# Patient Record
Sex: FEMALE | Race: WHITE | Employment: UNEMPLOYED | URBAN - METROPOLITAN AREA
[De-identification: names, ages, dates, MRNs, and addresses within clinical notes are randomized per-mention and may not be internally consistent; named-entity substitution may affect disease eponyms.]

---

## 2018-11-29 ENCOUNTER — TRANSFERRED RECORDS (OUTPATIENT)
Dept: HEALTH INFORMATION MANAGEMENT | Facility: CLINIC | Age: 58
End: 2018-11-29

## 2019-01-14 RX ORDER — ALBUTEROL SULFATE 90 UG/1
2 AEROSOL, METERED RESPIRATORY (INHALATION) EVERY 4 HOURS PRN
COMMUNITY

## 2019-01-14 RX ORDER — GABAPENTIN 300 MG/1
600 CAPSULE ORAL EVERY EVENING
COMMUNITY

## 2019-01-14 RX ORDER — LEVETIRACETAM 1000 MG/1
1000 TABLET ORAL EVERY EVENING
COMMUNITY

## 2019-01-14 RX ORDER — BUSPIRONE HYDROCHLORIDE 15 MG/1
15 TABLET ORAL 3 TIMES DAILY
Status: ON HOLD | COMMUNITY
End: 2019-09-12

## 2019-01-14 RX ORDER — HYDROCHLOROTHIAZIDE 25 MG/1
25 TABLET ORAL DAILY
Status: ON HOLD | COMMUNITY
End: 2019-09-15

## 2019-01-14 RX ORDER — LISINOPRIL 10 MG/1
10 TABLET ORAL DAILY
Status: ON HOLD | COMMUNITY
End: 2019-09-15

## 2019-01-14 SDOH — HEALTH STABILITY: MENTAL HEALTH: HOW OFTEN DO YOU HAVE A DRINK CONTAINING ALCOHOL?: NEVER

## 2019-01-14 ASSESSMENT — MIFFLIN-ST. JEOR: SCORE: 1537.29

## 2019-01-15 ASSESSMENT — MIFFLIN-ST. JEOR: SCORE: 1491.03

## 2019-01-16 ENCOUNTER — ANESTHESIA EVENT (OUTPATIENT)
Dept: SURGERY | Facility: CLINIC | Age: 59
DRG: 454 | End: 2019-01-16
Payer: COMMERCIAL

## 2019-01-17 ENCOUNTER — APPOINTMENT (OUTPATIENT)
Dept: GENERAL RADIOLOGY | Facility: CLINIC | Age: 59
DRG: 454 | End: 2019-01-17
Attending: ORTHOPAEDIC SURGERY
Payer: COMMERCIAL

## 2019-01-17 ENCOUNTER — HOSPITAL ENCOUNTER (INPATIENT)
Facility: CLINIC | Age: 59
LOS: 1 days | Discharge: HOME | DRG: 454 | End: 2019-01-18
Attending: ORTHOPAEDIC SURGERY | Admitting: ORTHOPAEDIC SURGERY
Payer: COMMERCIAL

## 2019-01-17 ENCOUNTER — ANESTHESIA (OUTPATIENT)
Dept: SURGERY | Facility: CLINIC | Age: 59
DRG: 454 | End: 2019-01-17
Payer: COMMERCIAL

## 2019-01-17 DIAGNOSIS — G89.18 POST-OP PAIN: Primary | ICD-10-CM

## 2019-01-17 PROBLEM — M48.061 LUMBAR STENOSIS: Status: ACTIVE | Noted: 2019-01-17

## 2019-01-17 LAB
ABO + RH BLD: NORMAL
ABO + RH BLD: NORMAL
BLD GP AB SCN SERPL QL: NORMAL
BLOOD BANK CMNT PATIENT-IMP: NORMAL
HGB BLD-MCNC: 13.6 G/DL (ref 11.7–15.7)
SPECIMEN EXP DATE BLD: NORMAL

## 2019-01-17 PROCEDURE — C1713 ANCHOR/SCREW BN/BN,TIS/BN: HCPCS | Performed by: ORTHOPAEDIC SURGERY

## 2019-01-17 PROCEDURE — 25000125 ZZHC RX 250: Performed by: ORTHOPAEDIC SURGERY

## 2019-01-17 PROCEDURE — 25000128 H RX IP 250 OP 636

## 2019-01-17 PROCEDURE — 25000128 H RX IP 250 OP 636: Performed by: ANESTHESIOLOGY

## 2019-01-17 PROCEDURE — 27211022 ZZHC OR IOM SUPPLIES OPNP: Performed by: ORTHOPAEDIC SURGERY

## 2019-01-17 PROCEDURE — 0SG00A0 FUSION OF LUMBAR VERTEBRAL JOINT WITH INTERBODY FUSION DEVICE, ANTERIOR APPROACH, ANTERIOR COLUMN, OPEN APPROACH: ICD-10-PCS | Performed by: ORTHOPAEDIC SURGERY

## 2019-01-17 PROCEDURE — 27210995 ZZH RX 272: Performed by: ORTHOPAEDIC SURGERY

## 2019-01-17 PROCEDURE — 25000132 ZZH RX MED GY IP 250 OP 250 PS 637: Performed by: ORTHOPAEDIC SURGERY

## 2019-01-17 PROCEDURE — 36000069 ZZH SURGERY LEVEL 5 EA 15 ADDTL MIN: Performed by: ORTHOPAEDIC SURGERY

## 2019-01-17 PROCEDURE — 36000071 ZZH SURGERY LEVEL 5 W FLUORO 1ST 30 MIN: Performed by: ORTHOPAEDIC SURGERY

## 2019-01-17 PROCEDURE — 0SW004Z REVISION OF INTERNAL FIXATION DEVICE IN LUMBAR VERTEBRAL JOINT, OPEN APPROACH: ICD-10-PCS | Performed by: ORTHOPAEDIC SURGERY

## 2019-01-17 PROCEDURE — 36415 COLL VENOUS BLD VENIPUNCTURE: CPT | Performed by: ORTHOPAEDIC SURGERY

## 2019-01-17 PROCEDURE — 25000128 H RX IP 250 OP 636: Performed by: ORTHOPAEDIC SURGERY

## 2019-01-17 PROCEDURE — 0SB20ZZ EXCISION OF LUMBAR VERTEBRAL DISC, OPEN APPROACH: ICD-10-PCS | Performed by: ORTHOPAEDIC SURGERY

## 2019-01-17 PROCEDURE — 86901 BLOOD TYPING SEROLOGIC RH(D): CPT | Performed by: ORTHOPAEDIC SURGERY

## 2019-01-17 PROCEDURE — 85018 HEMOGLOBIN: CPT | Performed by: ORTHOPAEDIC SURGERY

## 2019-01-17 PROCEDURE — 95940 IONM IN OPERATNG ROOM 15 MIN: CPT | Performed by: ORTHOPAEDIC SURGERY

## 2019-01-17 PROCEDURE — 37000009 ZZH ANESTHESIA TECHNICAL FEE, EACH ADDTL 15 MIN: Performed by: ORTHOPAEDIC SURGERY

## 2019-01-17 PROCEDURE — 71000012 ZZH RECOVERY PHASE 1 LEVEL 1 FIRST HR: Performed by: ORTHOPAEDIC SURGERY

## 2019-01-17 PROCEDURE — 12000000 ZZH R&B MED SURG/OB

## 2019-01-17 PROCEDURE — 86900 BLOOD TYPING SEROLOGIC ABO: CPT | Performed by: ORTHOPAEDIC SURGERY

## 2019-01-17 PROCEDURE — 40000306 ZZH STATISTIC PRE PROC ASSESS II: Performed by: ORTHOPAEDIC SURGERY

## 2019-01-17 PROCEDURE — 25000125 ZZHC RX 250: Performed by: NURSE ANESTHETIST, CERTIFIED REGISTERED

## 2019-01-17 PROCEDURE — 37000008 ZZH ANESTHESIA TECHNICAL FEE, 1ST 30 MIN: Performed by: ORTHOPAEDIC SURGERY

## 2019-01-17 PROCEDURE — 25000132 ZZH RX MED GY IP 250 OP 250 PS 637: Performed by: INTERNAL MEDICINE

## 2019-01-17 PROCEDURE — 07DS3ZZ EXTRACTION OF VERTEBRAL BONE MARROW, PERCUTANEOUS APPROACH: ICD-10-PCS | Performed by: ORTHOPAEDIC SURGERY

## 2019-01-17 PROCEDURE — 25000125 ZZHC RX 250

## 2019-01-17 PROCEDURE — 86850 RBC ANTIBODY SCREEN: CPT | Performed by: ORTHOPAEDIC SURGERY

## 2019-01-17 PROCEDURE — 25000128 H RX IP 250 OP 636: Performed by: NURSE ANESTHETIST, CERTIFIED REGISTERED

## 2019-01-17 PROCEDURE — 27210794 ZZH OR GENERAL SUPPLY STERILE: Performed by: ORTHOPAEDIC SURGERY

## 2019-01-17 PROCEDURE — 40000277 XR SURGERY CARM FLUORO LESS THAN 5 MIN W STILLS

## 2019-01-17 PROCEDURE — 71000013 ZZH RECOVERY PHASE 1 LEVEL 1 EA ADDTL HR: Performed by: ORTHOPAEDIC SURGERY

## 2019-01-17 PROCEDURE — 0SG0071 FUSION OF LUMBAR VERTEBRAL JOINT WITH AUTOLOGOUS TISSUE SUBSTITUTE, POSTERIOR APPROACH, POSTERIOR COLUMN, OPEN APPROACH: ICD-10-PCS | Performed by: ORTHOPAEDIC SURGERY

## 2019-01-17 PROCEDURE — C1763 CONN TISS, NON-HUMAN: HCPCS | Performed by: ORTHOPAEDIC SURGERY

## 2019-01-17 PROCEDURE — 27211024 ZZHC OR SUPPLY OTHER OPNP: Performed by: ORTHOPAEDIC SURGERY

## 2019-01-17 RX ORDER — SODIUM CHLORIDE, SODIUM LACTATE, POTASSIUM CHLORIDE, CALCIUM CHLORIDE 600; 310; 30; 20 MG/100ML; MG/100ML; MG/100ML; MG/100ML
INJECTION, SOLUTION INTRAVENOUS CONTINUOUS
Status: DISCONTINUED | OUTPATIENT
Start: 2019-01-17 | End: 2019-01-17 | Stop reason: HOSPADM

## 2019-01-17 RX ORDER — LORAZEPAM 2 MG/ML
.5-1 INJECTION INTRAMUSCULAR EVERY 6 HOURS PRN
Status: DISCONTINUED | OUTPATIENT
Start: 2019-01-17 | End: 2019-01-18 | Stop reason: HOSPADM

## 2019-01-17 RX ORDER — KETOROLAC TROMETHAMINE 30 MG/ML
30 INJECTION, SOLUTION INTRAMUSCULAR; INTRAVENOUS EVERY 6 HOURS
Status: DISCONTINUED | OUTPATIENT
Start: 2019-01-17 | End: 2019-01-18 | Stop reason: HOSPADM

## 2019-01-17 RX ORDER — NALOXONE HYDROCHLORIDE 0.4 MG/ML
.1-.4 INJECTION, SOLUTION INTRAMUSCULAR; INTRAVENOUS; SUBCUTANEOUS
Status: DISCONTINUED | OUTPATIENT
Start: 2019-01-17 | End: 2019-01-18 | Stop reason: HOSPADM

## 2019-01-17 RX ORDER — LIDOCAINE 40 MG/G
CREAM TOPICAL
Status: DISCONTINUED | OUTPATIENT
Start: 2019-01-17 | End: 2019-01-18 | Stop reason: HOSPADM

## 2019-01-17 RX ORDER — HYDRALAZINE HYDROCHLORIDE 20 MG/ML
2.5-5 INJECTION INTRAMUSCULAR; INTRAVENOUS EVERY 10 MIN PRN
Status: DISCONTINUED | OUTPATIENT
Start: 2019-01-17 | End: 2019-01-17 | Stop reason: HOSPADM

## 2019-01-17 RX ORDER — LABETALOL HYDROCHLORIDE 5 MG/ML
10 INJECTION, SOLUTION INTRAVENOUS
Status: DISCONTINUED | OUTPATIENT
Start: 2019-01-17 | End: 2019-01-17 | Stop reason: HOSPADM

## 2019-01-17 RX ORDER — LEVETIRACETAM 500 MG/1
1000 TABLET ORAL EVERY EVENING
Status: DISCONTINUED | OUTPATIENT
Start: 2019-01-17 | End: 2019-01-18 | Stop reason: HOSPADM

## 2019-01-17 RX ORDER — AMOXICILLIN 250 MG
1 CAPSULE ORAL 2 TIMES DAILY
Status: DISCONTINUED | OUTPATIENT
Start: 2019-01-17 | End: 2019-01-18 | Stop reason: HOSPADM

## 2019-01-17 RX ORDER — GLYCOPYRROLATE 0.2 MG/ML
INJECTION, SOLUTION INTRAMUSCULAR; INTRAVENOUS PRN
Status: DISCONTINUED | OUTPATIENT
Start: 2019-01-17 | End: 2019-01-17

## 2019-01-17 RX ORDER — LEVETIRACETAM 500 MG/1
500 TABLET ORAL EVERY MORNING
COMMUNITY

## 2019-01-17 RX ORDER — DIMENHYDRINATE 50 MG/ML
25 INJECTION, SOLUTION INTRAMUSCULAR; INTRAVENOUS
Status: DISCONTINUED | OUTPATIENT
Start: 2019-01-17 | End: 2019-01-17 | Stop reason: HOSPADM

## 2019-01-17 RX ORDER — DEXAMETHASONE SODIUM PHOSPHATE 4 MG/ML
INJECTION, SOLUTION INTRA-ARTICULAR; INTRALESIONAL; INTRAMUSCULAR; INTRAVENOUS; SOFT TISSUE PRN
Status: DISCONTINUED | OUTPATIENT
Start: 2019-01-17 | End: 2019-01-17

## 2019-01-17 RX ORDER — LORAZEPAM 0.5 MG/1
.5-1 TABLET ORAL EVERY 6 HOURS PRN
Status: DISCONTINUED | OUTPATIENT
Start: 2019-01-17 | End: 2019-01-18 | Stop reason: HOSPADM

## 2019-01-17 RX ORDER — VANCOMYCIN HCL 900 MCG/MG
POWDER (GRAM) MISCELLANEOUS PRN
Status: DISCONTINUED | OUTPATIENT
Start: 2019-01-17 | End: 2019-01-17 | Stop reason: HOSPADM

## 2019-01-17 RX ORDER — OXYCODONE HYDROCHLORIDE 5 MG/1
5-10 TABLET ORAL
Status: DISCONTINUED | OUTPATIENT
Start: 2019-01-17 | End: 2019-01-18 | Stop reason: HOSPADM

## 2019-01-17 RX ORDER — BUPIVACAINE HYDROCHLORIDE 2.5 MG/ML
INJECTION, SOLUTION EPIDURAL; INFILTRATION; INTRACAUDAL PRN
Status: DISCONTINUED | OUTPATIENT
Start: 2019-01-17 | End: 2019-01-17 | Stop reason: HOSPADM

## 2019-01-17 RX ORDER — CEFAZOLIN SODIUM 2 G/100ML
2 INJECTION, SOLUTION INTRAVENOUS
Status: COMPLETED | OUTPATIENT
Start: 2019-01-17 | End: 2019-01-17

## 2019-01-17 RX ORDER — HYDROMORPHONE HYDROCHLORIDE 1 MG/ML
.3-.5 INJECTION, SOLUTION INTRAMUSCULAR; INTRAVENOUS; SUBCUTANEOUS EVERY 5 MIN PRN
Status: DISCONTINUED | OUTPATIENT
Start: 2019-01-17 | End: 2019-01-17 | Stop reason: HOSPADM

## 2019-01-17 RX ORDER — LIDOCAINE HYDROCHLORIDE 10 MG/ML
INJECTION, SOLUTION INFILTRATION; PERINEURAL PRN
Status: DISCONTINUED | OUTPATIENT
Start: 2019-01-17 | End: 2019-01-17

## 2019-01-17 RX ORDER — CEFAZOLIN SODIUM 1 G/3ML
1 INJECTION, POWDER, FOR SOLUTION INTRAMUSCULAR; INTRAVENOUS SEE ADMIN INSTRUCTIONS
Status: DISCONTINUED | OUTPATIENT
Start: 2019-01-17 | End: 2019-01-17 | Stop reason: HOSPADM

## 2019-01-17 RX ORDER — CEFAZOLIN SODIUM 1 G/3ML
INJECTION, POWDER, FOR SOLUTION INTRAMUSCULAR; INTRAVENOUS PRN
Status: DISCONTINUED | OUTPATIENT
Start: 2019-01-17 | End: 2019-01-17

## 2019-01-17 RX ORDER — FENTANYL CITRATE 50 UG/ML
25-50 INJECTION, SOLUTION INTRAMUSCULAR; INTRAVENOUS
Status: DISCONTINUED | OUTPATIENT
Start: 2019-01-17 | End: 2019-01-17 | Stop reason: HOSPADM

## 2019-01-17 RX ORDER — ONDANSETRON 2 MG/ML
INJECTION INTRAMUSCULAR; INTRAVENOUS PRN
Status: DISCONTINUED | OUTPATIENT
Start: 2019-01-17 | End: 2019-01-17

## 2019-01-17 RX ORDER — LEVETIRACETAM 500 MG/1
500 TABLET ORAL EVERY MORNING
Status: DISCONTINUED | OUTPATIENT
Start: 2019-01-18 | End: 2019-01-18 | Stop reason: HOSPADM

## 2019-01-17 RX ORDER — ONDANSETRON 2 MG/ML
4 INJECTION INTRAMUSCULAR; INTRAVENOUS EVERY 30 MIN PRN
Status: DISCONTINUED | OUTPATIENT
Start: 2019-01-17 | End: 2019-01-17 | Stop reason: HOSPADM

## 2019-01-17 RX ORDER — AMOXICILLIN 250 MG
2 CAPSULE ORAL 2 TIMES DAILY
Status: DISCONTINUED | OUTPATIENT
Start: 2019-01-17 | End: 2019-01-18 | Stop reason: HOSPADM

## 2019-01-17 RX ORDER — CYCLOBENZAPRINE HCL 10 MG
10 TABLET ORAL 3 TIMES DAILY PRN
Status: DISCONTINUED | OUTPATIENT
Start: 2019-01-17 | End: 2019-01-18 | Stop reason: HOSPADM

## 2019-01-17 RX ORDER — PROPOFOL 10 MG/ML
INJECTION, EMULSION INTRAVENOUS CONTINUOUS PRN
Status: DISCONTINUED | OUTPATIENT
Start: 2019-01-17 | End: 2019-01-17

## 2019-01-17 RX ORDER — FENTANYL CITRATE 50 UG/ML
INJECTION, SOLUTION INTRAMUSCULAR; INTRAVENOUS PRN
Status: DISCONTINUED | OUTPATIENT
Start: 2019-01-17 | End: 2019-01-17

## 2019-01-17 RX ORDER — CEFAZOLIN SODIUM 1 G/50ML
1 INJECTION, SOLUTION INTRAVENOUS EVERY 8 HOURS
Status: COMPLETED | OUTPATIENT
Start: 2019-01-17 | End: 2019-01-18

## 2019-01-17 RX ORDER — EPHEDRINE SULFATE 50 MG/ML
INJECTION, SOLUTION INTRAMUSCULAR; INTRAVENOUS; SUBCUTANEOUS PRN
Status: DISCONTINUED | OUTPATIENT
Start: 2019-01-17 | End: 2019-01-17

## 2019-01-17 RX ORDER — ACETAMINOPHEN 325 MG/1
975 TABLET ORAL EVERY 8 HOURS
Status: DISCONTINUED | OUTPATIENT
Start: 2019-01-17 | End: 2019-01-18 | Stop reason: HOSPADM

## 2019-01-17 RX ORDER — ONDANSETRON 4 MG/1
4 TABLET, ORALLY DISINTEGRATING ORAL EVERY 30 MIN PRN
Status: DISCONTINUED | OUTPATIENT
Start: 2019-01-17 | End: 2019-01-17 | Stop reason: HOSPADM

## 2019-01-17 RX ORDER — LIDOCAINE 40 MG/G
CREAM TOPICAL
Status: DISCONTINUED | OUTPATIENT
Start: 2019-01-17 | End: 2019-01-17 | Stop reason: HOSPADM

## 2019-01-17 RX ORDER — METHADONE HYDROCHLORIDE 10 MG/ML
15 INJECTION, SOLUTION INTRAMUSCULAR; INTRAVENOUS; SUBCUTANEOUS ONCE
Status: COMPLETED | OUTPATIENT
Start: 2019-01-17 | End: 2019-01-17

## 2019-01-17 RX ORDER — GABAPENTIN 300 MG/1
300 CAPSULE ORAL 2 TIMES DAILY
Status: DISCONTINUED | OUTPATIENT
Start: 2019-01-17 | End: 2019-01-17

## 2019-01-17 RX ORDER — PROPOFOL 10 MG/ML
INJECTION, EMULSION INTRAVENOUS PRN
Status: DISCONTINUED | OUTPATIENT
Start: 2019-01-17 | End: 2019-01-17

## 2019-01-17 RX ORDER — SODIUM CHLORIDE AND POTASSIUM CHLORIDE 150; 900 MG/100ML; MG/100ML
INJECTION, SOLUTION INTRAVENOUS CONTINUOUS
Status: DISCONTINUED | OUTPATIENT
Start: 2019-01-17 | End: 2019-01-18 | Stop reason: CLARIF

## 2019-01-17 RX ORDER — NEOSTIGMINE METHYLSULFATE 1 MG/ML
VIAL (ML) INJECTION PRN
Status: DISCONTINUED | OUTPATIENT
Start: 2019-01-17 | End: 2019-01-17

## 2019-01-17 RX ORDER — ACETAMINOPHEN 325 MG/1
650 TABLET ORAL EVERY 4 HOURS PRN
Status: DISCONTINUED | OUTPATIENT
Start: 2019-01-20 | End: 2019-01-18 | Stop reason: HOSPADM

## 2019-01-17 RX ORDER — GABAPENTIN 300 MG/1
600 CAPSULE ORAL 2 TIMES DAILY
Status: DISCONTINUED | OUTPATIENT
Start: 2019-01-17 | End: 2019-01-18 | Stop reason: HOSPADM

## 2019-01-17 RX ADMIN — DEXMEDETOMIDINE HYDROCHLORIDE 0.4 MCG/KG/HR: 100 INJECTION, SOLUTION INTRAVENOUS at 08:06

## 2019-01-17 RX ADMIN — CEFAZOLIN SODIUM 2 G: 2 INJECTION, SOLUTION INTRAVENOUS at 07:50

## 2019-01-17 RX ADMIN — FENTANYL CITRATE 100 MCG: 50 INJECTION, SOLUTION INTRAMUSCULAR; INTRAVENOUS at 07:49

## 2019-01-17 RX ADMIN — Medication 0.5 MG: at 12:53

## 2019-01-17 RX ADMIN — FENTANYL CITRATE 50 MCG: 50 INJECTION, SOLUTION INTRAMUSCULAR; INTRAVENOUS at 12:51

## 2019-01-17 RX ADMIN — HYDROMORPHONE HYDROCHLORIDE 1 MG: 1 INJECTION, SOLUTION INTRAMUSCULAR; INTRAVENOUS; SUBCUTANEOUS at 08:03

## 2019-01-17 RX ADMIN — PROPOFOL 25 MCG/KG/MIN: 10 INJECTION, EMULSION INTRAVENOUS at 08:06

## 2019-01-17 RX ADMIN — Medication 20 MG: at 08:10

## 2019-01-17 RX ADMIN — PROPOFOL 200 MG: 10 INJECTION, EMULSION INTRAVENOUS at 07:49

## 2019-01-17 RX ADMIN — Medication 5 MG: at 09:51

## 2019-01-17 RX ADMIN — ROCURONIUM BROMIDE 25 MG: 10 INJECTION INTRAVENOUS at 09:29

## 2019-01-17 RX ADMIN — ROCURONIUM BROMIDE 10 MG: 10 INJECTION INTRAVENOUS at 10:11

## 2019-01-17 RX ADMIN — Medication 5 MG: at 10:15

## 2019-01-17 RX ADMIN — Medication 0.5 MG: at 14:11

## 2019-01-17 RX ADMIN — GLYCOPYRROLATE 0.2 MG: 0.2 INJECTION, SOLUTION INTRAMUSCULAR; INTRAVENOUS at 07:49

## 2019-01-17 RX ADMIN — LEVETIRACETAM 1000 MG: 500 TABLET, FILM COATED ORAL at 22:27

## 2019-01-17 RX ADMIN — DEXAMETHASONE SODIUM PHOSPHATE 4 MG: 4 INJECTION, SOLUTION INTRA-ARTICULAR; INTRALESIONAL; INTRAMUSCULAR; INTRAVENOUS; SOFT TISSUE at 07:49

## 2019-01-17 RX ADMIN — CEFAZOLIN 1 G: 1 INJECTION, POWDER, FOR SOLUTION INTRAMUSCULAR; INTRAVENOUS at 09:50

## 2019-01-17 RX ADMIN — CEFAZOLIN 1 G: 1 INJECTION, POWDER, FOR SOLUTION INTRAMUSCULAR; INTRAVENOUS at 11:50

## 2019-01-17 RX ADMIN — ROCURONIUM BROMIDE 10 MG: 10 INJECTION INTRAVENOUS at 10:40

## 2019-01-17 RX ADMIN — GABAPENTIN 600 MG: 300 CAPSULE ORAL at 22:27

## 2019-01-17 RX ADMIN — HYDROMORPHONE HYDROCHLORIDE 0.5 MG: 1 INJECTION, SOLUTION INTRAMUSCULAR; INTRAVENOUS; SUBCUTANEOUS at 08:13

## 2019-01-17 RX ADMIN — POTASSIUM CHLORIDE AND SODIUM CHLORIDE: 900; 150 INJECTION, SOLUTION INTRAVENOUS at 17:22

## 2019-01-17 RX ADMIN — SENNOSIDES AND DOCUSATE SODIUM 1 TABLET: 8.6; 5 TABLET ORAL at 22:28

## 2019-01-17 RX ADMIN — SODIUM CHLORIDE, POTASSIUM CHLORIDE, SODIUM LACTATE AND CALCIUM CHLORIDE: 600; 310; 30; 20 INJECTION, SOLUTION INTRAVENOUS at 07:00

## 2019-01-17 RX ADMIN — ROCURONIUM BROMIDE 5 MG: 10 INJECTION INTRAVENOUS at 10:02

## 2019-01-17 RX ADMIN — Medication 5 MG: at 10:12

## 2019-01-17 RX ADMIN — Medication 5 MG: at 10:08

## 2019-01-17 RX ADMIN — ROCURONIUM BROMIDE 20 MG: 10 INJECTION INTRAVENOUS at 07:49

## 2019-01-17 RX ADMIN — ACETAMINOPHEN 975 MG: 325 TABLET, FILM COATED ORAL at 17:23

## 2019-01-17 RX ADMIN — ONDANSETRON 4 MG: 2 INJECTION INTRAMUSCULAR; INTRAVENOUS at 11:55

## 2019-01-17 RX ADMIN — KETOROLAC TROMETHAMINE 30 MG: 30 INJECTION, SOLUTION INTRAMUSCULAR at 18:27

## 2019-01-17 RX ADMIN — Medication 5 MG: at 10:05

## 2019-01-17 RX ADMIN — Medication: at 13:57

## 2019-01-17 RX ADMIN — METHADONE HYDROCHLORIDE 15 MG: 10 INJECTION, SOLUTION INTRAMUSCULAR; INTRAVENOUS; SUBCUTANEOUS at 08:53

## 2019-01-17 RX ADMIN — SODIUM CHLORIDE, POTASSIUM CHLORIDE, SODIUM LACTATE AND CALCIUM CHLORIDE: 600; 310; 30; 20 INJECTION, SOLUTION INTRAVENOUS at 10:18

## 2019-01-17 RX ADMIN — Medication 1000 MG: at 08:08

## 2019-01-17 RX ADMIN — MIDAZOLAM 2 MG: 1 INJECTION INTRAMUSCULAR; INTRAVENOUS at 07:47

## 2019-01-17 RX ADMIN — Medication 2.5 MG: at 12:05

## 2019-01-17 RX ADMIN — SODIUM CHLORIDE, POTASSIUM CHLORIDE, SODIUM LACTATE AND CALCIUM CHLORIDE: 600; 310; 30; 20 INJECTION, SOLUTION INTRAVENOUS at 13:02

## 2019-01-17 RX ADMIN — Medication 5 MG: at 10:00

## 2019-01-17 RX ADMIN — ROCURONIUM BROMIDE 5 MG: 10 INJECTION INTRAVENOUS at 11:37

## 2019-01-17 RX ADMIN — GLYCOPYRROLATE 0.5 MG: 0.2 INJECTION, SOLUTION INTRAMUSCULAR; INTRAVENOUS at 12:05

## 2019-01-17 RX ADMIN — CEFAZOLIN SODIUM 1 G: 1 INJECTION, SOLUTION INTRAVENOUS at 18:27

## 2019-01-17 RX ADMIN — SODIUM CHLORIDE, POTASSIUM CHLORIDE, SODIUM LACTATE AND CALCIUM CHLORIDE: 600; 310; 30; 20 INJECTION, SOLUTION INTRAVENOUS at 08:32

## 2019-01-17 RX ADMIN — ONDANSETRON 4 MG: 2 INJECTION INTRAMUSCULAR; INTRAVENOUS at 07:49

## 2019-01-17 RX ADMIN — BUSPIRONE HYDROCHLORIDE 15 MG: 5 TABLET ORAL at 22:27

## 2019-01-17 RX ADMIN — LIDOCAINE HYDROCHLORIDE 50 MG: 10 INJECTION, SOLUTION INFILTRATION; PERINEURAL at 07:49

## 2019-01-17 RX ADMIN — HYDROMORPHONE HYDROCHLORIDE 0.5 MG: 1 INJECTION, SOLUTION INTRAMUSCULAR; INTRAVENOUS; SUBCUTANEOUS at 08:14

## 2019-01-17 ASSESSMENT — ENCOUNTER SYMPTOMS
STRIDOR: 0
DYSRHYTHMIAS: 0
SEIZURES: 1

## 2019-01-17 ASSESSMENT — MIFFLIN-ST. JEOR: SCORE: 1521.42

## 2019-01-17 ASSESSMENT — COPD QUESTIONNAIRES: COPD: 0

## 2019-01-17 ASSESSMENT — LIFESTYLE VARIABLES: TOBACCO_USE: 1

## 2019-01-17 ASSESSMENT — ACTIVITIES OF DAILY LIVING (ADL)
ADLS_ACUITY_SCORE: 16
ADLS_ACUITY_SCORE: 14

## 2019-01-17 NOTE — OR NURSING
Dr. Love explanted hardware from the patient during the procedure. From the left side of the spine he explanted 1 screw, 1 maine and 3 cap screws.  From the right side of the spine he explanted 1 screw, 1 cap screw and a piece of a maine.

## 2019-01-17 NOTE — ANESTHESIA POSTPROCEDURE EVALUATION
Patient: Josselin Cunha    Procedure(s):  L3-4 Oblique lumbar interbody fusion and posteriolaeral fusion and revision instrucmentation L3-L5    Diagnosis:adjescent level disease  Diagnosis Additional Information: L3 L4 stenosis       Anesthesia Type:  General, ETT    Note:  Anesthesia Post Evaluation    Patient location during evaluation: PACU  Patient participation: Able to fully participate in evaluation  Level of consciousness: sleepy but conscious  Pain management: adequate  Airway patency: patent  Cardiovascular status: acceptable  Respiratory status: acceptable  Hydration status: acceptable  PONV: controlled     Anesthetic complications: None          Last vitals:  Vitals:    01/17/19 1320 01/17/19 1325 01/17/19 1330   BP: 116/80 (!) 121/102    Pulse: 79 92    Resp: 12 9 13   Temp:      SpO2: 99% 98% 99%         Electronically Signed By: Guilherme Carcamo MD  January 17, 2019  1:33 PM

## 2019-01-17 NOTE — PROGRESS NOTES
"SPIRITUAL HEALTH SERVICES Progress Note  [facility] Pre-surgical    SH consult per pt's request for chaplaincy support prior to procedure today.   Met with pt, Josselin, and her spouse, Mj.   Josselin asks to defer  visit until \"after surgery, maybe tomorrow.\"   Will notify unit  of pt's request for consult on 1/18/19.   Oriented pt/family how to request  support sooner if her needs change.  SH remains available per pt/family/staff need or request.     ELIU Napier.  Staff    Pager #[phone]     "

## 2019-01-17 NOTE — ANESTHESIA CARE TRANSFER NOTE
Patient: Josselin Cunha    Procedure(s):  L3-4 Oblique lumbar interbody fusion and posteriolaeral fusion and revision instrucmentation L3-L5    Diagnosis: adjescent level disease  Diagnosis Additional Information: No value filed.    Anesthesia Type:   General, ETT     Note:  Airway :Face Mask  Patient transferred to:PACU  Handoff Report: Identifed the Patient, Identified the Reponsible Provider, Reviewed the pertinent medical history, Discussed the surgical course, Reviewed Intra-OP anesthesia mangement and issues during anesthesia, Set expectations for post-procedure period and Allowed opportunity for questions and acknowledgement of understanding      Vitals: (Last set prior to Anesthesia Care Transfer)    CRNA VITALS  1/17/2019 1156 - 1/17/2019 1232      1/17/2019             Pulse:  74    SpO2:  100 %    Resp Rate (observed):  8                Electronically Signed By: ALEJO Dale CRNA  January 17, 2019  12:32 PM

## 2019-01-17 NOTE — BRIEF OP NOTE
[facility] Brief Operative Note    Pre-operative diagnosis: adjescent level disease   Post-operative diagnosis L3 L4 stenosis        Procedure: Procedure(s):  L3-4 Oblique lumbar interbody fusion and posteriolaeral fusion and revision instrucmentation L3-L5   Surgeon(s): Surgeon(s) and Role:     * Christian Love MD - Primary     * Josselin Ryan PA-C - Assisting   Estimated blood loss: 300 mL    Specimens: * No specimens in log *   Findings: See op note

## 2019-01-17 NOTE — OP NOTE
Operative Date: 1-  PRE-PROCEDURE DIAGNOSIS:  1) L3 L4 degenerative disc disease and stenosis above prior fusion L4 to S1 with severe neurogenic claudication.  2)Massive  Obesety BMI 39  POST-PROCEDURE DIAGNOSIS:  1) Same as above  PROCEDURE PERFORMED:  1) L3 L4  oblique lateral lumbar interbody fusion with discectomy, preparation of the endplate and placement of a bullet cage packed with calcium triphosphate soaked in bone marrow anterior to the transverse process in modified prone position, with intraoperative biplanar fluoroscopic imaging and electrophysiological monitoring.  2) L3 Posterior minimally invasive pedicle screw placement and posterolateral instrumentation L3 to L4  and  Posterior fusion with LNK system with intraoperative biplanar fluoroscopic imaging and electrophysiological monitoring.  3) cut off old maine below L4 and remove old screws at L4 bilateral and replacement with new LNK screws  4) Transpedicular Bone marrow aspiration  5) Injection of 0.75 marcaine   in paravertebral tissue for post op pain management  Surgeon: Christian Love MD    ASSISTANT: IGOR Cordero  1st assistant needed for suctioning wound retraction positioning and wound closure.     HISTORY: Please refer to my clinic note for full details, but in short, patient is a 58 -year-old female with severe back pain and radiculopathy not responding to usual conservative therapy. Patient was set up for the surgery as mentioned above and was taken to surgery as mentioned above after all risks and benefits were explained.  PROCEDURE:  The patient was taken to surgery. After general anesthesia was applied, SCDs and Dimas placed and preoperative antibiotic given, then patient has been positioned on the Gilberto table and Timothy frame in a modified prone position for ease of access from the left side.  AP and lateral fluoroscopic images are positioned. Patient has been prepped and draped in sterile fashion. The landmarks, including  Spinal process, transverse process, disk space, endplates and pedicels are identified and marked.     A Jamshidi needle is placed in upper right pedicle inside of the vertebral body and bone marrow has been aspirated to be mixed with biologics to introduce Stem cells to the biologics.  Following steps are then taken for levels:     L3/4  Cage size 11 mm high and 30 mm long Titanium    The patient was turned using the rotation of the surgical table so that a near direct anterior-lateral approach to the lumbar spine could be achieved. A smal  incision was then made superior to the mid iliac crest and then using biplanar fluoroscopic visualization, under electrophysiological monitoring and stimulation, we introduced an electrophysiological probe through the retro peritoneal space into the desired discs anterior to the transverse processes and then passed it into the disc space after finding a silent window. The sleeve is retained and the probe is removed, then a K wire was passed sequentially into the disk space. A dilating tube was then passed along this same route. Following this, a working channel was then passed sequentially into the disc spaces. The working channel was manually held in position while a series of disc cleaning tools was passed through the channel to remove the affected discs under clear and direct biplanar fluoroscopic visualization, decompress the nerve roots, and decorticate the vertebral endplates at those segments.     Arthrodesis of the intervertebral spaces via an anterior retroperitoneal exposure and application of an intervertebral biomechanical device was then accomplished by using the working channel that had been placed in the retroperitoneal space anterior to the transverse processes. After adequate decompression and preparation of the endplates, we then put calcium triphosphate soaked in bone marrow anterior into disc space and then a PEEK interbody was packed tightly with allograft  "bone for stabilization and arthrodesis of the intervertebral spaces and inserted into the mid portion of the intervertebral discs. This was done under biplanar fluoroscopic visualization. All bone was confined to the borders of the disc space. The working channel was then removed.     Following steps are then taken for levels:  L3  7.5mm Screw size Right 50 Left 50      Then patient is rotated for a true prone position. Then entry point for the pedicles is identified in the AP and lateral view, and then skin incision has been injected with local anesthetic. Then we entered the pedicle with a Jamshidi needle. Over the Jamshidi needle, we introduced the K wire in Vertebral body. Additionally we use a small periostal elevator along the screws to refresh the surface of the bone and facet and put minimal amount of Calcium-triphoisphate soaked in bone marrow for additional posterolateral fusion. Over the K wire then , we dilate the muscle with the dilator and then put a pedicle screws bilaterally. Screws are all silent up to  25 MA of stimulation.    After the L3 perc screws were placed bilateral Radha incisions were made paralaterally.   Paraspinal muscles were split and prior instrumentation from L4 to S1 were bilaterally exposed.   High speed martina was used to cut the old titanium rods below the old L4 screws.   Then the old L4 screws were removed using \"helicopter\" technique and replaced with brand new 7.5mm by 50mm LNK medium top screws. Bilateral L3 L4 facet joint decortication and packing the joints with bone marrow soaked CTPhosphate was carried out constituting bilateral posterior facet fusions.   Both L3 and L4 screws were new and almost bicortical and had excellent purchase in the pedicles.    New rods were engaged bilaterally between these new L4 screws and previously inserted L3 perc screws.    Final ap and lateral C arm images showed excellent position of the cage and L3 and L4 screws.      Wound was " irrigated and one gram of vanco powder was spread between the two Radha incisions. The facial split incisions were closed with running 2 O vicryl sutures.   Skin was stapled and sterile dressing was applied.      Patient was turned back into supine position and extubated and taken to recovery room in good condition.

## 2019-01-17 NOTE — ANESTHESIA PREPROCEDURE EVALUATION
Anesthesia Pre-Procedure Evaluation    Patient: Josselin Cunha   MRN: [id number] : 1960          Preoperative Diagnosis: adjescent level disease    Procedure(s):  L3-4 Oblique lumbar interbody fusion and posteriolaeral fusion and revision instrucmentation l3-l5    Past Medical History:   Diagnosis Date     Acute Crohn's disease (H)      Complication of anesthesia     needs nebs before surgery     Hypertension      Other chronic pain     back     Seizures (H)     last seizure three years ago     Sleep apnea     not tested but thinks she has it     Uncomplicated asthma      Past Surgical History:   Procedure Laterality Date     ABDOMEN SURGERY      4 c-sections     BACK SURGERY      4 back surgeries     GI SURGERY  2014    bowel resection for chrons     ORTHOPEDIC SURGERY Bilateral     knee replacement     Anesthesia Evaluation     . Pt has had prior anesthetic. Type: General    No history of anesthetic complications          ROS/MED HX    ENT/Pulmonary:     (+)sleep apnea (she thinks she has OSAS), ULISES risk factors hypertension, obese, tobacco use, Past use Intermittent asthma doesn't use CPAP , . .   (-) COPD and recent URI   Neurologic:     (+)seizures    (-) CVA   Cardiovascular:     (+) hypertension----. : . . . :. .      (-) CAD, arrhythmias and valvular problems/murmurs   METS/Exercise Tolerance:     Hematologic:  - neg hematologic  ROS       Musculoskeletal:  - neg musculoskeletal ROS       GI/Hepatic:     (+) Inflammatory bowel disease,      (-) GERD   Renal/Genitourinary:  - ROS Renal section negative       Endo:     (+) Obesity, .   (-) Type I DM, Type II DM, thyroid disease and chronic steroid usage   Psychiatric:     (+) psychiatric history anxiety and depression (PTSD)      Infectious Disease:  - neg infectious disease ROS       Malignancy:      - no malignancy   Other:    (+) H/O Chronic Pain,                        Physical Exam  Normal systems: cardiovascular, pulmonary and dental    Airway  "  Mallampati: I  TM distance: >3 FB  Neck ROM: full    Dental     Cardiovascular   Rhythm and rate: regular and normal  (-) no friction rub, no systolic click and no murmur    Pulmonary    breath sounds clear to auscultation(-) no rhonchi, no decreased breath sounds, no wheezes, no rales and no stridor            Lab Results   Component Value Date    HGB 13.6 01/17/2019       Preop Vitals  BP Readings from Last 3 Encounters:   01/17/19 (!) 132/106    Pulse Readings from Last 3 Encounters:   01/17/19 91      Resp Readings from Last 3 Encounters:   01/17/19 20    SpO2 Readings from Last 3 Encounters:   01/17/19 96%      Temp Readings from Last 1 Encounters:   01/17/19 97.7  F (36.5  C) (Temporal)    Ht Readings from Last 1 Encounters:   01/17/19 1.588 m (5' 2.5\")      Wt Readings from Last 1 Encounters:   01/17/19 97.5 kg (215 lb)    Estimated body mass index is 38.7 kg/m  as calculated from the following:    Height as of this encounter: 1.588 m (5' 2.5\").    Weight as of this encounter: 97.5 kg (215 lb).       Anesthesia Plan      History & Physical Review  History and physical reviewed and following examination; no interval change.    ASA Status:  3 .    NPO Status:  > 8 hours    Plan for General and ETT with Intravenous induction. Maintenance will be Balanced.    PONV prophylaxis:  Ondansetron (or other 5HT-3) and Dexamethasone or Solumedrol       Postoperative Care  Postoperative pain management:  IV analgesics.      Consents  Anesthetic plan, risks, benefits and alternatives discussed with:  Patient or representative, Patient and Spouse.  Use of blood products discussed: Yes.   Use of blood products discussed with Spouse and Patient.  .                 Guilherme Carcamo MD                    .  "

## 2019-01-18 ENCOUNTER — APPOINTMENT (OUTPATIENT)
Dept: PHYSICAL THERAPY | Facility: CLINIC | Age: 59
DRG: 454 | End: 2019-01-18
Attending: ORTHOPAEDIC SURGERY
Payer: COMMERCIAL

## 2019-01-18 VITALS
DIASTOLIC BLOOD PRESSURE: 68 MMHG | HEART RATE: 85 BPM | OXYGEN SATURATION: 96 % | WEIGHT: 215 LBS | RESPIRATION RATE: 16 BRPM | HEIGHT: 63 IN | TEMPERATURE: 98.6 F | SYSTOLIC BLOOD PRESSURE: 109 MMHG | BODY MASS INDEX: 38.09 KG/M2

## 2019-01-18 PROCEDURE — 25000132 ZZH RX MED GY IP 250 OP 250 PS 637: Performed by: ORTHOPAEDIC SURGERY

## 2019-01-18 PROCEDURE — 97116 GAIT TRAINING THERAPY: CPT | Mod: GP | Performed by: PHYSICAL THERAPIST

## 2019-01-18 PROCEDURE — 40000193 ZZH STATISTIC PT WARD VISIT: Performed by: PHYSICAL THERAPIST

## 2019-01-18 PROCEDURE — 97530 THERAPEUTIC ACTIVITIES: CPT | Mod: GP | Performed by: PHYSICAL THERAPIST

## 2019-01-18 PROCEDURE — 25000132 ZZH RX MED GY IP 250 OP 250 PS 637: Performed by: INTERNAL MEDICINE

## 2019-01-18 PROCEDURE — 25000128 H RX IP 250 OP 636: Performed by: ORTHOPAEDIC SURGERY

## 2019-01-18 PROCEDURE — 97161 PT EVAL LOW COMPLEX 20 MIN: CPT | Mod: GP | Performed by: PHYSICAL THERAPIST

## 2019-01-18 RX ORDER — OXYCODONE AND ACETAMINOPHEN 10; 325 MG/1; MG/1
1 TABLET ORAL EVERY 4 HOURS PRN
Qty: 40 TABLET | Refills: 0 | Status: SHIPPED | OUTPATIENT
Start: 2019-01-18 | End: 2019-01-28

## 2019-01-18 RX ORDER — LORAZEPAM 0.5 MG/1
0.5 TABLET ORAL EVERY 6 HOURS PRN
Qty: 10 TABLET | Refills: 0 | Status: SHIPPED | OUTPATIENT
Start: 2019-01-18 | End: 2019-01-28

## 2019-01-18 RX ADMIN — OXYCODONE HYDROCHLORIDE 10 MG: 5 TABLET ORAL at 11:01

## 2019-01-18 RX ADMIN — BUSPIRONE HYDROCHLORIDE 15 MG: 5 TABLET ORAL at 07:35

## 2019-01-18 RX ADMIN — LORAZEPAM 0.5 MG: 0.5 TABLET ORAL at 12:35

## 2019-01-18 RX ADMIN — BUSPIRONE HYDROCHLORIDE 15 MG: 5 TABLET ORAL at 13:58

## 2019-01-18 RX ADMIN — KETOROLAC TROMETHAMINE 30 MG: 30 INJECTION, SOLUTION INTRAMUSCULAR at 00:05

## 2019-01-18 RX ADMIN — STANDARDIZED SENNA CONCENTRATE AND DOCUSATE SODIUM 2 TABLET: 8.6; 5 TABLET, FILM COATED ORAL at 07:35

## 2019-01-18 RX ADMIN — ACETAMINOPHEN 975 MG: 325 TABLET, FILM COATED ORAL at 07:36

## 2019-01-18 RX ADMIN — GABAPENTIN 600 MG: 300 CAPSULE ORAL at 07:35

## 2019-01-18 RX ADMIN — OXYCODONE HYDROCHLORIDE 10 MG: 5 TABLET ORAL at 13:56

## 2019-01-18 RX ADMIN — KETOROLAC TROMETHAMINE 30 MG: 30 INJECTION, SOLUTION INTRAMUSCULAR at 06:05

## 2019-01-18 RX ADMIN — CEFAZOLIN SODIUM 1 G: 1 INJECTION, SOLUTION INTRAVENOUS at 02:11

## 2019-01-18 RX ADMIN — OXYCODONE HYDROCHLORIDE 10 MG: 5 TABLET ORAL at 07:36

## 2019-01-18 RX ADMIN — CYCLOBENZAPRINE HYDROCHLORIDE 10 MG: 10 TABLET, FILM COATED ORAL at 02:13

## 2019-01-18 RX ADMIN — ACETAMINOPHEN 975 MG: 325 TABLET, FILM COATED ORAL at 00:05

## 2019-01-18 RX ADMIN — LORAZEPAM 0.5 MG: 0.5 TABLET ORAL at 06:15

## 2019-01-18 RX ADMIN — LEVETIRACETAM 500 MG: 500 TABLET, FILM COATED ORAL at 07:35

## 2019-01-18 ASSESSMENT — ACTIVITIES OF DAILY LIVING (ADL)
ADLS_ACUITY_SCORE: 15

## 2019-01-18 NOTE — PLAN OF CARE
Up independently with brace.  Showered.  Dressings CDI.  Pain well controlled with Oxycodone and Ativan.  Discharge instructions reviewed with patient who verbalized understanding.

## 2019-01-18 NOTE — DISCHARGE SUMMARY
This patient was admitted for  Following surgery for following reasons:    Operative Date: 1-  PRE-PROCEDURE DIAGNOSIS:  1) L3 L4 degenerative disc disease and stenosis above prior fusion L4 to S1 with severe neurogenic claudication.  2)Massive  Obesety BMI 39  POST-PROCEDURE DIAGNOSIS:  1) Same as above  PROCEDURE PERFORMED:  1) L3 L4  oblique lateral lumbar interbody fusion with discectomy, preparation of the endplate and placement of a bullet cage packed with calcium triphosphate soaked in bone marrow anterior to the transverse process in modified prone position, with intraoperative biplanar fluoroscopic imaging and electrophysiological monitoring.  2) L3 Posterior minimally invasive pedicle screw placement and posterolateral instrumentation L3 to L4  and  Posterior fusion with LNK system with intraoperative biplanar fluoroscopic imaging and electrophysiological monitoring.  3) cut off old maine below L4 and remove old screws at L4 bilateral and replacement with new LNK screws  4) Transpedicular Bone marrow aspiration  5) Injection of 0.75 marcaine   in paravertebral tissue for post op pain management  Surgeon: Christian Love MD     ASSISTANT: IGOR Cordero  1st assistant needed for suctioning wound retraction positioning and wound closure.      HISTORY: Please refer to my clinic note for full details, but in short, patient is a 58 -year-old female with severe back pain and radiculopathy not responding to usual conservative therapy. Patient was set up for the surgery as mentioned above and was taken to surgery as mentioned above after all risks and benefits were explained.  PROCEDURE:  The patient was taken to surgery. After general anesthesia was applied, SCDs and Dimas placed and preoperative antibiotic given, then patient has been positioned on the Gilberto table and Timothy frame in a modified prone position for ease of access from the left side.  AP and lateral fluoroscopic images are positioned. Patient  has been prepped and draped in sterile fashion. The landmarks, including Spinal process, transverse process, disk space, endplates and pedicels are identified and marked.     A Jamshidi needle is placed in upper right pedicle inside of the vertebral body and bone marrow has been aspirated to be mixed with biologics to introduce Stem cells to the biologics.  Following steps are then taken for levels:      L3/4  Cage size 11 mm high and 30 mm long Titanium     The patient was turned using the rotation of the surgical table so that a near direct anterior-lateral approach to the lumbar spine could be achieved. A smal  incision was then made superior to the mid iliac crest and then using biplanar fluoroscopic visualization, under electrophysiological monitoring and stimulation, we introduced an electrophysiological probe through the retro peritoneal space into the desired discs anterior to the transverse processes and then passed it into the disc space after finding a silent window. The sleeve is retained and the probe is removed, then a K wire was passed sequentially into the disk space. A dilating tube was then passed along this same route. Following this, a working channel was then passed sequentially into the disc spaces. The working channel was manually held in position while a series of disc cleaning tools was passed through the channel to remove the affected discs under clear and direct biplanar fluoroscopic visualization, decompress the nerve roots, and decorticate the vertebral endplates at those segments.     Arthrodesis of the intervertebral spaces via an anterior retroperitoneal exposure and application of an intervertebral biomechanical device was then accomplished by using the working channel that had been placed in the retroperitoneal space anterior to the transverse processes. After adequate decompression and preparation of the endplates, we then put calcium triphosphate soaked in bone marrow anterior  "into disc space and then a PEEK interbody was packed tightly with allograft bone for stabilization and arthrodesis of the intervertebral spaces and inserted into the mid portion of the intervertebral discs. This was done under biplanar fluoroscopic visualization. All bone was confined to the borders of the disc space. The working channel was then removed.     Following steps are then taken for levels:  L3  7.5mm Screw size Right 50 Left 50      Then patient is rotated for a true prone position. Then entry point for the pedicles is identified in the AP and lateral view, and then skin incision has been injected with local anesthetic. Then we entered the pedicle with a Jamshidi needle. Over the Jamshidi needle, we introduced the K wire in Vertebral body. Additionally we use a small periostal elevator along the screws to refresh the surface of the bone and facet and put minimal amount of Calcium-triphoisphate soaked in bone marrow for additional posterolateral fusion. Over the K wire then , we dilate the muscle with the dilator and then put a pedicle screws bilaterally. Screws are all silent up to  25 MA of stimulation.     After the L3 perc screws were placed bilateral Radha incisions were made paralaterally.   Paraspinal muscles were split and prior instrumentation from L4 to S1 were bilaterally exposed.   High speed martina was used to cut the old titanium rods below the old L4 screws.   Then the old L4 screws were removed using \"helicopter\" technique and replaced with brand new 7.5mm by 50mm LNK medium top screws. Bilateral L3 L4 facet joint decortication and packing the joints with bone marrow soaked CTPhosphate was carried out constituting bilateral posterior facet fusions.   Both L3 and L4 screws were new and almost bicortical and had excellent purchase in the pedicles.    New rods were engaged bilaterally between these new L4 screws and previously inserted L3 perc screws.     Final ap and lateral C arm images " showed excellent position of the cage and L3 and L4 screws.       Wound was irrigated and one gram of vanco powder was spread between the two Radha incisions. The facial split incisions were closed with running 2 O vicryl sutures.   Skin was stapled and sterile dressing was applied.       Patient was turned back into supine position and extubated and taken to recovery room in good condition.              Hospital Course:    Postop patient did well.   Preop leg pain cleared up.  Ambulating well.  Neuro intact.   Was able to be discharged POD #1.  Detailed instruction was given.   Follow-up at [facility] for staple removal.

## 2019-01-18 NOTE — CONSULTS
No charge note:  I seen the patient ambulating with physical therapy.  Spoke with patient's RN in charge and stated no ongoing medical issues or complaints.  Stable hemodynamics.  Off IV PCA.  Tolerating oral diet with no reported nausea or vomiting.  Remain afebrile.  I was informed that patient will be leaving today.  Discharge plans and discharge order are in place.  I have resume all of her home regimen for her known seizure disorder, BuSpar.  Will defer final discharge orders, pain regimen and DVT prophylaxis with primary spine service.    Please do not hesitate to contact us for any questions or concerns.

## 2019-01-18 NOTE — PLAN OF CARE
PT: Orders received. PT evaluation completed and treatment initiated. Pt reports living in a home with 4 steps to enter and no stairs within. Pt identifies multiple family/friends that will be available to assist as needed after discharge. Pt has been ambulating with a cane. Pt owns a walker, raised toilet seat, and shower chair for use after discharge.     Discharge Planner PT   Patient plan for discharge: Home today  Current status: Pt educated in spinal precautions. Pt completes supine>sit with log roll technique. Pt completes sit<>stand with SBA with cues for safe technique. Pt able to progress to Pippa after cuing for technique. Pt able to lilian/doff LSO indep. Pt ambulates 200' with use of FWW and SBA progressing to [place]ippa with cues for safe technique. Pt completes up/down 4 stairs with single rail and cane  with SBA with cues for technique. Pt tolerates well, steady throughout. Pt in bathroom with nursing at end of session.   Barriers to return to prior living situation: None anticipated  Recommendations for discharge: Home   Rationale for recommendations: Pt has demonstrated the ability to complete all mobility safely. No further IPPT needs.        Entered by: Kalani Carrion 01/18/2019 9:45 AM       Physical Therapy Discharge Summary    Reason for therapy discharge:    All goals and outcomes met, no further needs identified.    Progress towards therapy goal(s). See goals on Care Plan in [facility] electronic health record for goal details.  Goals met    Therapy recommendation(s):    Continue home exercise program. Walking program.

## 2019-01-18 NOTE — PHARMACY-ADMISSION MEDICATION HISTORY
Med rec completed by pre-admitting RN, Marina Jackson, verified by pharmacy.    Changes made to PTA medication list:  Added: none  Deleted: none  Changed: The keppra order was splitted as 500 mg qam and 1000 mg qpm.      Prior to Admission medications    Medication Sig Last Dose Taking? Auth Provider   albuterol (PROAIR HFA/PROVENTIL HFA/VENTOLIN HFA) 108 (90 Base) MCG/ACT inhaler Inhale 2 puffs into the lungs every 4 hours as needed for shortness of breath / dyspnea or wheezing 1/17/2019 at Unknown time Yes Reported, Patient   busPIRone (BUSPAR) 15 MG tablet Take 15 mg by mouth 3 times daily 1/16/2019 at Unknown time Yes Reported, Patient   gabapentin (NEURONTIN) 300 MG capsule Take 300 mg by mouth 2 times daily Takes 2 capsules in the morning and 2 capsules in the evening 1/15/2019 Yes Reported, Patient   hydrochlorothiazide (HYDRODIURIL) 25 MG tablet Take 25 mg by mouth daily 1/16/2019 at Unknown time Yes Reported, Patient   levETIRAcetam (KEPPRA) 1000 MG tablet Take 1,000 mg by mouth 2 times daily 500 mg in the morning, 1000 mg in the evening 1/16/2019 at Unknown time Yes Reported, Patient   lisinopril (PRINIVIL/ZESTRIL) 10 MG tablet Take 10 mg by mouth daily 1/16/2019 at Unknown time Yes Reported, Patient   sertraline (ZOLOFT) 50 MG tablet Take 50 mg by mouth daily 1/16/2019 at Unknown time Yes Reported, Patient

## 2019-01-18 NOTE — PLAN OF CARE
A&O. LS clear.Using PCA for pain control. Received flexeril for back spasms. Dressing is intact with scant drainage.

## 2019-01-18 NOTE — PROGRESS NOTES
01/18/19 0929   Quick Adds   Type of Visit Initial PT Evaluation   Living Environment   Lives With spouse   Living Arrangements house   Home Accessibility stairs to enter home  (3 stairs with a rail)   Self-Care   Usual Activity Tolerance moderate   Current Activity Tolerance moderate   Equipment Currently Used at Home cane, straight   Activity/Exercise/Self-Care Comment Owns a walker, bath bench, raised toilet seat   Functional Level Prior   Ambulation 1-->assistive equipment   Transferring 1-->assistive equipment   Toileting 0-->independent   Bathing 0-->independent   Fall history within last six months yes   Number of times patient has fallen within last six months 4   Which of the above functional risks had a recent onset or change? ambulation;transferring;toileting;bathing;dressing   General Information   Onset of Illness/Injury or Date of Surgery - Date 01/17/19   Referring Physician Christian Love MD   Patient/Family Goals Statement Discharge home today   Pertinent History of Current Problem (include personal factors and/or comorbidities that impact the POC) Pt is POD1 L3-4 OLIF. See chart for PMH.    Precautions/Limitations fall precautions;spinal precautions   Weight-Bearing Status - LLE full weight-bearing   Weight-Bearing Status - RLE full weight-bearing   General Observations Pt supine upon initiation, agreeable to session.    Cognitive Status Examination   Orientation orientation to person, place and time   Level of Consciousness alert   Follows Commands and Answers Questions 100% of the time   Personal Safety and Judgment intact   Memory intact   Pain Assessment   Patient Currently in Pain Yes, see Vital Sign flowsheet   Integumentary/Edema   Integumentary/Edema Comments Dressing intact.    Posture    Posture Forward head position;Protracted shoulders   Range of Motion (ROM)   ROM Comment Lumbar ROM limited 2/2 precautions, all other ROM WNL   Strength   Strength Comments able to SLR B LE   Bed  "Mobility   Bed Mobility Comments sit<>supine with SBA   Transfer Skills   Transfer Comments sit<>Stand SBA   Gait   Gait Comments SBA with FWW   Balance   Balance Comments requires B UE support for safe dynamic mobility   Sensory Examination   Sensory Perception no deficits were identified   Coordination   Coordination no deficits were identified   Muscle Tone   Muscle Tone no deficits were identified   Modality Interventions   Planned Modality Interventions Cryotherapy   Planned Modality Interventions Comments Ice PRN   General Therapy Interventions   Planned Therapy Interventions bed mobility training;gait training;ROM;strengthening;stretching;transfer training;home program guidelines;progressive activity/exercise   Clinical Impression   Criteria for Skilled Therapeutic Intervention yes, treatment indicated   PT Diagnosis Impaired functional mobility   Influenced by the following impairments Pain, weakness, spinal precautions   Functional limitations due to impairments Difficulty with bed mobility, transfers, ambulation, stairs   Clinical Presentation Stable/Uncomplicated   Clinical Presentation Rationale medically stable   Clinical Decision Making (Complexity) Low complexity   Therapy Frequency` 2 times/day   Predicted Duration of Therapy Intervention (days/wks) 3 days   Anticipated Discharge Disposition Home with Assist   Risk & Benefits of therapy have been explained Yes   Patient, Family & other staff in agreement with plan of care Yes   [facility] [employer] \"6 Clicks\"   2016, Trustees of [facility], under license to [employer].  All rights reserved.   6 Clicks Short Forms Basic Mobility Inpatient Short Form   [facility] [employer]  \"6 Clicks\" V.2 Basic Mobility Inpatient Short Form   1. Turning from your back to your side while in a flat bed without using bedrails? 4 - None   2. Moving from lying on your back to sitting on the side of a flat bed without using bedrails? 3 - A Little   3. " Moving to and from a bed to a chair (including a wheelchair)? 3 - A Little   4. Standing up from a chair using your arms (e.g., wheelchair, or bedside chair)? 3 - A Little   5. To walk in hospital room? 3 - A Little   6. Climbing 3-5 steps with a railing? 3 - A Little   Basic Mobility Raw Score (Score out of 24.Lower scores equate to lower levels of function) 19   Total Evaluation Time   Total Evaluation Time (Minutes) 8

## 2019-01-18 NOTE — PLAN OF CARE
A/O.  CMS intact.  Dressings intact to back with small amount drainage noted.  Pt very restless earlier, tearful at times.   Much encouragement and reassurance given. Encouraged use of PCA as needed as well as ice and aromatherapy.  Pain decreased to 4 which is tolerable for pt.  Prefers to lay on her left side.  Ambulated in hallway with Ax2, brace and walker.  Tolerated full liquid diet.  Dimas patent.  Pt planning on discharging home tomorrow with . Will continue to monitor.

## 2019-08-23 RX ORDER — HYDROCODONE BITARTRATE AND ACETAMINOPHEN 10; 325 MG/1; MG/1
1 TABLET ORAL EVERY 4 HOURS PRN
Status: ON HOLD | COMMUNITY
End: 2019-09-15

## 2019-09-10 ASSESSMENT — MIFFLIN-ST. JEOR: SCORE: 1442.72

## 2019-09-10 NOTE — PHARMACY-ADMISSION MEDICATION HISTORY
Admission medication history interview status for this patient is complete. See [facility] admission navigator for allergy information, prior to admission medications and immunization status.     PTA med list completed by pre-admitting nurse,   Nurse Marina Estrada RN Fri Aug 23, 2019  3:40 PM       Prior to Admission medications    Medication Sig Last Dose Taking? Auth Provider   albuterol (PROAIR HFA/PROVENTIL HFA/VENTOLIN HFA) 108 (90 Base) MCG/ACT inhaler Inhale 2 puffs into the lungs every 4 hours as needed for shortness of breath / dyspnea or wheezing  Yes Reported, Patient   busPIRone (BUSPAR) 15 MG tablet Take 15 mg by mouth 3 times daily  Yes Reported, Patient   gabapentin (NEURONTIN) 300 MG capsule Take 600 mg by mouth 2 times daily Takes 2 capsules in the morning and 2 capsules in the evening   Yes Reported, Patient   hydrochlorothiazide (HYDRODIURIL) 25 MG tablet Take 25 mg by mouth daily  Yes Reported, Patient   HYDROcodone-acetaminophen (NORCO)  MG per tablet Take 1 tablet by mouth every 4 hours as needed for severe pain  Yes Reported, Patient   levETIRAcetam (KEPPRA) 1000 MG tablet Take 1,000 mg by mouth every evening   Yes Reported, Patient   levETIRAcetam (KEPPRA) 500 MG tablet Take 500 mg by mouth every morning  Yes Unknown, Entered By History   lisinopril (PRINIVIL/ZESTRIL) 10 MG tablet Take 10 mg by mouth daily  Yes Reported, Patient   sertraline (ZOLOFT) 50 MG tablet Take 50 mg by mouth daily  Yes Reported, Patient

## 2019-09-11 ENCOUNTER — ANESTHESIA EVENT (OUTPATIENT)
Dept: SURGERY | Facility: CLINIC | Age: 59
DRG: 460 | End: 2019-09-11
Payer: COMMERCIAL

## 2019-09-12 ENCOUNTER — ANESTHESIA (OUTPATIENT)
Dept: SURGERY | Facility: CLINIC | Age: 59
DRG: 460 | End: 2019-09-12
Payer: COMMERCIAL

## 2019-09-12 ENCOUNTER — APPOINTMENT (OUTPATIENT)
Dept: GENERAL RADIOLOGY | Facility: CLINIC | Age: 59
DRG: 460 | End: 2019-09-12
Attending: ORTHOPAEDIC SURGERY
Payer: COMMERCIAL

## 2019-09-12 ENCOUNTER — HOSPITAL ENCOUNTER (INPATIENT)
Facility: CLINIC | Age: 59
LOS: 3 days | Discharge: HOME HEALTH | DRG: 460 | End: 2019-09-15
Attending: ORTHOPAEDIC SURGERY | Admitting: ORTHOPAEDIC SURGERY
Payer: COMMERCIAL

## 2019-09-12 DIAGNOSIS — M48.05 SPINAL STENOSIS OF THORACOLUMBAR REGION: ICD-10-CM

## 2019-09-12 DIAGNOSIS — G89.18 POST-OP PAIN: Primary | ICD-10-CM

## 2019-09-12 DIAGNOSIS — Z98.1 S/P LUMBAR SPINAL FUSION: ICD-10-CM

## 2019-09-12 LAB
ABO + RH BLD: NORMAL
ABO + RH BLD: NORMAL
BLD GP AB SCN SERPL QL: NORMAL
BLOOD BANK CMNT PATIENT-IMP: NORMAL
HGB BLD-MCNC: 11.9 G/DL (ref 11.7–15.7)
SPECIMEN EXP DATE BLD: NORMAL

## 2019-09-12 PROCEDURE — 25800030 ZZH RX IP 258 OP 636: Performed by: NURSE ANESTHETIST, CERTIFIED REGISTERED

## 2019-09-12 PROCEDURE — 25000132 ZZH RX MED GY IP 250 OP 250 PS 637: Performed by: PHYSICIAN ASSISTANT

## 2019-09-12 PROCEDURE — 25000128 H RX IP 250 OP 636: Performed by: ORTHOPAEDIC SURGERY

## 2019-09-12 PROCEDURE — 0RG6071 FUSION OF THORACIC VERTEBRAL JOINT WITH AUTOLOGOUS TISSUE SUBSTITUTE, POSTERIOR APPROACH, POSTERIOR COLUMN, OPEN APPROACH: ICD-10-PCS | Performed by: ORTHOPAEDIC SURGERY

## 2019-09-12 PROCEDURE — 0QH304Z INSERTION OF INTERNAL FIXATION DEVICE INTO LEFT PELVIC BONE, OPEN APPROACH: ICD-10-PCS | Performed by: ORTHOPAEDIC SURGERY

## 2019-09-12 PROCEDURE — 25000125 ZZHC RX 250: Performed by: ORTHOPAEDIC SURGERY

## 2019-09-12 PROCEDURE — 25000128 H RX IP 250 OP 636: Performed by: NURSE ANESTHETIST, CERTIFIED REGISTERED

## 2019-09-12 PROCEDURE — L8699 PROSTHETIC IMPLANT NOS: HCPCS | Performed by: ORTHOPAEDIC SURGERY

## 2019-09-12 PROCEDURE — 37000009 ZZH ANESTHESIA TECHNICAL FEE, EACH ADDTL 15 MIN: Performed by: ORTHOPAEDIC SURGERY

## 2019-09-12 PROCEDURE — 25000125 ZZHC RX 250: Performed by: NURSE ANESTHETIST, CERTIFIED REGISTERED

## 2019-09-12 PROCEDURE — 25000128 H RX IP 250 OP 636: Performed by: PHYSICIAN ASSISTANT

## 2019-09-12 PROCEDURE — 40000305 ZZH STATISTIC PRE PROC ASSESS I: Performed by: ORTHOPAEDIC SURGERY

## 2019-09-12 PROCEDURE — 27210995 ZZH RX 272: Performed by: ORTHOPAEDIC SURGERY

## 2019-09-12 PROCEDURE — 95940 IONM IN OPERATNG ROOM 15 MIN: CPT | Performed by: ORTHOPAEDIC SURGERY

## 2019-09-12 PROCEDURE — 86901 BLOOD TYPING SEROLOGIC RH(D): CPT | Performed by: ORTHOPAEDIC SURGERY

## 2019-09-12 PROCEDURE — 0SP004Z REMOVAL OF INTERNAL FIXATION DEVICE FROM LUMBAR VERTEBRAL JOINT, OPEN APPROACH: ICD-10-PCS | Performed by: ORTHOPAEDIC SURGERY

## 2019-09-12 PROCEDURE — 0SG1071 FUSION OF 2 OR MORE LUMBAR VERTEBRAL JOINTS WITH AUTOLOGOUS TISSUE SUBSTITUTE, POSTERIOR APPROACH, POSTERIOR COLUMN, OPEN APPROACH: ICD-10-PCS | Performed by: ORTHOPAEDIC SURGERY

## 2019-09-12 PROCEDURE — 86900 BLOOD TYPING SEROLOGIC ABO: CPT | Performed by: ORTHOPAEDIC SURGERY

## 2019-09-12 PROCEDURE — 85018 HEMOGLOBIN: CPT | Performed by: ORTHOPAEDIC SURGERY

## 2019-09-12 PROCEDURE — 37000008 ZZH ANESTHESIA TECHNICAL FEE, 1ST 30 MIN: Performed by: ORTHOPAEDIC SURGERY

## 2019-09-12 PROCEDURE — 36000071 ZZH SURGERY LEVEL 5 W FLUORO 1ST 30 MIN: Performed by: ORTHOPAEDIC SURGERY

## 2019-09-12 PROCEDURE — 86850 RBC ANTIBODY SCREEN: CPT | Performed by: ORTHOPAEDIC SURGERY

## 2019-09-12 PROCEDURE — 27211024 ZZHC OR SUPPLY OTHER OPNP: Performed by: ORTHOPAEDIC SURGERY

## 2019-09-12 PROCEDURE — 12000000 ZZH R&B MED SURG/OB

## 2019-09-12 PROCEDURE — 25000125 ZZHC RX 250: Performed by: PHYSICIAN ASSISTANT

## 2019-09-12 PROCEDURE — C1713 ANCHOR/SCREW BN/BN,TIS/BN: HCPCS | Performed by: ORTHOPAEDIC SURGERY

## 2019-09-12 PROCEDURE — 25000128 H RX IP 250 OP 636: Performed by: ANESTHESIOLOGY

## 2019-09-12 PROCEDURE — 8E0WXBZ COMPUTER ASSISTED PROCEDURE OF TRUNK REGION: ICD-10-PCS | Performed by: ORTHOPAEDIC SURGERY

## 2019-09-12 PROCEDURE — 0RGA071 FUSION OF THORACOLUMBAR VERTEBRAL JOINT WITH AUTOLOGOUS TISSUE SUBSTITUTE, POSTERIOR APPROACH, POSTERIOR COLUMN, OPEN APPROACH: ICD-10-PCS | Performed by: ORTHOPAEDIC SURGERY

## 2019-09-12 PROCEDURE — 71000013 ZZH RECOVERY PHASE 1 LEVEL 1 EA ADDTL HR: Performed by: ORTHOPAEDIC SURGERY

## 2019-09-12 PROCEDURE — 40000277 XR SURGERY CARM FLUORO LESS THAN 5 MIN W STILLS: Mod: TC

## 2019-09-12 PROCEDURE — C1762 CONN TISS, HUMAN(INC FASCIA): HCPCS | Performed by: ORTHOPAEDIC SURGERY

## 2019-09-12 PROCEDURE — 36415 COLL VENOUS BLD VENIPUNCTURE: CPT | Performed by: ORTHOPAEDIC SURGERY

## 2019-09-12 PROCEDURE — 25800030 ZZH RX IP 258 OP 636: Performed by: ANESTHESIOLOGY

## 2019-09-12 PROCEDURE — 25800025 ZZH RX 258: Performed by: ORTHOPAEDIC SURGERY

## 2019-09-12 PROCEDURE — 36000069 ZZH SURGERY LEVEL 5 EA 15 ADDTL MIN: Performed by: ORTHOPAEDIC SURGERY

## 2019-09-12 PROCEDURE — 0QH204Z INSERTION OF INTERNAL FIXATION DEVICE INTO RIGHT PELVIC BONE, OPEN APPROACH: ICD-10-PCS | Performed by: ORTHOPAEDIC SURGERY

## 2019-09-12 PROCEDURE — 25800030 ZZH RX IP 258 OP 636: Performed by: ORTHOPAEDIC SURGERY

## 2019-09-12 PROCEDURE — 27210794 ZZH OR GENERAL SUPPLY STERILE: Performed by: ORTHOPAEDIC SURGERY

## 2019-09-12 PROCEDURE — 4A11X4G MONITORING OF PERIPHERAL NERVOUS ELECTRICAL ACTIVITY, INTRAOPERATIVE, EXTERNAL APPROACH: ICD-10-PCS | Performed by: ORTHOPAEDIC SURGERY

## 2019-09-12 PROCEDURE — 71000012 ZZH RECOVERY PHASE 1 LEVEL 1 FIRST HR: Performed by: ORTHOPAEDIC SURGERY

## 2019-09-12 DEVICE — GRAFT BONE INFUSE BMP MED 7510400: Type: IMPLANTABLE DEVICE | Site: SPINE LUMBAR | Status: FUNCTIONAL

## 2019-09-12 DEVICE — GRAFT BONE PUTTY DBX 10ML 038100: Type: IMPLANTABLE DEVICE | Site: SPINE LUMBAR | Status: FUNCTIONAL

## 2019-09-12 RX ORDER — LIDOCAINE 40 MG/G
CREAM TOPICAL
Status: DISCONTINUED | OUTPATIENT
Start: 2019-09-12 | End: 2019-09-15 | Stop reason: HOSPADM

## 2019-09-12 RX ORDER — HYDROMORPHONE HYDROCHLORIDE 1 MG/ML
.5-1 INJECTION, SOLUTION INTRAMUSCULAR; INTRAVENOUS; SUBCUTANEOUS
Status: DISCONTINUED | OUTPATIENT
Start: 2019-09-12 | End: 2019-09-15 | Stop reason: HOSPADM

## 2019-09-12 RX ORDER — METHADONE HYDROCHLORIDE 10 MG/ML
20 INJECTION, SOLUTION INTRAMUSCULAR; INTRAVENOUS; SUBCUTANEOUS ONCE
Status: COMPLETED | OUTPATIENT
Start: 2019-09-12 | End: 2019-09-12

## 2019-09-12 RX ORDER — AMOXICILLIN 250 MG
2 CAPSULE ORAL 2 TIMES DAILY
Status: DISCONTINUED | OUTPATIENT
Start: 2019-09-12 | End: 2019-09-15 | Stop reason: HOSPADM

## 2019-09-12 RX ORDER — METHADONE HYDROCHLORIDE 10 MG/ML
INJECTION, SOLUTION INTRAMUSCULAR; INTRAVENOUS; SUBCUTANEOUS PRN
Status: DISCONTINUED | OUTPATIENT
Start: 2019-09-12 | End: 2019-09-12

## 2019-09-12 RX ORDER — NALOXONE HYDROCHLORIDE 0.4 MG/ML
.1-.4 INJECTION, SOLUTION INTRAMUSCULAR; INTRAVENOUS; SUBCUTANEOUS
Status: DISCONTINUED | OUTPATIENT
Start: 2019-09-12 | End: 2019-09-15 | Stop reason: HOSPADM

## 2019-09-12 RX ORDER — ONDANSETRON 2 MG/ML
INJECTION INTRAMUSCULAR; INTRAVENOUS PRN
Status: DISCONTINUED | OUTPATIENT
Start: 2019-09-12 | End: 2019-09-12

## 2019-09-12 RX ORDER — BUPIVACAINE HYDROCHLORIDE AND EPINEPHRINE 2.5; 5 MG/ML; UG/ML
INJECTION, SOLUTION EPIDURAL; INFILTRATION; INTRACAUDAL; PERINEURAL PRN
Status: DISCONTINUED | OUTPATIENT
Start: 2019-09-12 | End: 2019-09-12 | Stop reason: HOSPADM

## 2019-09-12 RX ORDER — ALBUTEROL SULFATE 0.83 MG/ML
2.5 SOLUTION RESPIRATORY (INHALATION) EVERY 4 HOURS PRN
Status: DISCONTINUED | OUTPATIENT
Start: 2019-09-12 | End: 2019-09-12 | Stop reason: HOSPADM

## 2019-09-12 RX ORDER — ONDANSETRON 4 MG/1
4 TABLET, ORALLY DISINTEGRATING ORAL EVERY 6 HOURS PRN
Status: DISCONTINUED | OUTPATIENT
Start: 2019-09-12 | End: 2019-09-15 | Stop reason: HOSPADM

## 2019-09-12 RX ORDER — ONDANSETRON 2 MG/ML
4 INJECTION INTRAMUSCULAR; INTRAVENOUS EVERY 30 MIN PRN
Status: DISCONTINUED | OUTPATIENT
Start: 2019-09-12 | End: 2019-09-12 | Stop reason: HOSPADM

## 2019-09-12 RX ORDER — FENTANYL CITRATE 50 UG/ML
25-50 INJECTION, SOLUTION INTRAMUSCULAR; INTRAVENOUS
Status: DISCONTINUED | OUTPATIENT
Start: 2019-09-12 | End: 2019-09-12 | Stop reason: HOSPADM

## 2019-09-12 RX ORDER — PHENYLEPHRINE HCL IN 0.9% NACL 1 MG/10 ML
SYRINGE (ML) INTRAVENOUS CONTINUOUS PRN
Status: DISCONTINUED | OUTPATIENT
Start: 2019-09-12 | End: 2019-09-12

## 2019-09-12 RX ORDER — LEVETIRACETAM 500 MG/1
500 TABLET ORAL EVERY MORNING
Status: DISCONTINUED | OUTPATIENT
Start: 2019-09-13 | End: 2019-09-15 | Stop reason: HOSPADM

## 2019-09-12 RX ORDER — LEVETIRACETAM 500 MG/1
1000 TABLET ORAL EVERY EVENING
Status: DISCONTINUED | OUTPATIENT
Start: 2019-09-12 | End: 2019-09-15 | Stop reason: HOSPADM

## 2019-09-12 RX ORDER — ACETAMINOPHEN 325 MG/1
650 TABLET ORAL EVERY 4 HOURS PRN
Status: DISCONTINUED | OUTPATIENT
Start: 2019-09-15 | End: 2019-09-15 | Stop reason: HOSPADM

## 2019-09-12 RX ORDER — SODIUM CHLORIDE, SODIUM LACTATE, POTASSIUM CHLORIDE, CALCIUM CHLORIDE 600; 310; 30; 20 MG/100ML; MG/100ML; MG/100ML; MG/100ML
INJECTION, SOLUTION INTRAVENOUS CONTINUOUS
Status: DISCONTINUED | OUTPATIENT
Start: 2019-09-12 | End: 2019-09-12 | Stop reason: HOSPADM

## 2019-09-12 RX ORDER — NEOSTIGMINE METHYLSULFATE 1 MG/ML
VIAL (ML) INJECTION PRN
Status: DISCONTINUED | OUTPATIENT
Start: 2019-09-12 | End: 2019-09-12

## 2019-09-12 RX ORDER — DIAZEPAM 10 MG/2ML
5 INJECTION, SOLUTION INTRAMUSCULAR; INTRAVENOUS ONCE
Status: COMPLETED | OUTPATIENT
Start: 2019-09-12 | End: 2019-09-12

## 2019-09-12 RX ORDER — FENTANYL CITRATE 50 UG/ML
INJECTION, SOLUTION INTRAMUSCULAR; INTRAVENOUS PRN
Status: DISCONTINUED | OUTPATIENT
Start: 2019-09-12 | End: 2019-09-12

## 2019-09-12 RX ORDER — LIDOCAINE 40 MG/G
CREAM TOPICAL
Status: DISCONTINUED | OUTPATIENT
Start: 2019-09-12 | End: 2019-09-12 | Stop reason: HOSPADM

## 2019-09-12 RX ORDER — OXYCODONE HYDROCHLORIDE 5 MG/1
5-10 TABLET ORAL
Status: DISCONTINUED | OUTPATIENT
Start: 2019-09-12 | End: 2019-09-13

## 2019-09-12 RX ORDER — DEXAMETHASONE SODIUM PHOSPHATE 10 MG/ML
10 INJECTION, SOLUTION INTRAMUSCULAR; INTRAVENOUS ONCE
Status: DISCONTINUED | OUTPATIENT
Start: 2019-09-12 | End: 2019-09-12 | Stop reason: HOSPADM

## 2019-09-12 RX ORDER — MEPERIDINE HYDROCHLORIDE 25 MG/ML
12.5 INJECTION INTRAMUSCULAR; INTRAVENOUS; SUBCUTANEOUS
Status: DISCONTINUED | OUTPATIENT
Start: 2019-09-12 | End: 2019-09-12 | Stop reason: HOSPADM

## 2019-09-12 RX ORDER — CITALOPRAM HYDROBROMIDE 20 MG/1
20 TABLET ORAL DAILY
Status: ON HOLD | COMMUNITY
End: 2019-09-15

## 2019-09-12 RX ORDER — ALBUTEROL SULFATE 90 UG/1
2 AEROSOL, METERED RESPIRATORY (INHALATION) EVERY 4 HOURS PRN
Status: DISCONTINUED | OUTPATIENT
Start: 2019-09-12 | End: 2019-09-15 | Stop reason: HOSPADM

## 2019-09-12 RX ORDER — ONDANSETRON 4 MG/1
4 TABLET, ORALLY DISINTEGRATING ORAL EVERY 30 MIN PRN
Status: DISCONTINUED | OUTPATIENT
Start: 2019-09-12 | End: 2019-09-12 | Stop reason: HOSPADM

## 2019-09-12 RX ORDER — ONDANSETRON 2 MG/ML
4 INJECTION INTRAMUSCULAR; INTRAVENOUS EVERY 6 HOURS PRN
Status: DISCONTINUED | OUTPATIENT
Start: 2019-09-12 | End: 2019-09-15 | Stop reason: HOSPADM

## 2019-09-12 RX ORDER — LISINOPRIL 10 MG/1
10 TABLET ORAL DAILY
Status: DISCONTINUED | OUTPATIENT
Start: 2019-09-13 | End: 2019-09-14

## 2019-09-12 RX ORDER — CEFAZOLIN SODIUM 1 G/3ML
1 INJECTION, POWDER, FOR SOLUTION INTRAMUSCULAR; INTRAVENOUS SEE ADMIN INSTRUCTIONS
Status: DISCONTINUED | OUTPATIENT
Start: 2019-09-12 | End: 2019-09-12 | Stop reason: HOSPADM

## 2019-09-12 RX ORDER — KETOROLAC TROMETHAMINE 30 MG/ML
30 INJECTION, SOLUTION INTRAMUSCULAR; INTRAVENOUS EVERY 6 HOURS
Status: COMPLETED | OUTPATIENT
Start: 2019-09-12 | End: 2019-09-13

## 2019-09-12 RX ORDER — CEFAZOLIN SODIUM 1 G/3ML
1 INJECTION, POWDER, FOR SOLUTION INTRAMUSCULAR; INTRAVENOUS EVERY 8 HOURS
Status: COMPLETED | OUTPATIENT
Start: 2019-09-12 | End: 2019-09-13

## 2019-09-12 RX ORDER — CYCLOBENZAPRINE HCL 10 MG
10 TABLET ORAL 3 TIMES DAILY PRN
Status: DISCONTINUED | OUTPATIENT
Start: 2019-09-12 | End: 2019-09-15 | Stop reason: HOSPADM

## 2019-09-12 RX ORDER — DEXAMETHASONE SODIUM PHOSPHATE 4 MG/ML
INJECTION, SOLUTION INTRA-ARTICULAR; INTRALESIONAL; INTRAMUSCULAR; INTRAVENOUS; SOFT TISSUE PRN
Status: DISCONTINUED | OUTPATIENT
Start: 2019-09-12 | End: 2019-09-12

## 2019-09-12 RX ORDER — ACETAMINOPHEN 325 MG/1
975 TABLET ORAL EVERY 8 HOURS
Status: COMPLETED | OUTPATIENT
Start: 2019-09-12 | End: 2019-09-15

## 2019-09-12 RX ORDER — AMOXICILLIN 250 MG
1 CAPSULE ORAL 2 TIMES DAILY
Status: DISCONTINUED | OUTPATIENT
Start: 2019-09-12 | End: 2019-09-15 | Stop reason: HOSPADM

## 2019-09-12 RX ORDER — CITALOPRAM HYDROBROMIDE 20 MG/1
20 TABLET ORAL DAILY
Status: DISCONTINUED | OUTPATIENT
Start: 2019-09-12 | End: 2019-09-13

## 2019-09-12 RX ORDER — GLYCOPYRROLATE 0.2 MG/ML
INJECTION, SOLUTION INTRAMUSCULAR; INTRAVENOUS PRN
Status: DISCONTINUED | OUTPATIENT
Start: 2019-09-12 | End: 2019-09-12

## 2019-09-12 RX ORDER — CYCLOBENZAPRINE HCL 10 MG
10 TABLET ORAL 3 TIMES DAILY PRN
COMMUNITY
End: 2020-07-22

## 2019-09-12 RX ORDER — VANCOMYCIN HYDROCHLORIDE 1 G/20ML
INJECTION, POWDER, LYOPHILIZED, FOR SOLUTION INTRAVENOUS PRN
Status: DISCONTINUED | OUTPATIENT
Start: 2019-09-12 | End: 2019-09-12 | Stop reason: HOSPADM

## 2019-09-12 RX ORDER — KETAMINE HYDROCHLORIDE 10 MG/ML
INJECTION INTRAMUSCULAR; INTRAVENOUS PRN
Status: DISCONTINUED | OUTPATIENT
Start: 2019-09-12 | End: 2019-09-12

## 2019-09-12 RX ORDER — EPHEDRINE SULFATE 50 MG/ML
INJECTION, SOLUTION INTRAMUSCULAR; INTRAVENOUS; SUBCUTANEOUS PRN
Status: DISCONTINUED | OUTPATIENT
Start: 2019-09-12 | End: 2019-09-12

## 2019-09-12 RX ORDER — LIDOCAINE HYDROCHLORIDE 10 MG/ML
INJECTION, SOLUTION INFILTRATION; PERINEURAL PRN
Status: DISCONTINUED | OUTPATIENT
Start: 2019-09-12 | End: 2019-09-12

## 2019-09-12 RX ORDER — DIMENHYDRINATE 50 MG/ML
25 INJECTION, SOLUTION INTRAMUSCULAR; INTRAVENOUS
Status: DISCONTINUED | OUTPATIENT
Start: 2019-09-12 | End: 2019-09-12 | Stop reason: HOSPADM

## 2019-09-12 RX ORDER — HYDROCHLOROTHIAZIDE 25 MG/1
25 TABLET ORAL DAILY
Status: DISCONTINUED | OUTPATIENT
Start: 2019-09-13 | End: 2019-09-14

## 2019-09-12 RX ORDER — SODIUM CHLORIDE AND POTASSIUM CHLORIDE 150; 900 MG/100ML; MG/100ML
INJECTION, SOLUTION INTRAVENOUS CONTINUOUS
Status: DISCONTINUED | OUTPATIENT
Start: 2019-09-12 | End: 2019-09-15 | Stop reason: HOSPADM

## 2019-09-12 RX ORDER — PROPOFOL 10 MG/ML
INJECTION, EMULSION INTRAVENOUS PRN
Status: DISCONTINUED | OUTPATIENT
Start: 2019-09-12 | End: 2019-09-12

## 2019-09-12 RX ORDER — CEFAZOLIN SODIUM 2 G/100ML
2 INJECTION, SOLUTION INTRAVENOUS
Status: COMPLETED | OUTPATIENT
Start: 2019-09-12 | End: 2019-09-12

## 2019-09-12 RX ORDER — PROPOFOL 10 MG/ML
INJECTION, EMULSION INTRAVENOUS CONTINUOUS PRN
Status: DISCONTINUED | OUTPATIENT
Start: 2019-09-12 | End: 2019-09-12

## 2019-09-12 RX ORDER — HYDROXYZINE HYDROCHLORIDE 25 MG/1
25 TABLET, FILM COATED ORAL EVERY 6 HOURS PRN
Status: DISCONTINUED | OUTPATIENT
Start: 2019-09-12 | End: 2019-09-15 | Stop reason: HOSPADM

## 2019-09-12 RX ORDER — NALOXONE HYDROCHLORIDE 0.4 MG/ML
.1-.4 INJECTION, SOLUTION INTRAMUSCULAR; INTRAVENOUS; SUBCUTANEOUS
Status: DISCONTINUED | OUTPATIENT
Start: 2019-09-12 | End: 2019-09-12 | Stop reason: HOSPADM

## 2019-09-12 RX ORDER — METOPROLOL TARTRATE 1 MG/ML
1-2 INJECTION, SOLUTION INTRAVENOUS EVERY 5 MIN PRN
Status: DISCONTINUED | OUTPATIENT
Start: 2019-09-12 | End: 2019-09-12 | Stop reason: HOSPADM

## 2019-09-12 RX ORDER — SUMATRIPTAN 100 MG/1
100 TABLET, FILM COATED ORAL
COMMUNITY

## 2019-09-12 RX ADMIN — CEFAZOLIN 1 G: 1 INJECTION, POWDER, FOR SOLUTION INTRAMUSCULAR; INTRAVENOUS at 12:18

## 2019-09-12 RX ADMIN — ROCURONIUM BROMIDE 3 MG: 10 INJECTION INTRAVENOUS at 08:13

## 2019-09-12 RX ADMIN — MIDAZOLAM 2 MG: 1 INJECTION INTRAMUSCULAR; INTRAVENOUS at 08:00

## 2019-09-12 RX ADMIN — SODIUM CHLORIDE, POTASSIUM CHLORIDE, SODIUM LACTATE AND CALCIUM CHLORIDE: 600; 310; 30; 20 INJECTION, SOLUTION INTRAVENOUS at 08:07

## 2019-09-12 RX ADMIN — CEFAZOLIN 1 G: 1 INJECTION, POWDER, FOR SOLUTION INTRAMUSCULAR; INTRAVENOUS at 10:20

## 2019-09-12 RX ADMIN — PHENYLEPHRINE HYDROCHLORIDE 50 MCG: 10 INJECTION INTRAVENOUS at 10:22

## 2019-09-12 RX ADMIN — LEVETIRACETAM 1000 MG: 500 TABLET, FILM COATED ORAL at 21:03

## 2019-09-12 RX ADMIN — PHENYLEPHRINE HYDROCHLORIDE 0.05 MCG/KG/MIN: 10 INJECTION INTRAVENOUS at 10:50

## 2019-09-12 RX ADMIN — GLYCOPYRROLATE 0.2 MG: 0.2 INJECTION, SOLUTION INTRAMUSCULAR; INTRAVENOUS at 08:11

## 2019-09-12 RX ADMIN — PROPOFOL 20 MG: 10 INJECTION, EMULSION INTRAVENOUS at 09:01

## 2019-09-12 RX ADMIN — ROCURONIUM BROMIDE 10 MG: 10 INJECTION INTRAVENOUS at 10:18

## 2019-09-12 RX ADMIN — ROCURONIUM BROMIDE 20 MG: 10 INJECTION INTRAVENOUS at 10:52

## 2019-09-12 RX ADMIN — ACETAMINOPHEN 975 MG: 325 TABLET, FILM COATED ORAL at 21:03

## 2019-09-12 RX ADMIN — FENTANYL CITRATE 150 MCG: 50 INJECTION, SOLUTION INTRAMUSCULAR; INTRAVENOUS at 08:12

## 2019-09-12 RX ADMIN — Medication 1 G: at 08:12

## 2019-09-12 RX ADMIN — PHENYLEPHRINE HYDROCHLORIDE 100 MCG: 10 INJECTION INTRAVENOUS at 10:32

## 2019-09-12 RX ADMIN — Medication 10 MG: at 08:52

## 2019-09-12 RX ADMIN — ROCURONIUM BROMIDE 15 MG: 10 INJECTION INTRAVENOUS at 09:32

## 2019-09-12 RX ADMIN — FENTANYL CITRATE 50 MCG: 50 INJECTION INTRAMUSCULAR; INTRAVENOUS at 15:56

## 2019-09-12 RX ADMIN — HYDROMORPHONE HYDROCHLORIDE 0.5 MG: 1 INJECTION, SOLUTION INTRAMUSCULAR; INTRAVENOUS; SUBCUTANEOUS at 19:43

## 2019-09-12 RX ADMIN — Medication 888.93 MG: at 08:41

## 2019-09-12 RX ADMIN — PHENYLEPHRINE HYDROCHLORIDE 200 MCG: 10 INJECTION INTRAVENOUS at 08:55

## 2019-09-12 RX ADMIN — CEFAZOLIN 1 G: 1 INJECTION, POWDER, FOR SOLUTION INTRAMUSCULAR; INTRAVENOUS at 14:20

## 2019-09-12 RX ADMIN — LIDOCAINE HYDROCHLORIDE 30 MG: 10 INJECTION, SOLUTION INFILTRATION; PERINEURAL at 08:13

## 2019-09-12 RX ADMIN — Medication 20 MG: at 08:22

## 2019-09-12 RX ADMIN — OXYCODONE HYDROCHLORIDE 5 MG: 5 TABLET ORAL at 21:03

## 2019-09-12 RX ADMIN — PROPOFOL 110 MG: 10 INJECTION, EMULSION INTRAVENOUS at 08:13

## 2019-09-12 RX ADMIN — Medication 100 MG: at 08:13

## 2019-09-12 RX ADMIN — ROCURONIUM BROMIDE 20 MG: 10 INJECTION INTRAVENOUS at 12:34

## 2019-09-12 RX ADMIN — ROCURONIUM BROMIDE 10 MG: 10 INJECTION INTRAVENOUS at 11:46

## 2019-09-12 RX ADMIN — SODIUM CHLORIDE, POTASSIUM CHLORIDE, SODIUM LACTATE AND CALCIUM CHLORIDE: 600; 310; 30; 20 INJECTION, SOLUTION INTRAVENOUS at 08:30

## 2019-09-12 RX ADMIN — FENTANYL CITRATE 50 MCG: 50 INJECTION INTRAMUSCULAR; INTRAVENOUS at 16:34

## 2019-09-12 RX ADMIN — HYDROMORPHONE HYDROCHLORIDE 0.2 MG: 1 INJECTION, SOLUTION INTRAMUSCULAR; INTRAVENOUS; SUBCUTANEOUS at 17:22

## 2019-09-12 RX ADMIN — Medication 1 ML: at 10:05

## 2019-09-12 RX ADMIN — HYDROMORPHONE HYDROCHLORIDE 0.5 MG: 1 INJECTION, SOLUTION INTRAMUSCULAR; INTRAVENOUS; SUBCUTANEOUS at 18:27

## 2019-09-12 RX ADMIN — SODIUM CHLORIDE, POTASSIUM CHLORIDE, SODIUM LACTATE AND CALCIUM CHLORIDE: 600; 310; 30; 20 INJECTION, SOLUTION INTRAVENOUS at 09:00

## 2019-09-12 RX ADMIN — PROPOFOL 50 MCG/KG/MIN: 10 INJECTION, EMULSION INTRAVENOUS at 09:12

## 2019-09-12 RX ADMIN — PHENYLEPHRINE HYDROCHLORIDE 100 MCG: 10 INJECTION INTRAVENOUS at 11:06

## 2019-09-12 RX ADMIN — SENNOSIDES AND DOCUSATE SODIUM 1 TABLET: 8.6; 5 TABLET ORAL at 21:03

## 2019-09-12 RX ADMIN — Medication 50 MG: at 08:14

## 2019-09-12 RX ADMIN — Medication 3 MG: at 14:57

## 2019-09-12 RX ADMIN — ROCURONIUM BROMIDE 17 MG: 10 INJECTION INTRAVENOUS at 09:03

## 2019-09-12 RX ADMIN — CEFAZOLIN SODIUM 2 G: 2 INJECTION, SOLUTION INTRAVENOUS at 08:22

## 2019-09-12 RX ADMIN — DEXAMETHASONE SODIUM PHOSPHATE 10 MG: 4 INJECTION, SOLUTION INTRA-ARTICULAR; INTRALESIONAL; INTRAMUSCULAR; INTRAVENOUS; SOFT TISSUE at 08:13

## 2019-09-12 RX ADMIN — FENTANYL CITRATE 100 MCG: 50 INJECTION, SOLUTION INTRAMUSCULAR; INTRAVENOUS at 08:11

## 2019-09-12 RX ADMIN — CITALOPRAM HYDROBROMIDE 20 MG: 20 TABLET ORAL at 21:03

## 2019-09-12 RX ADMIN — Medication 10 MG: at 08:28

## 2019-09-12 RX ADMIN — POTASSIUM CHLORIDE AND SODIUM CHLORIDE: 900; 150 INJECTION, SOLUTION INTRAVENOUS at 22:08

## 2019-09-12 RX ADMIN — SODIUM CHLORIDE, POTASSIUM CHLORIDE, SODIUM LACTATE AND CALCIUM CHLORIDE: 600; 310; 30; 20 INJECTION, SOLUTION INTRAVENOUS at 07:09

## 2019-09-12 RX ADMIN — GLYCOPYRROLATE 0.4 MG: 0.2 INJECTION, SOLUTION INTRAMUSCULAR; INTRAVENOUS at 14:57

## 2019-09-12 RX ADMIN — ONDANSETRON HYDROCHLORIDE 4 MG: 2 INJECTION, SOLUTION INTRAVENOUS at 15:19

## 2019-09-12 RX ADMIN — KETOROLAC TROMETHAMINE 30 MG: 30 INJECTION, SOLUTION INTRAMUSCULAR at 21:04

## 2019-09-12 RX ADMIN — CEFAZOLIN 1 G: 1 INJECTION, POWDER, FOR SOLUTION INTRAMUSCULAR; INTRAVENOUS at 22:08

## 2019-09-12 RX ADMIN — Medication 5 MG: at 09:55

## 2019-09-12 RX ADMIN — ROCURONIUM BROMIDE 20 MG: 10 INJECTION INTRAVENOUS at 13:25

## 2019-09-12 RX ADMIN — FAMOTIDINE 20 MG: 10 INJECTION, SOLUTION INTRAVENOUS at 21:04

## 2019-09-12 RX ADMIN — Medication 5 MG: at 17:56

## 2019-09-12 RX ADMIN — HYDROMORPHONE HYDROCHLORIDE 0.5 MG: 1 INJECTION, SOLUTION INTRAMUSCULAR; INTRAVENOUS; SUBCUTANEOUS at 22:08

## 2019-09-12 RX ADMIN — PHENYLEPHRINE HYDROCHLORIDE 50 MCG: 10 INJECTION INTRAVENOUS at 10:45

## 2019-09-12 ASSESSMENT — COPD QUESTIONNAIRES: COPD: 1

## 2019-09-12 ASSESSMENT — LIFESTYLE VARIABLES: TOBACCO_USE: 1

## 2019-09-12 ASSESSMENT — MIFFLIN-ST. JEOR: SCORE: 1438.18

## 2019-09-12 ASSESSMENT — ENCOUNTER SYMPTOMS: SEIZURES: 1

## 2019-09-12 ASSESSMENT — ACTIVITIES OF DAILY LIVING (ADL): ADLS_ACUITY_SCORE: 18

## 2019-09-12 NOTE — OR NURSING
During the procedure Dr. Love explanted the following hardware:    AMW spine hardware: 7.5mm x 50mm screws (x4), 50mm curved rods (x2), and set/cap screws (x4).    Other spine hardware: set screw (x1), maine (x1), complete screw (x1), partial/broken screws (x2). Two screws were broken during the removal, therefore only part of them was explanted. Parts of the two screws remain in the patient's vertebrae.

## 2019-09-12 NOTE — PROGRESS NOTES
"SPIRITUAL HEALTH SERVICES Progress Note  [facility] Pre-surgical    SH consult per pt's request for support prior to surgery.  Met with pt, Josselin, and she shares that \"it's been quite a year\" related to ongoing problems with her back and previous surgeries.   She expresses the \"best part of the year\" has been spending time with family, especially her \"10 grand children.\"   Josselin identifies as Religious and invites prayer; we shared in prayer together.   Oriented Josselin how to request SH support post surgery if desired.     ELIU Napier.  Staff    Pager #[phone]     "

## 2019-09-12 NOTE — ANESTHESIA CARE TRANSFER NOTE
Patient: Josselin Cunha    Procedure(s):  L1-L3 Transforaminal or Oblique Interbody Fusion, T11-L4 Posteriolateral Fusion and Revision Instrumentation, T11-S1 Infuse    Diagnosis: failed l3-l4, ddd adjacent level disease  Diagnosis Additional Information: No value filed.    Anesthesia Type:   General, ETT     Note:  Airway :Face Mask, Nasopharyngeal Airway and Oral Airway  Patient transferred to:PACU  Comments: Spont Resps. Follows commands. Extubated. Maintains Resps. Tx to pacu. Report to RNHandoff Report: Identifed the Patient, Identified the Reponsible Provider, Reviewed the pertinent medical history, Discussed the surgical course, Reviewed Intra-OP anesthesia mangement and issues during anesthesia, Set expectations for post-procedure period and Allowed opportunity for questions and acknowledgement of understanding      Vitals: (Last set prior to Anesthesia Care Transfer)    CRNA VITALS  9/12/2019 1450 - 9/12/2019 1526      9/12/2019             Pulse:  97    SpO2:  100 %    Resp Rate (observed):  10                Electronically Signed By: ALEJO Reis CRNA  September 12, 2019  3:26 PM

## 2019-09-12 NOTE — ANESTHESIA PREPROCEDURE EVALUATION
Anesthesia Pre-Procedure Evaluation    Patient: Josselin Cunha   MRN: [id number] : 1960          Preoperative Diagnosis: failed l3-l4, ddd adjacent level disease    Procedure(s):  T11-L4 Posterior Lumbar Instrumented Fusion, L1-L3 Transforaminal Lumbar Instrumented Fusion, T11-S1 Revision instrumentation with iliac screws infuse    Past Medical History:   Diagnosis Date     Acute Crohn's disease (H)      Complication of anesthesia     needs nebs before surgery     COPD (chronic obstructive pulmonary disease) (H)      Drug overdose      Hypertension      Noninfectious ileitis     Chrons     Other chronic pain     back     PTSD (post-traumatic stress disorder)      Seizures (H)     last seizure three years ago     Sleep apnea     not tested but thinks she has it     Uncomplicated asthma      Past Surgical History:   Procedure Laterality Date     ABDOMEN SURGERY      4 c-sections     BACK SURGERY      4 back surgeries     DECOMPRESSION, FUSION LUMBAR POSTERIOR TWO LEVELS, COMBINED N/A 2019    Procedure: L3 - L4  oblique lateral lumbar interbody fusion  L3 Posterior minimally invasive pedicle screw placement and posterolateral instrumentation L3 to L4  and  Posterior fusion  cut off old maine below L4 and remove old screws at L4 bilateral and replacement;  Surgeon: Christian Love MD;  Location: [facility] OR     GI SURGERY  2014    bowel resection for chrons     ORTHOPEDIC SURGERY Bilateral     knee replacement     Anesthesia Evaluation     .             ROS/MED HX    ENT/Pulmonary:     (+)sleep apnea, tobacco use, Past use asthma COPD, , . .    Neurologic:     (+)seizures     Cardiovascular:     (+) Dyslipidemia, hypertension----. : . . . :. .       METS/Exercise Tolerance:     Hematologic:  - neg hematologic  ROS       Musculoskeletal:   (+) arthritis,  -       GI/Hepatic:     (+) Other GI/Hepatic Crohns      Renal/Genitourinary:  - ROS Renal section negative       Endo:  - neg endo ROS       Psychiatric:     (+)  "psychiatric history anxiety and depression      Infectious Disease:  - neg infectious disease ROS       Malignancy:         Other: Comment: .Lab Test        09/12/19 01/17/19                       0656          0606          HGB          11.9         13.6           No lab results found.     (+) H/O Chronic Pain,                        Physical Exam  Normal systems: cardiovascular and pulmonary    Airway   Mallampati: II    Dental     Cardiovascular   Rhythm and rate: regular and normal      Pulmonary    breath sounds clear to auscultation            Lab Results   Component Value Date    HGB 11.9 09/12/2019       Preop Vitals  BP Readings from Last 3 Encounters:   09/12/19 114/78   01/18/19 109/68    Pulse Readings from Last 3 Encounters:   09/12/19 68   01/17/19 85      Resp Readings from Last 3 Encounters:   09/12/19 18   01/18/19 16    SpO2 Readings from Last 3 Encounters:   09/12/19 98%   01/18/19 96%      Temp Readings from Last 1 Encounters:   09/12/19 97.7  F (36.5  C) (Temporal)    Ht Readings from Last 1 Encounters:   09/12/19 1.6 m (5' 3\")      Wt Readings from Last 1 Encounters:   09/12/19 88.9 kg (196 lb)    Estimated body mass index is 34.72 kg/m  as calculated from the following:    Height as of this encounter: 1.6 m (5' 3\").    Weight as of this encounter: 88.9 kg (196 lb).       Anesthesia Plan      History & Physical Review  History and physical reviewed and following examination; no interval change.    ASA Status:  3 .        Plan for General and ETT with Intravenous induction. Maintenance will be Inhalation and Balanced.    PONV prophylaxis:  Ondansetron (or other 5HT-3) and Dexamethasone or Solumedrol       Postoperative Care  Postoperative pain management:  IV analgesics and Oral pain medications.      Consents  Anesthetic plan, risks, benefits and alternatives discussed with:  Patient or representative..                 Ollie Rooney DO                    .  "

## 2019-09-12 NOTE — BRIEF OP NOTE
[facility] Brief Operative Note    Pre-operative diagnosis: failed l3-l4, ddd adjacent level disease   Post-operative diagnosis failed fusion L3 L4 and DJD and kyphosis thoracolumbar junction     Procedure: Procedure(s):  L1-L3 Transforaminal or Oblique Interbody Fusion, T11-L4 Posteriolateral Fusion and Revision Instrumentation, T11-S1 Infuse   Surgeon(s): Surgeon(s) and Role:     * Christian Love MD - Primary     * Josselin Ryan PA-C - Assisting   Estimated blood loss: 800 mL    Specimens: * No specimens in log *   Findings: See op note

## 2019-09-12 NOTE — OP NOTE
Procedure Date: 09/12/2019      PREOPERATIVE DIAGNOSIS:  Failed fusion L3-L4, stenosis L1-L2, L2-L3 thoracolumbar kyphosis.      POSTOPERATIVE DIAGNOSIS:  Failed fusion L3-L4, stenosis L1-L2, L2-L3 thoracolumbar kyphosis.      SURGEON:  Christian Love MD.      FIRST ASSISTANT:  Yuri Dunlap PA-C.  First assistant was necessary for tissue retraction, patient positioning, wound closure and wound suction.      PROCEDURES PERFORMED:   1.  Removal of prior instrumentation, L3, L4, L5.   2.  Exploration of spinal fusion.   3.  Repeat posterolateral fusion at L3-L4.   4.  Posterior fusion, T11-T12, L1, L2, L3.   5.  Segmental instrumentation T11-12, L2, L2, L3 L4, L5 and sacrum using iliac screws.   6.  Infuse medium dose kit supplemented with 10 mL of DBX, DBM and local morselized bone graft.      OPERATING TIME:  6 hours.      ESTIMATED BLOOD LOSS:  800 mL.      COMPLICATIONS:  None.      Intraoperative SSEP monitoring used throughout the case.  Signals remained stable throughout the case.      INDICATIONS:  The patient is a 59-year-old woman with a prior fusion at the L3-L4.  Unfortunately, this resulted in failed fusion with severe incapacitating back and leg pain.  She also had some stenosis at the level above at L2-3 and L1-2 and also had a thoracolumbar disk degeneration with some kyphosis with pain centered at that area as well.  Given this situation, aforementioned procedure was recommended with fusion from T11 through L4.  The nature of surgery, risks, and alternatives were discussed.  The patient opted to proceed.      DESCRIPTION OF PROCEDURE:  The patient was brought to the operating theater.  General endotracheal anesthesia was induced in an uneventful manner.  A timeout was called.  Prophylactic antibiotic was given, SCDs applied.  The patient was hooked up for intraoperative SSEP monitoring.  The hips were extended to prevent flat back syndrome.  Four poster Gilberto OSI frame was used.  After routine prep  and draped using DuraPrep, midline longitudinal incision was then made approximately from T10 through sacrum.  Skin was incised.  Subcutaneous tissue was incised.  Paraspinal muscles were subperiosteally elevated and previously inserted instrumentation was noted.  L3-L4 screws were removed.  L3 pedicle screw was especially loose, corresponding to preoperative CT imaging study, corresponding to the patient's severe right-sided leg pain preoperatively.  Right L5 pedicle screw was also removed and this was noted that there was a fracture in the mid portion of the pedicle screws.  Therefore, decision was made to go down to the sacrum and bilateral iliac screws inserted.  These were S2 iliac screws going through the lateral wall of the dorsal S1 foramen and using fluoroscopic guidance, gearshift pedicle finder was inserted in appropriate fashion using pelvic inlet/outlet views and the final screws used were 8.5 mm diameter screws, 80 mm in length and this traversed the SI joint and provided excellent fixation of the sacrum and ilium.  The L3-L4 screws were replaced with 1 mm larger diameter and 5 mm longer screws and I had excellent purchase with new L3-L4 pedicle screws.  The L5 pedicle screw was skipped.  Then, pedicle screws inserted into the pedicles of T11-T12, L1-L2 using intermittent intraoperative C-arm guidance, mostly AP images.  After insertion of the screws at T11, T12, L1, L2, L3, L4, and iliac screws bilaterally, images were taken and the images showed excellent positioning of the pedicle screws and iliac screws.  The thoracic pedicle screws were 6.5 mm in diameter, 50 mm in length and the lumbar screws were 6.5 mm in diameter, 55-60 mm in length.  The L3-L4 pedicle screws were 8.5 mm in diameter, replacing previously inserted 7.5 mm pedicle screws at both levels.  There was gross motion across the site of prior fusion at L3-L4 consistent with the patient's preoperative severe incapacitating symptoms with  right lower extremity radiation.      Initially transforaminal interbody fusion was to be done; however, this was skipped as most obvious pathology was the failed fusion at the L3-L4 at the previous site of fusion and the thoracolumbar kyphosis.  After the instrumentation was applied, decortication using Capener gouge was used to decorticate the facets and lamina from T11 through L4 throughout over the decorticated areas.  Infuse medium dose kit supplemented with 10 mL of DBX DBM supplemented with morcellized local autologous bone graft.  It was packed as constituting posterior fusion from T11 through L4.  I felt the laminectomy was not needed at the L2-3, L3-4 as patient's symptoms referable to the nonunion at L3-L4 and preoperative MRI and CT showed a very mild severe stenosis at this level, stabilizing those levels.  Should she have a good outcome, laminectomy would also further take away from the surface area available for bone grafting.  Titanium rods were contoured and cut into appropriate shape and length and were engaged into the reduction type of pedicle screws from T11 through L4 throughout.  Nuts were tightened down using counter torque wrench.  Good secure fixation was achieved.  The wound was irrigated with antibiotic saturated solution, 2 grams of vancomycin powder sprayed into the wound prior to the closure.  Deep fascial tissue was reapproximated using #1 Vicryl suture.  Subcutaneous tissue was reapproximated using 2-0 Vicryl sutures.  One Hemovac line was brought out from subcutaneous tissue and skin was reapproximated using skin staples.  Aquacel dressing was applied.  The patient was turned into supine position.  The patient was extubated and patient was taken to the recovery room in good condition.  The patient tolerated the procedure well.         LUDY PERKINS MD             D: 09/12/2019   T: 09/12/2019   MT: DIEGO      Name:     FERNANDO PEREZ   MRN:      [id number]        Account:         [id number]   :      1960           Procedure Date: 2019      Document: [id number]

## 2019-09-13 ENCOUNTER — APPOINTMENT (OUTPATIENT)
Dept: PHYSICAL THERAPY | Facility: CLINIC | Age: 59
DRG: 460 | End: 2019-09-13
Attending: ORTHOPAEDIC SURGERY
Payer: COMMERCIAL

## 2019-09-13 LAB
ANION GAP SERPL CALCULATED.3IONS-SCNC: 2 MMOL/L (ref 3–14)
BUN SERPL-MCNC: 18 MG/DL (ref 7–30)
CALCIUM SERPL-MCNC: 8 MG/DL (ref 8.5–10.1)
CHLORIDE SERPL-SCNC: 107 MMOL/L (ref 94–109)
CO2 SERPL-SCNC: 30 MMOL/L (ref 20–32)
CREAT SERPL-MCNC: 0.54 MG/DL (ref 0.52–1.04)
GFR SERPL CREATININE-BSD FRML MDRD: >90 ML/MIN/{1.73_M2}
GLUCOSE SERPL-MCNC: 122 MG/DL (ref 70–99)
HGB BLD-MCNC: 9.2 G/DL (ref 11.7–15.7)
POTASSIUM SERPL-SCNC: 4.4 MMOL/L (ref 3.4–5.3)
SODIUM SERPL-SCNC: 140 MMOL/L (ref 133–144)

## 2019-09-13 PROCEDURE — 80048 BASIC METABOLIC PNL TOTAL CA: CPT | Performed by: PHYSICIAN ASSISTANT

## 2019-09-13 PROCEDURE — 97530 THERAPEUTIC ACTIVITIES: CPT | Mod: GP | Performed by: PHYSICAL THERAPIST

## 2019-09-13 PROCEDURE — 97116 GAIT TRAINING THERAPY: CPT | Mod: GP | Performed by: PHYSICAL THERAPIST

## 2019-09-13 PROCEDURE — 97161 PT EVAL LOW COMPLEX 20 MIN: CPT | Mod: GP | Performed by: PHYSICAL THERAPIST

## 2019-09-13 PROCEDURE — 99222 1ST HOSP IP/OBS MODERATE 55: CPT | Performed by: NURSE PRACTITIONER

## 2019-09-13 PROCEDURE — 12000000 ZZH R&B MED SURG/OB

## 2019-09-13 PROCEDURE — 85018 HEMOGLOBIN: CPT | Performed by: PHYSICIAN ASSISTANT

## 2019-09-13 PROCEDURE — 25000132 ZZH RX MED GY IP 250 OP 250 PS 637: Performed by: NURSE PRACTITIONER

## 2019-09-13 PROCEDURE — 25000132 ZZH RX MED GY IP 250 OP 250 PS 637: Performed by: PHYSICIAN ASSISTANT

## 2019-09-13 PROCEDURE — 36415 COLL VENOUS BLD VENIPUNCTURE: CPT | Performed by: PHYSICIAN ASSISTANT

## 2019-09-13 PROCEDURE — 25000128 H RX IP 250 OP 636: Performed by: PHYSICIAN ASSISTANT

## 2019-09-13 PROCEDURE — 99232 SBSQ HOSP IP/OBS MODERATE 35: CPT | Performed by: INTERNAL MEDICINE

## 2019-09-13 RX ORDER — LANOLIN ALCOHOL/MO/W.PET/CERES
3 CREAM (GRAM) TOPICAL
Status: DISCONTINUED | OUTPATIENT
Start: 2019-09-13 | End: 2019-09-15 | Stop reason: HOSPADM

## 2019-09-13 RX ORDER — GABAPENTIN 300 MG/1
600 CAPSULE ORAL 3 TIMES DAILY
Status: DISCONTINUED | OUTPATIENT
Start: 2019-09-13 | End: 2019-09-13

## 2019-09-13 RX ORDER — GABAPENTIN 300 MG/1
600 CAPSULE ORAL 2 TIMES DAILY
Status: DISCONTINUED | OUTPATIENT
Start: 2019-09-13 | End: 2019-09-15 | Stop reason: HOSPADM

## 2019-09-13 RX ORDER — CITALOPRAM HYDROBROMIDE 20 MG/1
40 TABLET ORAL DAILY
Status: DISCONTINUED | OUTPATIENT
Start: 2019-09-14 | End: 2019-09-15

## 2019-09-13 RX ORDER — ALBUTEROL SULFATE 0.83 MG/ML
2.5 SOLUTION RESPIRATORY (INHALATION) EVERY 4 HOURS PRN
Status: DISCONTINUED | OUTPATIENT
Start: 2019-09-13 | End: 2019-09-15 | Stop reason: HOSPADM

## 2019-09-13 RX ORDER — OXYCODONE HYDROCHLORIDE 5 MG/1
10-15 TABLET ORAL
Status: DISCONTINUED | OUTPATIENT
Start: 2019-09-13 | End: 2019-09-15 | Stop reason: HOSPADM

## 2019-09-13 RX ORDER — LIDOCAINE 4 G/G
2 PATCH TOPICAL
Status: DISCONTINUED | OUTPATIENT
Start: 2019-09-13 | End: 2019-09-15 | Stop reason: HOSPADM

## 2019-09-13 RX ADMIN — DICLOFENAC 4 G: 10 GEL TOPICAL at 22:12

## 2019-09-13 RX ADMIN — ACETAMINOPHEN 975 MG: 325 TABLET, FILM COATED ORAL at 05:39

## 2019-09-13 RX ADMIN — OXYCODONE HYDROCHLORIDE 10 MG: 5 TABLET ORAL at 06:40

## 2019-09-13 RX ADMIN — LIDOCAINE 2 PATCH: 560 PATCH PERCUTANEOUS; TOPICAL; TRANSDERMAL at 20:09

## 2019-09-13 RX ADMIN — CYCLOBENZAPRINE HYDROCHLORIDE 10 MG: 10 TABLET, FILM COATED ORAL at 22:12

## 2019-09-13 RX ADMIN — ACETAMINOPHEN 975 MG: 325 TABLET, FILM COATED ORAL at 20:08

## 2019-09-13 RX ADMIN — MELATONIN TAB 3 MG 3 MG: 3 TAB at 22:11

## 2019-09-13 RX ADMIN — CYCLOBENZAPRINE HYDROCHLORIDE 10 MG: 10 TABLET, FILM COATED ORAL at 08:23

## 2019-09-13 RX ADMIN — RANITIDINE 150 MG: 150 TABLET ORAL at 20:08

## 2019-09-13 RX ADMIN — GABAPENTIN 600 MG: 300 CAPSULE ORAL at 20:08

## 2019-09-13 RX ADMIN — CEFAZOLIN 1 G: 1 INJECTION, POWDER, FOR SOLUTION INTRAMUSCULAR; INTRAVENOUS at 05:39

## 2019-09-13 RX ADMIN — OXYCODONE HYDROCHLORIDE 15 MG: 5 TABLET ORAL at 20:08

## 2019-09-13 RX ADMIN — HYDROXYZINE HYDROCHLORIDE 25 MG: 25 TABLET ORAL at 10:56

## 2019-09-13 RX ADMIN — CITALOPRAM HYDROBROMIDE 20 MG: 20 TABLET ORAL at 08:23

## 2019-09-13 RX ADMIN — OXYCODONE HYDROCHLORIDE 10 MG: 5 TABLET ORAL at 03:22

## 2019-09-13 RX ADMIN — OXYCODONE HYDROCHLORIDE 10 MG: 5 TABLET ORAL at 09:41

## 2019-09-13 RX ADMIN — KETOROLAC TROMETHAMINE 30 MG: 30 INJECTION, SOLUTION INTRAMUSCULAR at 02:45

## 2019-09-13 RX ADMIN — OXYCODONE HYDROCHLORIDE 15 MG: 5 TABLET ORAL at 12:49

## 2019-09-13 RX ADMIN — GABAPENTIN 600 MG: 300 CAPSULE ORAL at 10:56

## 2019-09-13 RX ADMIN — OXYCODONE HYDROCHLORIDE 15 MG: 5 TABLET ORAL at 16:25

## 2019-09-13 RX ADMIN — OXYCODONE HYDROCHLORIDE 15 MG: 5 TABLET ORAL at 23:09

## 2019-09-13 RX ADMIN — LEVETIRACETAM 500 MG: 500 TABLET, FILM COATED ORAL at 08:23

## 2019-09-13 RX ADMIN — LEVETIRACETAM 1000 MG: 500 TABLET, FILM COATED ORAL at 20:08

## 2019-09-13 RX ADMIN — KETOROLAC TROMETHAMINE 30 MG: 30 INJECTION, SOLUTION INTRAMUSCULAR at 16:16

## 2019-09-13 RX ADMIN — OXYCODONE HYDROCHLORIDE 10 MG: 5 TABLET ORAL at 00:26

## 2019-09-13 RX ADMIN — HYDROXYZINE HYDROCHLORIDE 25 MG: 25 TABLET ORAL at 03:22

## 2019-09-13 RX ADMIN — SENNOSIDES AND DOCUSATE SODIUM 1 TABLET: 8.6; 5 TABLET ORAL at 08:23

## 2019-09-13 RX ADMIN — SENNOSIDES AND DOCUSATE SODIUM 1 TABLET: 8.6; 5 TABLET ORAL at 20:08

## 2019-09-13 RX ADMIN — ACETAMINOPHEN 975 MG: 325 TABLET, FILM COATED ORAL at 12:49

## 2019-09-13 RX ADMIN — KETOROLAC TROMETHAMINE 30 MG: 30 INJECTION, SOLUTION INTRAMUSCULAR at 08:24

## 2019-09-13 RX ADMIN — CYCLOBENZAPRINE HYDROCHLORIDE 10 MG: 10 TABLET, FILM COATED ORAL at 17:48

## 2019-09-13 ASSESSMENT — ACTIVITIES OF DAILY LIVING (ADL)
AMBULATION: 1-->ASSISTIVE EQUIPMENT
BATHING: 2-->ASSISTIVE PERSON
ADLS_ACUITY_SCORE: 17
TRANSFERRING: 0-->INDEPENDENT
RETIRED_EATING: 0-->INDEPENDENT
ADLS_ACUITY_SCORE: 17
ADLS_ACUITY_SCORE: 19
RETIRED_COMMUNICATION: 0-->UNDERSTANDS/COMMUNICATES WITHOUT DIFFICULTY
ADLS_ACUITY_SCORE: 20
TOILETING: 0-->INDEPENDENT
SWALLOWING: 0-->SWALLOWS FOODS/LIQUIDS WITHOUT DIFFICULTY
ADLS_ACUITY_SCORE: 19
NUMBER_OF_TIMES_PATIENT_HAS_FALLEN_WITHIN_LAST_SIX_MONTHS: 10
WHICH_OF_THE_ABOVE_FUNCTIONAL_RISKS_HAD_A_RECENT_ONSET_OR_CHANGE?: AMBULATION;FALL HISTORY
COGNITION: 0 - NO COGNITION ISSUES REPORTED
ADLS_ACUITY_SCORE: 17
DRESS: 2-->ASSISTIVE PERSON
FALL_HISTORY_WITHIN_LAST_SIX_MONTHS: YES

## 2019-09-13 NOTE — CONSULTS
"CLINICAL NUTRITION SERVICES  -  ASSESSMENT NOTE      Malnutrition: Patient does not meet malnutrition criteria at this time        REASON FOR ASSESSMENT  Josselin Cunha is a 59 year old female seen by Registered Dietitian for Provider Order - Nutrition Education - \"patient with 14 pound weight loss prior to surgery, states she had no appetite due to pain.  Please educate on optimized diet for successful post operative nutrition plan\".        NUTRITION HISTORY  - Information obtained from patient and chart.  - Patient with a h/o Crohn's, previous drug overdose, chronic back pain.  Admitted for fusion.  - Regular diet at home.  Likes to focus on lean meats, likes to grill, soups.  Describes that  is picky and also has a grandson she often eats with.  Feels she focuses on protein at home.    - Eats at least 2 meals/day.    - Describes poor appetite d/t pain for a period of 3 months.  Not consistently eating less than usual during this time, though did have periods of days where she was eating less.    - NKFA.      CURRENT NUTRITION ORDERS  Diet Order:     Regular    Current Intake/Tolerance:  Minimal opportunity for PO intakes since admission.        NUTRITION FOCUSED PHYSICAL ASSESSMENT FOR DIAGNOSING MALNUTRITION)  Completed:  Yes Full assessment         Observed:    No fat or muscle loss observed    Obtained from Chart/Interdisciplinary Team:  Refer to Spinal Surgery op note 9/12/2019 - fusion    ANTHROPOMETRICS  Height: 5' 3\"  Weight: 88.9 kg (196#)  Body mass index is 34.72 kg/m .  Weight Status:  Obesity Grade I BMI 30-34.9  IBW: 52.3 kg (115#)  % IBW: 170%  Weight History:  Wt Readings from Last 10 Encounters:   09/12/19 88.9 kg (196 lb)   01/17/19 97.5 kg (215 lb)   - Minimal recent wts available in care everywhere.  - Wt of 200# from 8/13/2019 indicating small degree of stability over the past month.   - 15-20# wt loss w//in 3 months report.    - Believes she weighed 224# on 1/17/2019 per her report, " not consistent with wt trending above.    LABS  Labs reviewed    MEDICATIONS  Medications reviewed      ASSESSED NUTRITION NEEDS PER APPROVED PRACTICE GUIDELINES:    Dosing Weight 88.9 kg  Estimated Energy Needs: >/=2223 kcals (>/=25 Kcal/Kg)  Justification: minimum maintenance  Estimated Protein Needs: >/=89 grams protein (>/=1 g pro/Kg)  Justification: preservation of lean body mass and post-op  Estimated Fluid Needs: per MD    MALNUTRITION:  % Weight Loss:  Weight loss does not meet criteria for malnutrition   % Intake:  Decreased intake does not meet criteria for malnutrition   Subcutaneous Fat Loss:  None observed  Muscle Loss:  Clavicle bone region mild depletion and Acromion bone region mild depletion --> suspect age-related changes  Fluid Retention:  None noted    Malnutrition Diagnosis: Patient does not meet two of the above criteria necessary for diagnosing malnutrition    NUTRITION DIAGNOSIS:  Predicted suboptimal nutrient intake (energy/protein) related to potential for decreased appetite/intake throughout admit as reported wt loss PTA, discrepancy in wt reported and that recorded.    NUTRITION INTERVENTIONS  Recommendations / Nutrition Prescription  Continue regular diet order.     Protein focus from food, denied supplement offering.      Implementation  Nutrition education: Provided education on protein sources from food.  Encouraged consistency at meal times.    Collaboration and Referral of Nutrition care: Discussed POC with RN.      Nutrition Goals  Patient to consume at least 75% meals TID with protein source at each meal.       MONITORING AND EVALUATION:  Progress towards goals will be monitored and evaluated per protocol and Practice Guidelines            Ivory Sierra RD, LD  Clinical Dietitian  3rd floor/ICU: [phone]  All other floors: [phone]  Weekend/holiday: [phone]

## 2019-09-13 NOTE — PLAN OF CARE
Pt struggled with pain control this am.  Pain team is following pt.  Pt had 15mg of oxycodone and about 2 hours later pt was requesting the IV dilaudid.   pt is a little off and confused. Pt was getting up out of bed by herself. I did not give her the IV dilaudid.  Pt is up with standby assist , brace and walker.

## 2019-09-13 NOTE — PROGRESS NOTES
SPIRITUAL HEALTH SERVICES Progress Note  [facility]    Brief follow up visit per pt request. Pt encouraged by progress today and shared in light conversation about her family, particularly her grandsons.      Plan: Spiritual Health Services remains available for additional emotional/spiritual support.    Walker Mathew MA  Staff   Pager: [phone]  Phone: [phone]

## 2019-09-13 NOTE — ANESTHESIA POSTPROCEDURE EVALUATION
Patient: Josselin Cunha    Procedure(s):  L1-L3 Transforaminal or Oblique Interbody Fusion, T11-L4 Posteriolateral Fusion and Revision Instrumentation, T11-S1 Infuse    Diagnosis:failed l3-l4, ddd adjacent level disease  Diagnosis Additional Information: failed fusion L3 L4 and DJD and kyphosis thoracolumbar junction    Anesthesia Type:  General, ETT    Note:  Anesthesia Post Evaluation    Patient location during evaluation: PACU  Patient participation: Able to fully participate in evaluation  Level of consciousness: awake  Pain management: adequate  Airway patency: patent  Cardiovascular status: acceptable  Respiratory status: acceptable  Hydration status: acceptable  PONV: controlled     Anesthetic complications: None          Last vitals:  Vitals:    09/12/19 1945 09/12/19 2014 09/12/19 2053   BP: 131/88 122/84 (!) 143/92   Pulse: 91 92 95   Resp: 15 13 15   Temp:  96.5  F (35.8  C)    SpO2: 98% 98% 100%         Electronically Signed By: Eddi Lombardo MD  September 12, 2019  9:34 PM

## 2019-09-13 NOTE — PLAN OF CARE
RN from [id number]. Pt very tearful and frustrated at beginning of shift. Calm and pleasant towards end, rambles at times. Forgetful. Tolerating regular diet. Transfers with Ax1, gait belt, TLSO brace, and walker. Dressing CDI, intermittent baseline tingling to hands per pt. Dimas removed this shift, pt voided. Hemovac compressed. Pain managed with prn Oxycodone, Flexeril, and Atarax. Plans d/c to home.

## 2019-09-13 NOTE — CONSULTS
[facility]  Pain Service Consultation   Text Page    Date of Admission:  9/12/2019    Assessment & Plan   Josselin Cunha is a 59 year old female who was admitted on 9/12/2019. I was asked by LEONCIO Cordero to see the patient for acute post operative pain s/p Spinal fusion.    1)  Chronic low back and radiating leg pain, R>L. Now POD #1 s/p T11 - Sacral fusion    2)  Patient with chronic low back and radiating leg pain, on chronic opioid therapy managed by Dr. Love (surgery), and [facility] (PCP)  Baseline 20-80 mg Daily Morphine Equivalent as reported by patient.  States she has been taking oxycodone, however most recent scripts in the  reflect Codeine- acetaminophen and hydrocodone-acetaminophen prescriptions.      Patient has a possible opioid tolerance.      Patient's opioid use in past 24 hours: fentanyl  mcg, dilaudid IV 1.7 mg, oxycodone PO 35 mg = 191.5 mg Daily Morphine Equivalent.    Additional dose of methadone 20mg given perioperatively yesterday which has a non-linear pharmacokinetic half-life.    3)  Risk factors for opioid related harms  -History of overdose  -High opioid dose (>50 MME/day)  -Anxiety/depression  -Opioid has recently been changed    4)  Opioid induced side-effects:  -Constipation - no history, but at risk due to new opioid use.      5)  Other/Related:    -Depression/anxiety - baseline citalopram, history of sertraline and bupropion prescriptions, but states she no longer takes those medications. Patient states significant pain and debility related anxiety.  Tearful during assessment and states she has talked to her PCP about increasing her citalopram recently.    -Deconditioning - significantly reduced function for the months prior to surgery, baseline weakness and multiple falls at home prior to surgery.  - Chron's disease - moderate diarrhea 1-3 times per week at baseline.    - Concern for malnutrition - patient states her appetite has been poor due to  chronic pain, states she has lost 14-18 pounds prior to surgery.  Unsure if meeting nutritional needs for adequate healing post surgery.    PLAN:   1)  Nutrition consult, evaluate and educate for meeting dietary needs post op.  2)   follow up, patient finds comfort in visit from .  3)Non-opioid multimodal medication therapy  -Topical:Voltaren 1% Topical Gel four times daily to luz maria-incisional area, Lidocaine Patch 4% apply patches to either side of incision every evening.  -N-SAIDS:Ketorolac as ordered per surgical team.  -Muscle Relaxants:Cyclobenzaprine 10 mg TID prn  -Adjuvants:Acetaminophen 975 mg every 8 hours, Gabapentin 600 mg BID, Hydroxyzine 25 mg every 6 hours prn  -Antidepresants/anxiolytics: Citalopram 40 mg (increase from home dose).  - melatonin 3 mg at 1800 for sleep.  4)  Non-medication interventions  Positioning, Heating pad PRN, ICE, Relaxation, Meditation, Essential oils per nursing, Physical therapy as ordered, Brace wear when out of bed.  5)  Opioids:  - oxycodone 10-15 mg q 3 hours prn    - would recommend titration to oxycodone 5-10 mg q 3 hours prior to discharge.  - dilaudid 0.5 - 1 mg q 2 hours prn for breakthrough pain.  Opioids Treatment Goal:   - Improvement in function  - Participate in PT  6)  Constipation Prophylaxis   Senna-S 1-2 tablets two times daily.  Hold for loose stool.  7)  Pain Education  -Opioid safe use, storage and disposal information included in DC AVS  8)  DC Planning   Discussed goal of Opioid therapy as above with patient and Dr. Maria, hositalist.  Discharge opiate plan per surgical team.    Continued outpatient management of pain per Dr. Love and patient's PCP with [facility]  Disposition: pending.  Support systems: adult children, spouse.  Outpatient Referrals: per surgical team.    - The following risk factors have been identified for unintentional overdose: patient is on multiple sedating medications , patient is not expected to have  previous tolerance given gap in opioid use, patient is overweight, patient has a history of overdose, patient has anxiety, depression or PTSD and patient has been switched to a new opioid medication.   - Discharge with intra-nasal naloxone if discharged to home with opioids  >40 mg MME/day.  Plan for education prior to discharge    Time Spent on this Encounter   Total unit/floor time 60 minutes, time consisted of the following, examination of the patient, reviewing the record and completing documentation. >50% of time spent in counseling and coordination of care.  Time spend counseling with patient consisted of the following topics, goals of care, care planning for discharge and symptom management.  Time spent in coordination of care with Bedside Nurse Fannie and Hospitalist Dr. Maria.     Carline DHILLON, CNP  Pain Management and Palliative Care  [facility]  Pgr: [phone]    Reason for Consult   Reason for consult: I was asked by Josselin Ryan to evaluate this patient for acute post operative pain.    Primary Care Physician   Primary Care Physician:[facility]  Pain Specialist: n/a    Chief Complaint   S/p T11 - sacral fusion, chronic low back and right leg pain.    History is obtained from the patient    History of Present Illness   Josselin Cunha is a 59 year old female who presented for elective T11 - Sacral fusion with Dr. Love on 9/12/2019.  She has had multiple back surgeries in the past by Dr. Love and states that her back has continued to be a significant problem.  States her pain has been debilitating and that she has tried her best to mobilize and stay active, but has had multiple falls at home and has limited herself to primarily bed-bound and has stopped driving due to weakness and pain.  States she has had opiates in the past for the pain, but has preferred to stay on non-opiate pain regimen when she can.      Reports that prior to this surgery she had been taking  oxycodone, gabapentin, tylenol and ibuprofen to control the pain, but that she had not had adequate pain control in months.  States she has received medications from her primary care provider at [facility] as well as from Dr. Love.  Reports that she has had decreased appetite and has decrease quality of life due to this.  She states she is hopeful that surgery is going to resolve the majority of the pain and functional mobility issues.  She endorses anxiety and depression due to her chronic pain and becomes tearful at times due to this.  States she wants to keep an optimistic outlook, but is scared and anxious that she won't have the results she hopes for.    CURRENT PAIN:  Her pain is located in the low back and right buttock  It is described as Aching, Burning, Stabbing, Tender and Tiring  She rates it as ranging between 6/10 and 8/10  The average is 8/10 on a scale of 0-10  Currently it is rated as 6/10  It improves by medications, immobility.  It worsens by movement, lapse in pain medication coverage  She has been compliant with the recommendations while in the hospital.      PAIN HISTORY:  The pain is mainly located in the low back, right buttock, right leg  It is described as Aching, Burning, Exhausting, Miserable and Tiring  Rates it as ranging between 4/10 and 10/10      PAST PAIN TREATMENT:   Medications: oxycodone, hydrocodone, codeine, gabapentin, cyclobenzaprine, celexa, diazepam,   Non-phamacologic modalities: medication, exercise/PT,   Previous interventions/surgeries:multiple back surgeries by Dr. Love, L3-4 fusion most recently.    [facility] database review: 8/24 Hydrocodone-Acetaminophen  mg #54, 9/11 Codeine - acetaminophen #10.           D.I.R.E Score: Patient Selection for Chronic Opioid Analgesia    For each factor, rate the patient's score from 1 - 3 based on the explanations on the right.       Diagnosis             2         1 = Benign chronic condition with minimal objective findings  or no definite medical diagnosis.  Examples:  fibromyalgia, migraine, headaches, non-specific back pain.  2 = Slowly progressive condition concordant with moderate pain, or fixed condition with moderate objective findings.  Examples: failed back surgery syndrome, back pain with moderate degenerative changes, neuropathic pain.  3 = Advanced condition concordant with severe pain with objective findings.  Examples: severe ischemic vascular disease, advanced neuropathy, severe spinal stenosis.    Intractability             3         1 = Few therapies have been tried and the patient takes a passive role in his/her pain management process.   2 = Most costomary treatments have been tried but the patient is not fully engaged in the pain management process, or barriers prevent (insurance, transportation, medical illness)  3 = Patient fully engaged in a spectrum of appropriate treatments but with inadequate response.    Risk   (Risk = Total of P+C+R+S below)       Psychological             2         1 = Serious personality dysfunction or mental illness interfering with care.  Examples: personality disorder, severe affective disorder, significant personality issues.  2 = Personality or mental health interferes moderately.  Example: depression or anxiety disorder.  3 = Good communication with the clinic.  No significant personality dysfunction or mental illness.       Chemical      Health             2         1 = Active or very recent use of illicit drugs, excessive alcohol, or prescription drug abuse.  2 = Chemical coper (uses medications to cope with stress) or history of chemical dependency in remission.  3 = No CD history.  Not drug-focused or chemically reliant       Reliability             2         1 = History of numerous problems: medication misuse, missed appointments, rarely follows through.  2 = Occasional difficulties with compliance, but generally reliable.  3 = Highly reliable patient with medications,  appointments and treatment.       Social      Support             2         1= Life in chaos.  Little family support and few close relationships.  Loss of most normal life roles.  2 = Reduction in some relationships and life roles.  3 = Supportive family/close relationships.  Involved in work or school and no social isolation.    Efficacy score             2         1 = Poor function or minimal pain relief despite moderate to high doses.  2 = Moderate benefit with function improved in a number of ways (or insufficient info - hasn't tried opioid yet or very low doses or too short a trial.  3 = Good improvement in pain and function and quality of life with stable doses over time.                                    15    Total score = D + I + R + E    Score 7-13: Not a suitable candidate for long-term opioid analgesia  Score 14-21: May be a good candidate for long-term opioid analgesia    Copyright 2013 Carrington Mcmahon MD, The DIRE Score: Predicting Outcomes of Opioid Prescribing for Chronic Pain. The Journal of Pain. 7(9) (September), 2006:671-681    Past Medical History   I have reviewed this patient's medical history and updated it with pertinent information if needed.   Past Medical History:   Diagnosis Date     Acute Crohn's disease (H)      Complication of anesthesia     needs nebs before surgery     COPD (chronic obstructive pulmonary disease) (H)      Drug overdose      Hypertension      Noninfectious ileitis     Chrons     Other chronic pain     back     PTSD (post-traumatic stress disorder)      Seizures (H)     last seizure three years ago     Sleep apnea     not tested but thinks she has it     Uncomplicated asthma        Past Surgical History   I have reviewed this patient's surgical history and updated it with pertinent information if needed.  Past Surgical History:   Procedure Laterality Date     ABDOMEN SURGERY      4 c-sections     BACK SURGERY      4 back surgeries     DECOMPRESSION, FUSION LUMBAR  POSTERIOR THREE + LEVELS, COMBINED N/A 9/12/2019    Procedure: 1.  Removal of prior instrumentation, L3, L4, L5.  2.  Exploration of spinal fusion.  3.  Repeat posterolateral fusion at L3-L4.  4.  Posterior fusion, T11-T12, L1, L2, L3.  5.  Segmental instrumentation T11-12, L2, L2, L3 L4, L5 and sacrum using iliac screws.;  Surgeon: Christian Love MD;  Location: [facility] OR     DECOMPRESSION, FUSION LUMBAR POSTERIOR TWO LEVELS, COMBINED N/A 1/17/2019    Procedure: L3 - L4  oblique lateral lumbar interbody fusion  L3 Posterior minimally invasive pedicle screw placement and posterolateral instrumentation L3 to L4  and  Posterior fusion  cut off old maine below L4 and remove old screws at L4 bilateral and replacement;  Surgeon: Christian Love MD;  Location: [facility] OR     EXPLORE SPINE, REMOVE HARDWARE, COMBINED N/A 9/12/2019    Procedure: Explore spine, remove hardware, combined;  Surgeon: Christian Love MD;  Location: [facility] OR     GI SURGERY  2014    bowel resection for chrons     ORTHOPEDIC SURGERY Bilateral     knee replacement         Prior to Admission Medications   Prior to Admission Medications   Prescriptions Last Dose Informant Patient Reported? Taking?   HYDROcodone-acetaminophen (NORCO)  MG per tablet   Yes Yes   Sig: Take 1 tablet by mouth every 4 hours as needed for severe pain   SUMAtriptan (IMITREX) 100 MG tablet Past Week at Unknown time  Yes Yes   Sig: Take 100 mg by mouth at onset of headache for migraine   acetaminophen-codeine (TYLENOL #3) 300-30 MG tablet 9/11/2019 at 2000  Yes Yes   Sig: Take 2 tablets by mouth every 4 hours as needed for severe pain   albuterol (PROAIR HFA/PROVENTIL HFA/VENTOLIN HFA) 108 (90 Base) MCG/ACT inhaler Past Week at Unknown time  Yes Yes   Sig: Inhale 2 puffs into the lungs every 4 hours as needed for shortness of breath / dyspnea or wheezing   citalopram (CELEXA) 20 MG tablet 9/11/2019 at 0800  Yes Yes   Sig: Take 20 mg by mouth daily   cyclobenzaprine (FLEXERIL) 10 MG  tablet 9/11/2019 at 0800  Yes Yes   Sig: Take 10 mg by mouth 3 times daily as needed for muscle spasms   gabapentin (NEURONTIN) 300 MG capsule 9/11/2019 at 1630  Yes Yes   Sig: Take 600 mg by mouth 2 times daily Takes 2 capsules in the morning and 2 capsules in the evening    hydrochlorothiazide (HYDRODIURIL) 25 MG tablet 9/11/2019 at 0800  Yes Yes   Sig: Take 25 mg by mouth daily   levETIRAcetam (KEPPRA) 1000 MG tablet 9/11/2019 at 2000  Yes Yes   Sig: Take 1,000 mg by mouth every evening    levETIRAcetam (KEPPRA) 500 MG tablet 9/12/2019 at 0530  Yes Yes   Sig: Take 500 mg by mouth every morning   lisinopril (PRINIVIL/ZESTRIL) 10 MG tablet 9/12/2019 at 0530  Yes Yes   Sig: Take 10 mg by mouth daily   sertraline (ZOLOFT) 50 MG tablet   Yes Yes   Sig: Take 50 mg by mouth daily      Facility-Administered Medications: None     Allergies   Allergies   Allergen Reactions     Tramadol      Instructed not to take per neurologist       Social History   I have reviewed this patient's social history and updated it with pertinent information if needed. Josselin Cunha  reports that she quit smoking about 10 months ago. She has never used smokeless tobacco. She reports that she does not drink alcohol or use drugs.    Family History   I have reviewed this patient's family history and updated it with pertinent information if needed.   Family History   Problem Relation Age of Onset     Diabetes Mother      Atrial fibrillation Mother      Diabetes Father      Family history of addiction yes, father and brother.    Review of Systems   The 10 point Review of Systems is negative other than noted in the HPI or here.    Denies Bowel or bladder dysfunction    Physical Exam   Temp:  [96.5  F (35.8  C)-97.9  F (36.6  C)] 96.6  F (35.9  C)  Pulse:  [] 91  Heart Rate:  [74-95] 91  Resp:  [8-23] 16  BP: ()/() 98/51  SpO2:  [94 %-100 %] 94 %  196 lbs 0 oz  GEN:  Alert, oriented x 3, appears comfortable, No apparent  distress.  HEENT:  Normocephalic/atraumatic, no scleral icterus, no nasal discharge, mouth moist.  CV:  RRR, S1, S2; no murmurs or other irregularities noted.  +3 DP/PT pulses bilaterally; no edema bilateral lower extremeties.  RESP:  Clear to auscultation bilaterally without rales/rhonchi/wheezing/retractions.  Symmetric chest rise on inhalation noted.  Normal respiratory effort.  ABD:  Rounded, soft, non-tender/non-distended.  +BS  EXT:  Edema & pulses as noted above.  Color, moisture and sensation intact x 4.     M/S:   Tender to palpation over right buttock and hip.    SKIN:  Dry to touch, no exanthems noted in the visualized areas.    NEURO: Symmetric strength +5/5.  Sensation to touch intact all extremities, notes tinging, pain in right leg.   There is no area of allodynia or hyperesthesia.  PAIN BEHAVIOR: Cooperative  Psych:  Normal affect.  Anxious, cooperative, conversant appropriately.       Data   Most Recent 3 CBC's:  Recent Labs   Lab Test 09/13/19  0724 09/12/19  0656 01/17/19  0606   HGB 9.2* 11.9 13.6     Most Recent 3 BMP's:  Recent Labs   Lab Test 09/13/19  0724      POTASSIUM 4.4   CHLORIDE 107   CO2 30   BUN 18   CR 0.54   ANIONGAP 2*   CHARLES 8.0*   *

## 2019-09-13 NOTE — CONSULTS
[facility]  Consult Note - Hospitalist Service     Date of Admission:  9/12/2019  Consult Requested by: Christian Love  Reason for Consult: Post-operative medical management     Assessment & Plan   Josselin Cunha is a 59 year old female admitted on 9/12/2019 after a spine surgery.     1. S/P Procedure(s):  1.  Removal of prior instrumentation, L3, L4, L5.  2.  Exploration of spinal fusion.  3.  Repeat posterolateral fusion at L3-L4.  4.  Posterior fusion, T11-T12, L1, L2, L3.  5.  Segmental instrumentation T11-12, L2, L2, L3 L4, L5 and sacrum using iliac screws.  Explore spine, remove hardware, combined    Pre-op diagnosis: failed l3-l4, ddd adjacent level disease    The patient is doing well, currently hemodynamically stable. Will defer diet, activity, wound care, DVT prophylaxis, and pain control to the primary surgical team. Continue physical and occupational therapy. Social work consulted for possible placement needs. Recommend incentive spirometry and monitoring of hemoglobin to evaluate for surgical blood loss and potential need for transfusion.   -- Main concern for patient is pain control. Tearful during the interview. Pain team has been consulted. Oxycodone has been increased to 10-15 mg q3h PRN. She received a dose of methadone for pain control in the luz maria-op period? hence, will carefully watch for respiratory issues and consider tapering down the oxycodone as pain control improves     2. HTN. Resume Lisinopril and hydrochlorothiazide. BP is soft but stable, will add hold parameters    3. Seizure disorder. Resume Keppra    4. Crohn's disease. Stable     5. Psych. Home medications have been resumed.     Code status: Full Code  DVT Prophylaxis: Defer to primary service.   Disposition: Pending progress with therapies     Thank you for the consultation, we will continue to follow along during the hospitalization. The recommendations will be communicated through this note. Please page with any  questions or concerns.    The patient's care was discussed with the Bedside Nurse, Patient and Pain Team.    Rashad Maria MD  [facility]    ______________________________________________________________________    Chief Complaint   Post-operative state     History is obtained from the patient    History of Present Illness   Josselin Cunha is a 59 year old female who is hospitalized after DECOMPRESSION, SPINE, LUMBAR, 3 OR MORE LEVELS, POSTERIOR APPROACH, WITH FUSION. Pre-operative note was fully reviewed and recommendations acknowledged.     The patient had no notable complication related to the procedure and has had an unremarkable post-operative course to this point. Main concern is pain control and working with pain consult team on that. No nausea, vomiting, diarrhea or constipation. No fevers, chills, diaphoresis. No chest pain, palpitations, dyspnea. Tolerating oral intake. No excessive somnolence and patient is fully alert and oriented. The patient has no other complaints at this time.     Review of Systems   A comprehensive 10 system review of systems was carried out.  Pertinent positives and negatives are noted above.  Otherwise negative for contributory info.    Past Medical History    Past Medical History:   Diagnosis Date     Acute Crohn's disease (H)      Complication of anesthesia     needs nebs before surgery     COPD (chronic obstructive pulmonary disease) (H)      Drug overdose      Hypertension      Noninfectious ileitis     Chrons     Other chronic pain     back     PTSD (post-traumatic stress disorder)      Seizures (H)     last seizure three years ago     Sleep apnea     not tested but thinks she has it     Uncomplicated asthma        Past Surgical History   Past Surgical History:   Procedure Laterality Date     ABDOMEN SURGERY      4 c-sections     BACK SURGERY      4 back surgeries     DECOMPRESSION, FUSION LUMBAR POSTERIOR THREE + LEVELS, COMBINED N/A 9/12/2019     Procedure: 1.  Removal of prior instrumentation, L3, L4, L5.  2.  Exploration of spinal fusion.  3.  Repeat posterolateral fusion at L3-L4.  4.  Posterior fusion, T11-T12, L1, L2, L3.  5.  Segmental instrumentation T11-12, L2, L2, L3 L4, L5 and sacrum using iliac screws.;  Surgeon: Christian Love MD;  Location: [facility] OR     DECOMPRESSION, FUSION LUMBAR POSTERIOR TWO LEVELS, COMBINED N/A 2019    Procedure: L3 - L4  oblique lateral lumbar interbody fusion  L3 Posterior minimally invasive pedicle screw placement and posterolateral instrumentation L3 to L4  and  Posterior fusion  cut off old maine below L4 and remove old screws at L4 bilateral and replacement;  Surgeon: Chrisitan Love MD;  Location: [facility] OR     EXPLORE SPINE, REMOVE HARDWARE, COMBINED N/A 2019    Procedure: Explore spine, remove hardware, combined;  Surgeon: Christian Love MD;  Location: [facility] OR     GI SURGERY      bowel resection for chrons     ORTHOPEDIC SURGERY Bilateral     knee replacement       Social History   Social History     Tobacco Use     Smoking status: Former Smoker     Last attempt to quit: 2018     Years since quittin.8     Smokeless tobacco: Never Used   Substance Use Topics     Alcohol use: No     Frequency: Never     Drug use: No       Family History   Family History   Problem Relation Age of Onset     Diabetes Mother      Atrial fibrillation Mother      Diabetes Father        Medications   Medications Prior to Admission   Medication Sig Dispense Refill Last Dose     acetaminophen-codeine (TYLENOL #3) 300-30 MG tablet Take 2 tablets by mouth every 4 hours as needed for severe pain   2019 at 2000     albuterol (PROAIR HFA/PROVENTIL HFA/VENTOLIN HFA) 108 (90 Base) MCG/ACT inhaler Inhale 2 puffs into the lungs every 4 hours as needed for shortness of breath / dyspnea or wheezing   Past Week at Unknown time     citalopram (CELEXA) 20 MG tablet Take 20 mg by mouth daily   2019 at 0800     cyclobenzaprine  (FLEXERIL) 10 MG tablet Take 10 mg by mouth 3 times daily as needed for muscle spasms   9/11/2019 at 0800     gabapentin (NEURONTIN) 300 MG capsule Take 600 mg by mouth 2 times daily Takes 2 capsules in the morning and 2 capsules in the evening    9/11/2019 at 1630     hydrochlorothiazide (HYDRODIURIL) 25 MG tablet Take 25 mg by mouth daily   9/11/2019 at 0800     HYDROcodone-acetaminophen (NORCO)  MG per tablet Take 1 tablet by mouth every 4 hours as needed for severe pain        levETIRAcetam (KEPPRA) 1000 MG tablet Take 1,000 mg by mouth every evening    9/11/2019 at 2000     levETIRAcetam (KEPPRA) 500 MG tablet Take 500 mg by mouth every morning   9/12/2019 at 0530     lisinopril (PRINIVIL/ZESTRIL) 10 MG tablet Take 10 mg by mouth daily   9/12/2019 at 0530     sertraline (ZOLOFT) 50 MG tablet Take 50 mg by mouth daily   1/16/2019 at Unknown time     SUMAtriptan (IMITREX) 100 MG tablet Take 100 mg by mouth at onset of headache for migraine   Past Week at Unknown time       Allergies   Allergies   Allergen Reactions     Tramadol      Instructed not to take per neurologist       Physical Exam   Vital Signs: Temp: 96.6  F (35.9  C) Temp src: Oral BP: 98/51 Pulse: 91 Heart Rate: 91 Resp: 16 SpO2: 94 % O2 Device: None (Room air) Oxygen Delivery: 2 LPM  Weight: 196 lbs 0 oz    GENERAL: No apparent distress. Awake, alert, and fully oriented.  HEENT: Normocephalic, atraumatic. Extraocular movements intact.  CARDIOVASCULAR: Regular rate and rhythm. Normal S1, S2   PULMONARY: Clear bilaterally   ABDOMINAL: Soft, non-tender, non-distended   EXTREMITIES: No cyanosis or clubbing. No pitting edema.  NEUROLOGICAL: Awake and alert, oriented x 3, no gross focal neurological deficits  DERMATOLOGICAL: No rash, ulcer, ecchymoses, jaundice.    Data   Lab data and imaging results from the last 24 hours have been reviewed.   Results for orders placed or performed during the hospital encounter of 09/12/19 (from the past 24  "hour(s))   Basic metabolic panel   Result Value Ref Range    Sodium 140 133 - 144 mmol/L    Potassium 4.4 3.4 - 5.3 mmol/L    Chloride 107 94 - 109 mmol/L    Carbon Dioxide 30 20 - 32 mmol/L    Anion Gap 2 (L) 3 - 14 mmol/L    Glucose 122 (H) 70 - 99 mg/dL    Urea Nitrogen 18 7 - 30 mg/dL    Creatinine 0.54 0.52 - 1.04 mg/dL    GFR Estimate >90 >60 mL/min/[1.73_m2]    GFR Estimate If Black >90 >60 mL/min/[1.73_m2]    Calcium 8.0 (L) 8.5 - 10.1 mg/dL   Hemoglobin   Result Value Ref Range    Hemoglobin 9.2 (L) 11.7 - 15.7 g/dL   Nutrition Services Adult IP Consult    Narrative    Ivory Sierra, GIL, LD     9/13/2019  2:27 PM  CLINICAL NUTRITION SERVICES  -  ASSESSMENT NOTE      Malnutrition: Patient does not meet malnutrition criteria at this   time        REASON FOR ASSESSMENT  Josselin Cunha is a 59 year old female seen by Registered   Dietitian for Provider Order - Nutrition Education - \"patient   with 14 pound weight loss prior to surgery, states she had no   appetite due to pain.  Please educate on optimized diet for   successful post operative nutrition plan\".        NUTRITION HISTORY  - Information obtained from patient and chart.  - Patient with a h/o Crohn's, previous drug overdose, chronic   back pain.  Admitted for fusion.  - Regular diet at home.  Likes to focus on lean meats, likes to   grill, soups.  Describes that  is picky and also has a   grandson she often eats with.  Feels she focuses on protein at   home.    - Eats at least 2 meals/day.    - Describes poor appetite d/t pain for a period of 3 months.  Not   consistently eating less than usual during this time, though did   have periods of days where she was eating less.    - NKFA.      CURRENT NUTRITION ORDERS  Diet Order:     Regular    Current Intake/Tolerance:  Minimal opportunity for PO intakes since admission.        NUTRITION FOCUSED PHYSICAL ASSESSMENT FOR DIAGNOSING   MALNUTRITION)  Completed:  Yes Full assessment       " "  Observed:    No fat or muscle loss observed    Obtained from Chart/Interdisciplinary Team:  Refer to Spinal Surgery op note 9/12/2019 - fusion    ANTHROPOMETRICS  Height: 5' 3\"  Weight: 88.9 kg (196#)  Body mass index is 34.72 kg/m .  Weight Status:  Obesity Grade I BMI 30-34.9  IBW: 52.3 kg (115#)  % IBW: 170%  Weight History:  Wt Readings from Last 10 Encounters:   09/12/19 88.9 kg (196 lb)   01/17/19 97.5 kg (215 lb)   - Minimal recent wts available in care everywhere.  - Wt of 200# from 8/13/2019 indicating small degree of stability   over the past month.   - 15-20# wt loss w//in 3 months report.    - Believes she weighed 224# on 1/17/2019 per her report, not   consistent with wt trending above.    LABS  Labs reviewed    MEDICATIONS  Medications reviewed      ASSESSED NUTRITION NEEDS PER APPROVED PRACTICE GUIDELINES:    Dosing Weight 88.9 kg  Estimated Energy Needs: >/=2223 kcals (>/=25 Kcal/Kg)  Justification: minimum maintenance  Estimated Protein Needs: >/=89 grams protein (>/=1 g pro/Kg)  Justification: preservation of lean body mass and post-op  Estimated Fluid Needs: per MD    MALNUTRITION:  % Weight Loss:  Weight loss does not meet criteria for   malnutrition   % Intake:  Decreased intake does not meet criteria for   malnutrition   Subcutaneous Fat Loss:  None observed  Muscle Loss:  Clavicle bone region mild depletion and Acromion   bone region mild depletion --> suspect age-related changes  Fluid Retention:  None noted    Malnutrition Diagnosis: Patient does not meet two of the above   criteria necessary for diagnosing malnutrition    NUTRITION DIAGNOSIS:  Predicted suboptimal nutrient intake (energy/protein) related to   potential for decreased appetite/intake throughout admit as   reported wt loss PTA, discrepancy in wt reported and that   recorded.    NUTRITION INTERVENTIONS  Recommendations / Nutrition Prescription  Continue regular diet order.     Protein focus from food, denied supplement " offering.      Implementation  Nutrition education: Provided education on protein sources from   food.  Encouraged consistency at meal times.    Collaboration and Referral of Nutrition care: Discussed POC with   RN.      Nutrition Goals  Patient to consume at least 75% meals TID with protein source at   each meal.       MONITORING AND EVALUATION:  Progress towards goals will be monitored and evaluated per   protocol and Practice Guidelines            Ivory Sierra RD, LD  Clinical Dietitian  3rd floor/ICU: [phone]  All other floors: [phone]  Weekend/holiday: [phone]

## 2019-09-13 NOTE — PLAN OF CARE
Dangled at bedside with minimal assistance. Needs reminders on log rolling. Restless throughout night. On 2L with capnography. Intermittent illogical statements, although alert. Dimas & hemovac patent. Hypoactive bowels, no gas. Tolerated several lata crackers and drinking well. Dressing is clean, dry, and intact.  Pain managed with PRN oxy, atarax, ice, and scheduled medications. She is un-sure if she would be safe at home following discharge as her  will not be there during the day. Wants to discuss TCU.

## 2019-09-13 NOTE — PROGRESS NOTES
Vitals stable.  No real leg pain. Neuro intact.  Stood up. Hgb 9.2.  Ambulate.  Most likely home Sunday.

## 2019-09-13 NOTE — PLAN OF CARE
PT: PT eval completed. Pt here status post L1-L3 Transforaminal or Oblique Interbody Fusion, T11-L4 Posteriolateral Fusion and Revision Instrumentation, T11-S1 Infuse. Pt has PMH of HTN, COPD, PTSD, and sz.   Discharge Planner PT   Patient plan for discharge: unsure of DISCHARGE plan, she is debating on home to a friends home or her home, also even considering home vs. TCU  Current status: Pt was CGA for supine to sit, CGA x 2 for safety due to neuropathy and frequent falls. pt able to ambulate 100 feet with close W/C follow due to her neuropathy and falls risk, NBOS, poor ankle stability and foot flat contact.   Barriers to return to prior living situation: falls risk  Recommendations for discharge: home with home therapy and friend support during the day for brace assist and IADLs  Rationale for recommendations: Pt tolerating fair amount of ambulation currently and transferring with A x 1, likely able to meet IP PT goals. HHPT indicated as it would be a taxing effort for the pt to leave the home for OPPT, limiting her ability to participate in therapies.        Entered by: Rina Champagne 09/13/2019 9:16 AM

## 2019-09-13 NOTE — PROGRESS NOTES
09/13/19 0836   Quick Adds   Type of Visit Initial PT Evaluation   Living Environment   Lives With spouse   Living Arrangements mobile home   Home Accessibility stairs to enter home   Number of Stairs, Main Entrance 4   Stair Railings, Main Entrance railing on right side (ascending)   Transportation Anticipated family or friend will provide   Living Environment Comment  does grocery shopping.  is gone during the day. Daughter in the area, but also works full time. Lives in trailer home. Also has the possiblity of living with a friend in the area.    Self-Care   Usual Activity Tolerance moderate   Current Activity Tolerance fair   Equipment Currently Used at Home cane, straight;walker, rolling;other (see comments);raised toilet  (Has 4WW)   Functional Level Prior   Ambulation 1-->assistive equipment   Transferring 1-->assistive equipment   Toileting 1-->assistive equipment   Bathing 2-->assistive person   Communication 0-->understands/communicates without difficulty   Swallowing 0-->swallows foods/liquids without difficulty   Cognition 0 - no cognition issues reported   Fall history within last six months yes   Number of times patient has fallen within last six months 10  (due to back pain/legs giving out)   Prior Functional Level Comment  helps with ADLs/IADLs depending on level of pain.    General Information   Onset of Illness/Injury or Date of Surgery - Date 09/12/19   Referring Physician HORTENCIA Ryan   Patient/Family Goals Statement Return home   Pertinent History of Current Problem (include personal factors and/or comorbidities that impact the POC)  Pt here status post L1-L3 Transforaminal or Oblique Interbody Fusion, T11-L4 Posteriolateral Fusion and Revision Instrumentation, T11-S1 Infuse. Pt has PMH of HTN, COPD, PTSD, and sz.    Precautions/Limitations fall precautions;spinal precautions   General Info Comments Pt has TLSO in room for OOB   Cognitive Status Examination   Orientation  "orientation to person, place and time   Level of Consciousness alert   Follows Commands and Answers Questions able to follow single-step instructions   Personal Safety and Judgment intact   Memory intact   Pain Assessment   Patient Currently in Pain Yes, see Vital Sign flowsheet  (7/10)   Range of Motion (ROM)   ROM Comment All joints WFLs.   Strength   Strength Comments Weakness noted in B LE. R hip flexion/DF 3+/5. R and L PF and knee extension 4/5.    Sensory Examination   Sensory Perception Comments Denies numbness currently but reports significant numbness in B UE and LE prior to surgery.    General Therapy Interventions   Planned Therapy Interventions bed mobility training;gait training;transfer training   Clinical Impression   Criteria for Skilled Therapeutic Intervention yes, treatment indicated   PT Diagnosis Impaired mobility    Influenced by the following impairments Decreased strength and high pain levels. Spinal precautions   Functional limitations due to impairments Impaired bed mobility, transfers, and gait   Clinical Presentation Stable/Uncomplicated   Clinical Presentation Rationale medically stable   Clinical Decision Making (Complexity) Low complexity   Therapy Frequency 2x/day   Predicted Duration of Therapy Intervention (days/wks) 3 days   Anticipated Discharge Disposition Home with Home Therapy   Risk & Benefits of therapy have been explained Yes   Patient, Family & other staff in agreement with plan of care Yes   [facility] AM-PAC  \"6 Clicks\" V.2 Basic Mobility Inpatient Short Form   1. Turning from your back to your side while in a flat bed without using bedrails? 3 - A Little   2. Moving from lying on your back to sitting on the side of a flat bed without using bedrails? 3 - A Little   3. Moving to and from a bed to a chair (including a wheelchair)? 3 - A Little   4. Standing up from a chair using your arms (e.g., wheelchair, or bedside chair)? 3 - A Little   5. To walk in hospital " room? 3 - A Little   6. Climbing 3-5 steps with a railing? 3 - A Little   Basic Mobility Raw Score (Score out of 24.Lower scores equate to lower levels of function) 18   Total Evaluation Time   Total Evaluation Time (Minutes) 8

## 2019-09-14 ENCOUNTER — APPOINTMENT (OUTPATIENT)
Dept: OCCUPATIONAL THERAPY | Facility: CLINIC | Age: 59
DRG: 460 | End: 2019-09-14
Attending: ORTHOPAEDIC SURGERY
Payer: COMMERCIAL

## 2019-09-14 ENCOUNTER — APPOINTMENT (OUTPATIENT)
Dept: PHYSICAL THERAPY | Facility: CLINIC | Age: 59
DRG: 460 | End: 2019-09-14
Attending: ORTHOPAEDIC SURGERY
Payer: COMMERCIAL

## 2019-09-14 LAB
GLUCOSE SERPL-MCNC: 137 MG/DL (ref 70–99)
HGB BLD-MCNC: 9.6 G/DL (ref 11.7–15.7)

## 2019-09-14 PROCEDURE — 12000000 ZZH R&B MED SURG/OB

## 2019-09-14 PROCEDURE — 25000128 H RX IP 250 OP 636: Performed by: NURSE PRACTITIONER

## 2019-09-14 PROCEDURE — 97535 SELF CARE MNGMENT TRAINING: CPT | Mod: GO | Performed by: REHABILITATION PRACTITIONER

## 2019-09-14 PROCEDURE — 97116 GAIT TRAINING THERAPY: CPT | Mod: GP

## 2019-09-14 PROCEDURE — 85018 HEMOGLOBIN: CPT | Performed by: PHYSICIAN ASSISTANT

## 2019-09-14 PROCEDURE — 25000132 ZZH RX MED GY IP 250 OP 250 PS 637: Performed by: PHYSICIAN ASSISTANT

## 2019-09-14 PROCEDURE — 25000132 ZZH RX MED GY IP 250 OP 250 PS 637: Performed by: NURSE PRACTITIONER

## 2019-09-14 PROCEDURE — 99232 SBSQ HOSP IP/OBS MODERATE 35: CPT | Performed by: INTERNAL MEDICINE

## 2019-09-14 PROCEDURE — 25000132 ZZH RX MED GY IP 250 OP 250 PS 637: Performed by: INTERNAL MEDICINE

## 2019-09-14 PROCEDURE — 36415 COLL VENOUS BLD VENIPUNCTURE: CPT | Performed by: PHYSICIAN ASSISTANT

## 2019-09-14 PROCEDURE — 97530 THERAPEUTIC ACTIVITIES: CPT | Mod: GP

## 2019-09-14 PROCEDURE — 82947 ASSAY GLUCOSE BLOOD QUANT: CPT | Performed by: PHYSICIAN ASSISTANT

## 2019-09-14 PROCEDURE — 25000128 H RX IP 250 OP 636: Performed by: ORTHOPAEDIC SURGERY

## 2019-09-14 PROCEDURE — 97165 OT EVAL LOW COMPLEX 30 MIN: CPT | Mod: GO | Performed by: REHABILITATION PRACTITIONER

## 2019-09-14 RX ORDER — HYDROXYZINE PAMOATE 50 MG/1
50 CAPSULE ORAL 3 TIMES DAILY PRN
Qty: 20 CAPSULE | Refills: 0 | Status: SHIPPED | OUTPATIENT
Start: 2019-09-14 | End: 2020-07-22

## 2019-09-14 RX ORDER — CALCIUM CARBONATE 500 MG/1
1000 TABLET, CHEWABLE ORAL EVERY 4 HOURS PRN
Status: DISCONTINUED | OUTPATIENT
Start: 2019-09-14 | End: 2019-09-15 | Stop reason: HOSPADM

## 2019-09-14 RX ORDER — OXYCODONE AND ACETAMINOPHEN 10; 325 MG/1; MG/1
1 TABLET ORAL EVERY 6 HOURS PRN
Qty: 40 TABLET | Refills: 0 | Status: ON HOLD | OUTPATIENT
Start: 2019-09-14 | End: 2020-08-01

## 2019-09-14 RX ORDER — KETOROLAC TROMETHAMINE 30 MG/ML
30 INJECTION, SOLUTION INTRAMUSCULAR; INTRAVENOUS EVERY 6 HOURS
Status: DISCONTINUED | OUTPATIENT
Start: 2019-09-14 | End: 2019-09-15 | Stop reason: HOSPADM

## 2019-09-14 RX ADMIN — KETOROLAC TROMETHAMINE 30 MG: 30 INJECTION, SOLUTION INTRAMUSCULAR at 13:02

## 2019-09-14 RX ADMIN — SENNOSIDES AND DOCUSATE SODIUM 2 TABLET: 8.6; 5 TABLET ORAL at 20:52

## 2019-09-14 RX ADMIN — CALCIUM CARBONATE (ANTACID) CHEW TAB 500 MG 1000 MG: 500 CHEW TAB at 21:01

## 2019-09-14 RX ADMIN — RANITIDINE 150 MG: 150 TABLET ORAL at 08:08

## 2019-09-14 RX ADMIN — OXYCODONE HYDROCHLORIDE 15 MG: 5 TABLET ORAL at 02:08

## 2019-09-14 RX ADMIN — SENNOSIDES AND DOCUSATE SODIUM 2 TABLET: 8.6; 5 TABLET ORAL at 08:08

## 2019-09-14 RX ADMIN — DICLOFENAC 4 G: 10 GEL TOPICAL at 09:42

## 2019-09-14 RX ADMIN — CITALOPRAM HYDROBROMIDE 40 MG: 20 TABLET ORAL at 08:08

## 2019-09-14 RX ADMIN — OXYCODONE HYDROCHLORIDE 15 MG: 5 TABLET ORAL at 09:42

## 2019-09-14 RX ADMIN — LIDOCAINE 1 PATCH: 560 PATCH PERCUTANEOUS; TOPICAL; TRANSDERMAL at 20:52

## 2019-09-14 RX ADMIN — HYDROMORPHONE HYDROCHLORIDE 0.5 MG: 1 INJECTION, SOLUTION INTRAMUSCULAR; INTRAVENOUS; SUBCUTANEOUS at 06:10

## 2019-09-14 RX ADMIN — MELATONIN TAB 3 MG 3 MG: 3 TAB at 20:52

## 2019-09-14 RX ADMIN — KETOROLAC TROMETHAMINE 30 MG: 30 INJECTION, SOLUTION INTRAMUSCULAR at 18:50

## 2019-09-14 RX ADMIN — ACETAMINOPHEN 975 MG: 325 TABLET, FILM COATED ORAL at 05:11

## 2019-09-14 RX ADMIN — LEVETIRACETAM 500 MG: 500 TABLET, FILM COATED ORAL at 08:08

## 2019-09-14 RX ADMIN — OXYCODONE HYDROCHLORIDE 15 MG: 5 TABLET ORAL at 05:10

## 2019-09-14 RX ADMIN — DICLOFENAC 4 G: 10 GEL TOPICAL at 17:49

## 2019-09-14 RX ADMIN — DICLOFENAC 4 G: 10 GEL TOPICAL at 13:03

## 2019-09-14 RX ADMIN — RANITIDINE 150 MG: 150 TABLET ORAL at 20:52

## 2019-09-14 RX ADMIN — GABAPENTIN 600 MG: 300 CAPSULE ORAL at 08:09

## 2019-09-14 RX ADMIN — OXYCODONE HYDROCHLORIDE 15 MG: 5 TABLET ORAL at 20:09

## 2019-09-14 RX ADMIN — CYCLOBENZAPRINE HYDROCHLORIDE 10 MG: 10 TABLET, FILM COATED ORAL at 05:39

## 2019-09-14 RX ADMIN — LEVETIRACETAM 1000 MG: 500 TABLET, FILM COATED ORAL at 20:51

## 2019-09-14 RX ADMIN — ACETAMINOPHEN 975 MG: 325 TABLET, FILM COATED ORAL at 13:02

## 2019-09-14 RX ADMIN — OXYCODONE HYDROCHLORIDE 15 MG: 5 TABLET ORAL at 16:18

## 2019-09-14 RX ADMIN — ACETAMINOPHEN 975 MG: 325 TABLET, FILM COATED ORAL at 20:51

## 2019-09-14 RX ADMIN — HYDROXYZINE HYDROCHLORIDE 25 MG: 25 TABLET ORAL at 20:52

## 2019-09-14 RX ADMIN — GABAPENTIN 600 MG: 300 CAPSULE ORAL at 20:51

## 2019-09-14 ASSESSMENT — ACTIVITIES OF DAILY LIVING (ADL)
ADLS_ACUITY_SCORE: 20
ADLS_ACUITY_SCORE: 19
ADLS_ACUITY_SCORE: 19
ADLS_ACUITY_SCORE: 20
ADLS_ACUITY_SCORE: 19
ADLS_ACUITY_SCORE: 20
PREVIOUS_RESPONSIBILITIES: MEAL PREP;HOUSEKEEPING;MEDICATION MANAGEMENT

## 2019-09-14 NOTE — PLAN OF CARE
Pt A&O x4-forgetful & lethargic @ times. VS stable; afebrile. PO oxycodone and scheduled tylenol, lidocaine patches, Voltaren gel, and IV Toradol managing pain. CMS: baseline numbness and tingling in BUE's and BLE's. Dressing CDI-dressing where drain was changed d/t saturation; now CDI. Incision iced. Up w/ A1, using gait belt, walker, and brace. Voiding in good amts. Tolerating regular diet. Plan is for possible discharge to home tomorrow. Will continue to monitor.

## 2019-09-14 NOTE — PLAN OF CARE
VSS. Forgetful at times, not using call light to call for help. Bed alarm on and audible, encouraged to use call light.  Pain managed with oral medications. Received IV dilaudid 1 time this morning after drain removal. Will continue to monitor.

## 2019-09-14 NOTE — PROGRESS NOTES
09/14/19 0942   Quick Adds   Type of Visit Initial Occupational Therapy Evaluation   Living Environment   Lives With spouse   Living Arrangements mobile home   Home Accessibility stairs to enter home   Number of Stairs, Main Entrance 4   Stair Railings, Main Entrance railing on right side (ascending)   Transportation Anticipated family or friend will provide   Living Environment Comment Pt's  is gone during the day, but able to help before/after work.  Pt has 2 daughters that are both nurses, but work too.   Self-Care   Usual Activity Tolerance moderate   Current Activity Tolerance fair   Regular Exercise No   Equipment Currently Used at Home cane, straight;walker, rolling   Activity/Exercise/Self-Care Comment Pt reported to be independent in ADLs and light homemaking at baseline.  Uses a 4WW for mobility.   Functional Level   Ambulation 1-->assistive equipment   Transferring 1-->assistive equipment   Toileting 1-->assistive equipment   Bathing 2-->assistive person   Dressing 2-->assistive person   Eating 0-->independent   Communication 0-->understands/communicates without difficulty   Swallowing 0-->swallows foods/liquids without difficulty   Cognition 0 - no cognition issues reported   Fall history within last six months yes   Number of times patient has fallen within last six months 10   Which of the above functional risks had a recent onset or change? ambulation;transferring;bathing;cognition;fall history       Present no   General Information   Onset of Illness/Injury or Date of Surgery - Date 09/12/19   Referring Physician Josselin Ryan CNP   Patient/Family Goals Statement return to home   Additional Occupational Profile Info/Pertinent History of Current Problem Pt here status post L1-L3 Transforaminal or Oblique Interbody Fusion, T11-L4 Posteriolateral Fusion and Revision Instrumentation, T11-S1 Infuse. Pt has PMH of HTN, COPD, PTSD, and sz.    Precautions/Limitations spinal  precautions   General Observations TLSO   General Info Comments activity:  ambulate, up with assist   Cognitive Status Examination   Orientation person;place   Level of Consciousness alert   Follows Commands (Cognition) follows one step commands   Memory intact   Attention Sustained attention impaired;Distractible during evaluation   Organization/Problem Solving Sequencing impaired;Problem solving impaired   Executive Function Impulsive;Working memory impaired, decreased storage of information for performing tasks;Planning ability impaired;Self awareness/monitoring impaired   Visual Perception   Visual Perception Wears glasses   Sensory Examination   Sensory Quick Adds No deficits were identified   Pain Assessment   Patient Currently in Pain Yes, see Vital Sign flowsheet   Posture   Posture not impaired   Range of Motion (ROM)   ROM Quick Adds No deficits were identified   ROM Comment B UE AROM WFL   Strength   Manual Muscle Testing Quick Adds Other   Strength Comments B UE strength not formally tested secondary to spinal precautions, grossly 4/5 per observation.   Hand Strength   Hand Strength Comments WFL   Muscle Tone Assessment   Muscle Tone Quick Adds No deficits were identified   Coordination   Upper Extremity Coordination No deficits were identified   Mobility   Bed Mobility Comments SBA with cueing for log roll technique   Transfer Skill: Bed to Chair/Chair to Bed   Level of [place]: Bed to Chair contact guard   Physical Assist/Nonphysical Assist: Bed to Chair verbal cues   Weight-Bearing Restrictions weight-bearing as tolerated   Assistive Device - Transfer Skill Bed to Chair Chair to Bed Rehab Eval rolling walker   Transfer Skill: Sit to Stand   Level of [place]: Sit/Stand contact guard   Physical Assist/Nonphysical Assist: Sit/Stand verbal cues   Transfer Skill: Sit to Stand weight-bearing as tolerated   Assistive Device for Transfer: Sit/Stand rolling walker   Transfer Skill: Toilet Transfer    Level of [place]: Toilet contact guard   Physical Assist/Nonphysical Assist: Toilet verbal cues   Weight-Bearing Restrictions: Toilet weight-bearing as tolerated   Assistive Device rolling walker;grab bars   Upper Body Dressing   Level of [place]: Dress Upper Body maximum assist (25% patients effort)   Physical Assist/Nonphysical Assist: Dress Upper Body set-up required;verbal cues   Assistive Device other (see comments)  (including TLSO)   Lower Body Dressing   Level of [place]: Dress Lower Body moderate assist (50% patients effort)   Physical Assist/Nonphysical Assist: Dress Lower Body set-up required;verbal cues   Assistive Device reacher   Toileting   Level of [place]: Toilet minimum assist (75% patients effort)   Physical Assist/Nonphysical Assist: Toilet set-up required;verbal cues   Assistive Device grab bars   Grooming   Level of [place]: Grooming contact guard   Physical Assist/Nonphysical Assist: Grooming set-up required;verbal cues   Eating/Self Feeding   Level of [place]: Eating stand-by assist   Physical Assist/Nonphysical Assist: Eating set-up required   Instrumental Activities of Daily Living (IADL)   Previous Responsibilities meal prep;housekeeping;medication management   Activities of Daily Living Analysis   Impairments Contributing to Impaired Activities of Daily Living balance impaired;cognition impaired;pain;post surgical precautions   General Therapy Interventions   Planned Therapy Interventions ADL retraining;transfer training   Clinical Impression   Criteria for Skilled Therapeutic Interventions Met yes, treatment indicated   OT Diagnosis decreased ADL performance   Influenced by the following impairments pain, cognition, spinal precautions   Assessment of Occupational Performance 3-5 Performance Deficits   Identified Performance Deficits Pt with decreased ability to manage dressing, bathing, toileting, cooking, light homemaking   Clinical Decision Making  "(Complexity) Low complexity   Therapy Frequency Daily   Predicted Duration of Therapy Intervention (days/wks) 2 days   Anticipated Equipment Needs at Discharge dressing equipment;shower chair   Anticipated Discharge Disposition Home with Assist   Risks and Benefits of Treatment have been explained. Yes   Patient, Family & other staff in agreement with plan of care Yes   [facility] TM \"6 Clicks\"   2016, Trustees of [facility], under license to [employer].  All rights reserved.   6 Clicks Short Forms Daily Activity Inpatient Short Form   [facility]  \"6 Clicks\" Daily Activity Inpatient Short Form   1. Putting on and taking off regular lower body clothing? 2 - A Lot   2. Bathing (including washing, rinsing, drying)? 2 - A Lot   3. Toileting, which includes using toilet, bedpan or urinal? 3 - A Little   4. Putting on and taking off regular upper body clothing? 2 - A Lot   5. Taking care of personal grooming such as brushing teeth? 3 - A Little   6. Eating meals? 4 - None   Daily Activity Raw Score (Score out of 24.Lower scores equate to lower levels of function) 16   Total Evaluation Time   Total Evaluation Time (Minutes) 8     "

## 2019-09-14 NOTE — PROGRESS NOTES
[facility]  Hospitalist Consult Progress Note  Fabien Orozco MD 09/14/19    Reason for Stay (Diagnosis): Spine hardware removal and fusion         Assessment and Plan:      Summary of Stay:   Josselin Cunha is a 59 year old female with history of chronic back pain and previous L3- L5 hardware placement, HTN, seizure disorder, Crohn's disease, PTSD, and asthma who was admitted on 9/12/2019 following removal of previous L3-L5 hardware, expiration of spinal fusion, repeat fusion.  Pain control and issue following surgery.  Pain management team consulted.  Blood pressure soft despite holding PT blood pressure medications likely secondary to opiates.  Mild drop in hemoglobin, stable on repeat check.  PT and OT consulted.    Problem List/Assessment and Plan:   S/p Removal of prior instrumentation, L3, L4, L5.  Exploration of spinal fusion.  Repeat posterolateral fusion at L3-L4.  4.  Posterior fusion, T11-T12, L1, L2, L3.  5.  Segmental instrumentation T11-12, L2, L2, L3 L4, L5 and sacrum using iliac screws: Hemodynamically stable with holding her PTA blood pressure meds.  Pain control continues to be an issue for the patient although she looks mildly sedated and slurred speech on oxycodone currently.  Being evaluated by PT and OT for disposition needs.  Defer pain management and DVT prophylaxis to primary team.  Hemoglobin down slightly, but stable postoperatively.    HTN: PTA on lisinopril 10 mg daily and HCTZ 25 mg daily that have been ordered, but not given due to low blood pressure not meeting hold parameters.  Blood pressure soft likely due to opiates.  Will make formal recommendations on day of discharge, but anticipate she will hold lisinopril and HCTZ on discharge until follow-up with primary care provider.    Seizure disorder: Resumed PTA Keppra.    Crohn's disease: No sign of acute exacerbation.    PTSD: Resumed sertraline 50 mg daily.    Asthma: PTA uses albuterol inhaler as needed.  No sign  "of acute exacerbation.    Acute blood loss anemia: Preop hemoglobin 11.9 down to 9's postoperatively and stable on repeat checks.    DVT Prophylaxis: Defer to primary service  Code Status: Full Code  FEN: regular diet  Discharge Dispo: per therapy teams, home with home care  Estimated Disch Date / # of Days until Disch: per primary team, likely tomorrow        Interval History (Subjective):      Mildly slurred speech on oxycodone.  Does not appear confused.  Says she had pain last night and was frustrated she did not get any IV Dilaudid for refractory pain.  No nausea or vomiting and tolerating p.o.  Denies shortness of breath.                  Physical Exam:      Last Vital Signs:  /70 (BP Location: Right arm)   Pulse 91   Temp 98.2  F (36.8  C) (Oral)   Resp 12   Ht 1.6 m (5' 3\")   Wt 88.9 kg (196 lb)   SpO2 99%   BMI 34.72 kg/m        Intake/Output Summary (Last 24 hours) at 9/14/2019 1429  Last data filed at 9/14/2019 0946  Gross per 24 hour   Intake 920 ml   Output 970 ml   Net -50 ml       Constitutional: Awake, NAD   Eyes: sclera white   HEENT: MMM  Respiratory: lungs cta bilaterally, no crackles or wheeze  Cardiovascular: RRR.  No murmur  GI: non-tender, not distended, bowel sounds present  Skin: no rash    Musculoskeletal/extremities: Trace bilateral lower extremity edema  Neurologic: Alert, mildly slurred speech, moves extremities equally, light touch sensation intact peripherally  Psychiatric: calm, cooperative          Medications:      All current medications were reviewed with changes reflected in problem list.         Data:      All new lab and imaging data was reviewed.   Labs:  Recent Labs   Lab 09/14/19 0758 09/13/19  0724   NA  --  140   POTASSIUM  --  4.4   CHLORIDE  --  107   CO2  --  30   ANIONGAP  --  2*   * 122*   BUN  --  18   CR  --  0.54   GFRESTIMATED  --  >90   GFRESTBLACK  --  >90   CHARLES  --  8.0*     Recent Labs   Lab 09/14/19 0758 09/13/19  0724 09/12/19  0656 "   HGB 9.6* 9.2* 11.9      Imaging:   None today      Fabien Orozco MD

## 2019-09-14 NOTE — PLAN OF CARE
Discharge Planner PT   Patient plan for discharge: Patient reports being unsure of discharge plan.  Patient reports possibly home, to a friend's house or to TCU.  Current status: Patient supine upon arrival.  Transferred to sitting at EOB through log roll independently.  Patient donned TLSO with verbal cueing for technique, intermittent hand over hand assist (this took 10 min to don brace).  Patient performed transfers between sitting and standing with 2WW and SBA.  Patient amb 200 feet with 2WW and SBA.  Patient negotiated 4 steps x2 with single railing and cane with SBA.  Barriers to return to prior living situation: falls risk  Recommendations for discharge: home with Home RN/PT/OT/HHA, assist from family member during stair negotiation  Rationale for recommendations: Patient demonstrating progress with mobility, requires assist for cueing for safe technique by family member during stair negotiation.  Patient requires home PT at this time as attending PT in a clinic setting would be a considerable and significantly taxing effort, limiting his ability to participate in therapy session.           Entered by: Arabella Pérez 09/14/2019 3:07 PM

## 2019-09-14 NOTE — PLAN OF CARE
"Discharge Planner OT   Patient plan for discharge: home with assist of Family  Current status: OT eval complete and treatment initiated.  Pt here status post L1-L3 Transforaminal or Oblique Interbody Fusion, T11-L4 Posteriolateral Fusion and Revision Instrumentation, T11-S1 Infuse. Pt has PMH of HTN, COPD, PTSD, and sz.  OT provided education in spinal precautions and possible AE/DME needs to include long handled reacher and shower chair.  Pt able to complete LE dressing with mod A using reacher.  UE dressing with max A to don TLSO.  Sit<>stand with CGA for functional mobility during ADLs with CGA using FWW.  OT provided education in safe toilet and shower transfers.  Pt now able to complete both with CGA and step by step cueing for safety, sequencing and proper hand placement.  Pt back to bed following ADLs with CGA to transfer to sitting EOB and SBA for log roll technique to return to supine.  Sister arrived at end of session and reported Pt's long history of self medicating and that she appears \"groggy 5-6 days a week.\"   Barriers to return to prior living situation: pain, precautions, limited Family support, stairs, tub/shower  Recommendations for discharge: home with assist, reacher and shower chair  Rationale for recommendations: Pt would benefit from continued OT to maximize safety and independence in ADLs and functional transfers with AE/DME.       Entered by: Yuki Ruelas 09/14/2019 10:02 AM       "

## 2019-09-14 NOTE — PLAN OF CARE
"  Discharge Planner PT   Patient plan for discharge: Patient stating that she would be going to TCU if her \"insurance said it was ok\" and asked if she needed to call her insurance company.  Patient also said that she might discharge to her friend's home or home with .  Current status: Patient seated in bedside chair upon arrival.  Patient required cueing for tightening TLSO.  Patient performed transfers between sitting and standing with 2WW and direct cueing for correct technique (patient demonstrated minimal carry over for technique between repetitions even with constant cueing).  Patient amb 300 feet with 2WW and SBA.  Patient negotiated 4 steps x3 with single railing and cane.  Required constant, direct cueing for safe technique with no demonstration of carry over between trials.  Barriers to return to prior living situation: falls risk  Recommendations for discharge: home with Home RN/PT/OT/HHA, assist from family member during stair negotiation  Rationale for recommendations: Patient demonstrating progress with mobility, requires assist for cueing for safe technique by family member during stair negotiation.  Patient requires home PT at this time as attending PT in a clinic setting would be a considerable and significantly taxing effort, limiting his ability to participate in therapy session.           Entered by: Arabella Pérez 09/14/2019 8:57 AM       "

## 2019-09-14 NOTE — DISCHARGE SUMMARY
This patient was admitted for following surgery for following reasons:    Procedure Date: 09/12/2019      PREOPERATIVE DIAGNOSIS:  Failed fusion L3-L4, stenosis L1-L2, L2-L3 thoracolumbar kyphosis.      POSTOPERATIVE DIAGNOSIS:  Failed fusion L3-L4, stenosis L1-L2, L2-L3 thoracolumbar kyphosis.      SURGEON:  Christian Love MD.      FIRST ASSISTANT:  Yuri Dunlap PA-C.  First assistant was necessary for tissue retraction, patient positioning, wound closure and wound suction.      PROCEDURES PERFORMED:   1.  Removal of prior instrumentation, L3, L4, L5.   2.  Exploration of spinal fusion.   3.  Repeat posterolateral fusion at L3-L4.   4.  Posterior fusion, T11-T12, L1, L2, L3.   5.  Segmental instrumentation T11-12, L2, L2, L3 L4, L5 and sacrum using iliac screws.   6.  Infuse medium dose kit supplemented with 10 mL of DBX, DBM and local morselized bone graft.      OPERATING TIME:  6 hours.      ESTIMATED BLOOD LOSS:  800 mL.      COMPLICATIONS:  None.      Intraoperative SSEP monitoring used throughout the case.  Signals remained stable throughout the case.      INDICATIONS:  The patient is a 59-year-old woman with a prior fusion at the L3-L4.  Unfortunately, this resulted in failed fusion with severe incapacitating back and leg pain.  She also had some stenosis at the level above at L2-3 and L1-2 and also had a thoracolumbar disk degeneration with some kyphosis with pain centered at that area as well.  Given this situation, aforementioned procedure was recommended with fusion from T11 through L4.  The nature of surgery, risks, and alternatives were discussed.  The patient opted to proceed.      DESCRIPTION OF PROCEDURE:  The patient was brought to the operating theater.  General endotracheal anesthesia was induced in an uneventful manner.  A timeout was called.  Prophylactic antibiotic was given, SCDs applied.  The patient was hooked up for intraoperative SSEP monitoring.  The hips were extended to prevent flat  back syndrome.  Four poster Gilberto OSI frame was used.  After routine prep and draped using DuraPrep, midline longitudinal incision was then made approximately from T10 through sacrum.  Skin was incised.  Subcutaneous tissue was incised.  Paraspinal muscles were subperiosteally elevated and previously inserted instrumentation was noted.  L3-L4 screws were removed.  L3 pedicle screw was especially loose, corresponding to preoperative CT imaging study, corresponding to the patient's severe right-sided leg pain preoperatively.  Right L5 pedicle screw was also removed and this was noted that there was a fracture in the mid portion of the pedicle screws.  Therefore, decision was made to go down to the sacrum and bilateral iliac screws inserted.  These were S2 iliac screws going through the lateral wall of the dorsal S1 foramen and using [facility]oscopic guidance[facility] was inserted in appropriate fashion using pelvic inlet/outlet views and the final screws used were 8.5 mm diameter screws, 80 mm in length and this traversed the SI joint and provided excellent fixation of the sacrum and ilium.  The L3-L4 screws were replaced with 1 mm larger diameter and 5 mm longer screws and I had excellent purchase with new L3-L4 pedicle screws.  The L5 pedicle screw was skipped.  Then, pedicle screws inserted into the pedicles of T11-T12, L1-L2 using intermittent intraoperative C-arm guidance, mostly AP images.  After insertion of the screws at T11, T12, L1, L2, L3, L4, and iliac screws bilaterally, images were taken and the images showed excellent positioning of the pedicle screws and iliac screws.  The thoracic pedicle screws were 6.5 mm in diameter, 50 mm in length and the lumbar screws were 6.5 mm in diameter, 55-60 mm in length.  The L3-L4 pedicle screws were 8.5 mm in diameter, replacing previously inserted 7.5 mm pedicle screws at both levels.  There was gross motion across the site of prior fusion at L3-L4  consistent with the patient's preoperative severe incapacitating symptoms with right lower extremity radiation.      Initially transforaminal interbody fusion was to be done; however, this was skipped as most obvious pathology was the failed fusion at the L3-L4 at the previous site of fusion and the thoracolumbar kyphosis.  After the instrumentation was applied, decortication using Capener gouge was used to decorticate the facets and lamina from T11 through L4 throughout over the decorticated areas.  Infuse medium dose kit supplemented with 10 mL of DBX DBM supplemented with morcellized local autologous bone graft.  It was packed as constituting posterior fusion from T11 through L4.  I felt the laminectomy was not needed at the L2-3, L3-4 as patient's symptoms referable to the nonunion at L3-L4 and preoperative MRI and CT showed a very mild severe stenosis at this level, stabilizing those levels.  Should she have a good outcome, laminectomy would also further take away from the surface area available for bone grafting.  Titanium rods were contoured and cut into appropriate shape and length and were engaged into the reduction type of pedicle screws from T11 through L4 throughout.  Nuts were tightened down using counter torque wrench.  Good secure fixation was achieved.  The wound was irrigated with antibiotic saturated solution, 2 grams of vancomycin powder sprayed into the wound prior to the closure.  Deep fascial tissue was reapproximated using #1 Vicryl suture.  Subcutaneous tissue was reapproximated using 2-0 Vicryl sutures.  One Hemovac line was brought out from subcutaneous tissue and skin was reapproximated using skin staples.  Aquacel dressing was applied.  The patient was turned into supine position.  The patient was extubated and patient was taken to the recovery room in good condition.  The patient tolerated the procedure well.         LUDY PERKINS MD          Hospital Course:    Postop she did well.   Preop severe low back and right leg pain went away.  Neuro intact.  Ambulating well.  Pain under good control with oxycodone.  Was sent home POD #3 with percocet 10mg 40 tabs and vistaril 50mg 20 tabs prn for postop pain control.  Further followup at [facility] within two weeks for staple removal and further followup.

## 2019-09-14 NOTE — PROGRESS NOTES
Vitals stable.  Hgb 9.6.  Neuro intact.  No leg pain.  Ambulated in the hallway.   Pain under good control with oxycodone.  Home tomorrow.  She has help at home and also has a walker.   Rx a few more doses of toradol.

## 2019-09-14 NOTE — CONSULTS
Care Transition Initial Assessment - SW     Met with: Patient  Active Problems:    Drug overdose    PTSD (post-traumatic stress disorder)    Spinal stenosis of thoracolumbar region       DATA  Lives With: spouse and daughter  Living Arrangements: mobile home    ASSESSMENT  SW met with patient at bedside for assessment of discharge planning needs. Patient is alert and oriented X3, conversant, and able to participate in discharge planning.     SW discussed with patient the need for home care services after discharge. Patient plans to stay with daughter that lives near her home. Patient notes intending to do Home PT in her daughters home. The patient was given a list of home care agencies. Patient selected [facility]. The criteria for home bound status was explained. Home care was described to patient as a skilled, intermittent service. Length and frequency of home care visits will be determined by the home care agency.      PLAN  Patient will discharge home with [facility] ([phone], [phone]) when ready for discharge.    Social Work will continue to follow patient and remain available to assist with discharge planning.     Desire Hernández, [facility]  [phone]

## 2019-09-15 ENCOUNTER — APPOINTMENT (OUTPATIENT)
Dept: PHYSICAL THERAPY | Facility: CLINIC | Age: 59
DRG: 460 | End: 2019-09-15
Attending: ORTHOPAEDIC SURGERY
Payer: COMMERCIAL

## 2019-09-15 ENCOUNTER — APPOINTMENT (OUTPATIENT)
Dept: OCCUPATIONAL THERAPY | Facility: CLINIC | Age: 59
DRG: 460 | End: 2019-09-15
Attending: ORTHOPAEDIC SURGERY
Payer: COMMERCIAL

## 2019-09-15 VITALS
WEIGHT: 196 LBS | RESPIRATION RATE: 14 BRPM | SYSTOLIC BLOOD PRESSURE: 122 MMHG | OXYGEN SATURATION: 95 % | BODY MASS INDEX: 34.73 KG/M2 | HEIGHT: 63 IN | TEMPERATURE: 98 F | HEART RATE: 78 BPM | DIASTOLIC BLOOD PRESSURE: 80 MMHG

## 2019-09-15 PROCEDURE — 99232 SBSQ HOSP IP/OBS MODERATE 35: CPT | Performed by: INTERNAL MEDICINE

## 2019-09-15 PROCEDURE — 25000132 ZZH RX MED GY IP 250 OP 250 PS 637: Performed by: INTERNAL MEDICINE

## 2019-09-15 PROCEDURE — 25000128 H RX IP 250 OP 636: Performed by: ORTHOPAEDIC SURGERY

## 2019-09-15 PROCEDURE — 25000132 ZZH RX MED GY IP 250 OP 250 PS 637: Performed by: NURSE PRACTITIONER

## 2019-09-15 PROCEDURE — 97116 GAIT TRAINING THERAPY: CPT | Mod: GP

## 2019-09-15 PROCEDURE — 97535 SELF CARE MNGMENT TRAINING: CPT | Mod: GO | Performed by: REHABILITATION PRACTITIONER

## 2019-09-15 PROCEDURE — 97530 THERAPEUTIC ACTIVITIES: CPT | Mod: GP

## 2019-09-15 PROCEDURE — 25000132 ZZH RX MED GY IP 250 OP 250 PS 637: Performed by: PHYSICIAN ASSISTANT

## 2019-09-15 PROCEDURE — 99207 ZZC CDG-MDM COMPONENT: MEETS MODERATE - UP CODED: CPT | Performed by: INTERNAL MEDICINE

## 2019-09-15 RX ORDER — CITALOPRAM HYDROBROMIDE 20 MG/1
20 TABLET ORAL DAILY
COMMUNITY

## 2019-09-15 RX ORDER — LISINOPRIL 20 MG/1
20 TABLET ORAL DAILY
Status: ON HOLD | COMMUNITY
End: 2019-09-15

## 2019-09-15 RX ADMIN — LEVETIRACETAM 500 MG: 500 TABLET, FILM COATED ORAL at 08:45

## 2019-09-15 RX ADMIN — CALCIUM CARBONATE (ANTACID) CHEW TAB 500 MG 1000 MG: 500 CHEW TAB at 12:36

## 2019-09-15 RX ADMIN — RANITIDINE 150 MG: 150 TABLET ORAL at 08:45

## 2019-09-15 RX ADMIN — DICLOFENAC 4 G: 10 GEL TOPICAL at 08:46

## 2019-09-15 RX ADMIN — ACETAMINOPHEN 975 MG: 325 TABLET, FILM COATED ORAL at 12:36

## 2019-09-15 RX ADMIN — KETOROLAC TROMETHAMINE 30 MG: 30 INJECTION, SOLUTION INTRAMUSCULAR at 05:55

## 2019-09-15 RX ADMIN — GABAPENTIN 600 MG: 300 CAPSULE ORAL at 08:45

## 2019-09-15 RX ADMIN — ACETAMINOPHEN 975 MG: 325 TABLET, FILM COATED ORAL at 05:57

## 2019-09-15 RX ADMIN — OXYCODONE HYDROCHLORIDE 15 MG: 5 TABLET ORAL at 06:42

## 2019-09-15 RX ADMIN — OXYCODONE HYDROCHLORIDE 10 MG: 5 TABLET ORAL at 15:06

## 2019-09-15 RX ADMIN — KETOROLAC TROMETHAMINE 30 MG: 30 INJECTION, SOLUTION INTRAMUSCULAR at 00:29

## 2019-09-15 RX ADMIN — CITALOPRAM HYDROBROMIDE 40 MG: 20 TABLET ORAL at 08:45

## 2019-09-15 RX ADMIN — SENNOSIDES AND DOCUSATE SODIUM 2 TABLET: 8.6; 5 TABLET ORAL at 08:44

## 2019-09-15 RX ADMIN — CYCLOBENZAPRINE HYDROCHLORIDE 10 MG: 10 TABLET, FILM COATED ORAL at 01:46

## 2019-09-15 RX ADMIN — KETOROLAC TROMETHAMINE 30 MG: 30 INJECTION, SOLUTION INTRAMUSCULAR at 11:52

## 2019-09-15 RX ADMIN — OXYCODONE HYDROCHLORIDE 15 MG: 5 TABLET ORAL at 00:28

## 2019-09-15 RX ADMIN — OXYCODONE HYDROCHLORIDE 15 MG: 5 TABLET ORAL at 11:52

## 2019-09-15 ASSESSMENT — ACTIVITIES OF DAILY LIVING (ADL)
ADLS_ACUITY_SCORE: 19

## 2019-09-15 NOTE — PROGRESS NOTES
[facility]  Hospitalist Consult Progress Note  Fabien Orozco MD 09/15/19    Reason for Stay (Diagnosis): Spine hardware removal and fusion         Assessment and Plan:      Summary of Stay:   Josselin Cunha is a 59 year old female with history of chronic back pain and previous L3- L5 hardware placement, HTN, seizure disorder, Crohn's disease, PTSD, and asthma who was admitted on 9/12/2019 following removal of previous L3-L5 hardware, expiration of spinal fusion, repeat fusion.  Pain control and issue following surgery.  Pain management team consulted.  Blood pressure soft despite holding PT blood pressure medications likely secondary to opiates.  Mild drop in hemoglobin, stable on repeat check.  PT and OT consulted.  Home care recommended.    Problem List/Assessment and Plan:   S/p Removal of prior instrumentation, L3, L4, L5.  Exploration of spinal fusion.  Repeat posterolateral fusion at L3-L4.  4.  Posterior fusion, T11-T12, L1, L2, L3.  5.  Segmental instrumentation T11-12, L2, L2, L3 L4, L5 and sacrum using iliac screws: Hemodynamically stable with holding her PTA blood pressure meds.  Pain control continues to be an issue for the patient although she looks mildly sedated and slurred speech on oxycodone currently.  Being evaluated by PT and OT for disposition needs.  Defer pain management and DVT prophylaxis to primary team.  Hemoglobin down slightly, but stable postoperatively.    HTN: PTA on lisinopril 10 mg daily and HCTZ 25 mg daily that have been ordered, but not given due to low blood pressure not meeting hold parameters.  Blood pressure low likely due to opiates.  Discussed with patient that she will continue to hold lisinopril and HCTZ on discharge until follow-up with primary care provider in 7 to 10 days.    Seizure disorder: Resumed PTA Keppra.    Crohn's disease: No sign of acute exacerbation.    PTSD: Initial med rec incorrect per pharmacist who is now going to interview the  "patient and determine what she was on previously.  She will resume her previous medications except holding her lisinopril and HCTZ as above.    Asthma: PTA uses albuterol inhaler as needed.  No sign of acute exacerbation.    Acute blood loss anemia: Preop hemoglobin 11.9 down to 9's postoperatively and stable on repeat checks.    DVT Prophylaxis: Defer to primary service  Code Status: Full Code  FEN: regular diet  Discharge Dispo: per therapy teams, home with home care  Estimated Disch Date / # of Days until Disch: Likely today per primary team.  Pharm.D. reviewing patient's initial med rec and other than pain medications I will complete the discharge med orders        Interval History (Subjective):      Continues to request scheduled oxycodone around-the-clock.  Mild slurred speech persists.  No respiratory depression noted.  Reports persistent pain, but overall tolerable when she gets her medications.  Denies any lightheadedness this morning.  No shortness of breath.                  Physical Exam:      Last Vital Signs:  /80 (BP Location: Right arm)   Pulse 78   Temp 98  F (36.7  C) (Temporal)   Resp 14   Ht 1.6 m (5' 3\")   Wt 88.9 kg (196 lb)   SpO2 95%   BMI 34.72 kg/m      Intake/Output Summary (Last 24 hours) at 9/15/2019 1107  Last data filed at 9/15/2019 0843  Gross per 24 hour   Intake 1380 ml   Output --   Net 1380 ml       Constitutional: Awake, NAD   Eyes: sclera white, slightly glazed over eyes  HEENT: MMM  Respiratory: no crackles or wheeze  Cardiovascular: RRR.  No murmur  GI: non-tender, not distended, bowel sounds present  Skin: no rash    Musculoskeletal/extremities: Trace bilateral lower extremity edema  Neurologic: Alert, mildly slurred speech, moves extremities equally, light touch sensation intact peripherally  Psychiatric: calm, cooperative          Medications:      All current medications were reviewed with changes reflected in problem list.         Data:      No new labs or " imaging today      Fabien Orozco MD

## 2019-09-15 NOTE — PLAN OF CARE
Pt A&O x4-forgetful @ times. VS stable; afebrile. PO oxycodone and scheduled tylenol, Voltaren gel, lidocaine patch, and IV Toradol managing pain. CMS: baseline numbness and tingling in BLE's and BUE's. Dressing CDI-incision iced. Up w/ SBA, using gait belt, walker, and brace. Voiding in good amts. Tolerating regular diet. Plan is to discharge to home this evening. Will continue to monitor.     Reviewed discharge instructions and medications with patient. Questions answered.

## 2019-09-15 NOTE — DISCHARGE INSTRUCTIONS
Care instructions after leaving the hospital       After Care Instructions     Activity    Discharge instructions:  No lifting of more than 10 pounds, no bending, no twisting until follow up visit in 6 weeks.    Wear brace when out of bed.    Ok to shampoo hair, shower or bathe, but, do not scrub or submerge incision under water until evaluated post-operatively in clinic.    Ok to walk as tolerated, avoid bed rest and prolonged sitting.    No contact sports until after follow up visit  No high impact activities such as; running/jogging, snowmobile or 4 araya riding or any other recreational vehicles.    Do not take NSAIDS (ibuprofen, advil, aleve, naproxen, etc) for pain control.    Do NOT drive while taking narcotic pain medication.    Call Dr. Love's office for increasing redness, swelling or pus draining from the incision, increased pain or any other questions and concerns.           Diet    Follow this diet upon discharge: Orders Placed This Encounter  Advance Diet as Tolerated: Regular Diet Adult           Wound care and dressings    Keep your incision clean and dry at all times. Keep Aquacel dressing in place until follow up appointment. OK to shower with dressing on. Call immediately or come to ED if any drainage occurs, or you develop new pain, redness, or swelling.                     The  has arranged home care,  for you after you return home from the hospital.   The Home Care Agency Arranged is [facility]  The telephone number is [phone]  You will be contacted by phone within a few days after your hospital stay by the Home Care Agency to set up your first visit.     If you have not heard from the Home Care Agency within 1-2  Days after discharge from the hospital, please contact the number provided above.

## 2019-09-15 NOTE — PLAN OF CARE
Pt given discharge meds and home health care packet. All questions answered. Pt was given given discharge instructions earlier in day and verbalized understanding. Escorted down to car via wheelchair with belongings @1600.

## 2019-09-15 NOTE — PLAN OF CARE
A&OX4, VSS: stable. Pt. becomes lethargic slurred speech after PRN Oxycodone given, however requesting pain meds around the clock, getting upset if not given. Educated patient other options for pain control but refused. Reinforced dressing: CDI. Up with Ax1, brace and gbelt. Voiding good amounts. Nursing continue to monitor.

## 2019-09-15 NOTE — PLAN OF CARE
Discharge Planner OT   Patient plan for discharge: home with assist of Family and Friends  Current status: Pt in [facility] with PT and transitioned directly to OT.  She was seen in [facility] for education in toilet and tub/shower transfer safety.  Pt now able to transfer to toilet with SBA using FWW and RTS with handrails.  Tub transfer with SBA using grab bars and chair.  Pt reported to already have RTS in place at home and is able to borrow a shower chair from a friend.  OT then provided instruction/demonstration in dressing technique with possible need for AE.  Pt now able to cross legs to bring feet up to don socks/shoes and initiate pants over feet without need for AE. OT reviewed importance of making home safety modifications to remove throw rugs/tripping hazards and to move frequently used items to within arms reach to maintain spinal precautions.  Barriers to return to prior living situation: stairs, minimal support of , cognition and falls risk  Recommendations for discharge: home with Home RN/PT/OT/HHA,  Supervision from family member during stair negotiation and bathing with Family doing all cooking, homemaking and driving  Rationale for recommendations: Pt now able to complete ADLs and toilet/tub transfers with SBA.  Pt reported ability to borrow reacher and shower chair from a friend with no further OT or AE/DME needs identified.  Pt would benefit from home OT safety eval to assess DME needs and IADLs.    Occupational Therapy Discharge Summary    Reason for therapy discharge:    Discharged to home with home therapy.    Progress towards therapy goal(s). See goals on Care Plan in [facility] electronic health record for goal details.  Goals partially met.  Barriers to achieving goals:   discharge from facility.    Therapy recommendation(s):    Continued therapy is recommended.  Rationale/Recommendations:  Pt would benefit from continued OT to maximize safety and independence in ADLs/IADLs while  maintaining spinal precautions with AE/DME as needed.            Entered by: Yuki Ruelas 09/15/2019 1:05 PM

## 2019-09-15 NOTE — PLAN OF CARE
Pt A&O, lethargic at times. VSS. Lungs clear, IS encouarged. Tolerating a regular diet, + bs. Voiding adequately. N/T to RLE. New dressing applied as old one was peeling up. Drain site still draining sanguinous fluid. Pt restless/impulsive for most of shift. Falls asleep easily, but easy to arouse. Up with A1 brace, walker, and gait belt. Ambulated in mendoza. Given oxy, vistaril, and IV toradol. Encouraging use of ice, repositioning, deep breathing and aromatherapy in addition to pain medications. Plan for home at discharge.

## 2019-09-15 NOTE — PLAN OF CARE
Discharge Planner PT   Patient plan for discharge: Patient reports being unsure of discharge plan.  Patient reports possibly home, to a friend's house or to TCU.  Current status: Patient is independent with bed mobility; demonstrates adherence to spinal precautions with log roll.  Patient donned TLSO with minimal verbal cueing for technique; much improved from yesterday.  Patient performed transfers between sitting and standing with 2WW and SBA.  Patient amb 200 feet with 2WW and SBA.  Patient negotiated 4 steps x2 with single railing and cane with SBA.  Barriers to return to prior living situation: falls risk  Recommendations for discharge: home with Home RN/PT/OT/HHA,  Supervision from family member during stair negotiation  Rationale for recommendations: Patient demonstrated functional mobility in order to safely discharge to home; requires supervision by family member during stair negotiation secondary to fall risk.  Patient requires home PT at this time as attending PT in a clinic setting would be a considerable and significantly taxing effort, limiting his ability to participate in therapy session.           Entered by: Arabella Pérez 09/15/2019 11:26 AM     Physical Therapy Discharge Summary    Reason for therapy discharge:    All goals and outcomes met, no further needs identified.    Progress towards therapy goal(s). See goals on Care Plan in [facility] electronic health record for goal details.  Goals met    Therapy recommendation(s):    Continued therapy is recommended.  Rationale/Recommendations:  Patient would benefit from home PT in order to assess safety of home environment, progress independent mobility and assess for safety with stair negotiation at home..  Continue daily walking program

## 2019-09-15 NOTE — PHARMACY-ADMISSION MEDICATION HISTORY
Redid admission med rec due to discrepancies discovered on discharge med rec.    Pre op RN added meds- , Tylenol with codeine,cyclobenzaprine, Celexa , imitrex,.    Discussed concerns with Dr. Orozco and went to talk with patient.    Patient was NOT taking buspar at time of admission- had stopped about 2 weeks before.    Lisinopril dose was 20 mg not 10 mg.  Celexa dose is 30 mg  Patient is NOT on Zoloft.    Lisinopril and  celexa added to med rec list, zoloft discontinued.   Because discharge rec already completed list does not pull in correctly.    Notified provider

## 2019-09-16 NOTE — PROGRESS NOTES
LATE NOTE AFTER DISCHARGE:     Received call from Yumiko with [facility]. Patient is outside their service area. She stated that [facility] , [facility] & [facility] service the area where patient lives. Also, per  note, patient was supposed to have PT orders but AVS shows only RN ordered.     Contacted [facility] ([phone]) as they are attached to patient's primary clinic, [facility]. Spoke with Karol. She requested CTS fax demographics, discharge orders, PT progress note and discharge summary to [phone]. Provided her with [facility] contact information for questions/issues. Await response from [facility].     CM will continue to follow.      Eliane Salmon RN, BSN, CTS  [facility]  [phone]

## 2020-06-25 DIAGNOSIS — Z11.59 ENCOUNTER FOR SCREENING FOR OTHER VIRAL DISEASES: Primary | ICD-10-CM

## 2020-07-27 DIAGNOSIS — Z11.59 ENCOUNTER FOR SCREENING FOR OTHER VIRAL DISEASES: ICD-10-CM

## 2020-07-27 PROCEDURE — U0003 INFECTIOUS AGENT DETECTION BY NUCLEIC ACID (DNA OR RNA); SEVERE ACUTE RESPIRATORY SYNDROME CORONAVIRUS 2 (SARS-COV-2) (CORONAVIRUS DISEASE [COVID-19]), AMPLIFIED PROBE TECHNIQUE, MAKING USE OF HIGH THROUGHPUT TECHNOLOGIES AS DESCRIBED BY CMS-2020-01-R: HCPCS | Performed by: ORTHOPAEDIC SURGERY

## 2020-07-28 LAB
SARS-COV-2 RNA SPEC QL NAA+PROBE: NOT DETECTED
SPECIMEN SOURCE: NORMAL

## 2020-07-28 ASSESSMENT — MIFFLIN-ST. JEOR: SCORE: 1492.61

## 2020-07-29 ASSESSMENT — MIFFLIN-ST. JEOR: SCORE: 1517.56

## 2020-07-30 ENCOUNTER — ANESTHESIA EVENT (OUTPATIENT)
Dept: SURGERY | Facility: CLINIC | Age: 60
DRG: 457 | End: 2020-07-30
Payer: COMMERCIAL

## 2020-07-30 ENCOUNTER — HOSPITAL ENCOUNTER (INPATIENT)
Facility: CLINIC | Age: 60
LOS: 2 days | Discharge: HOME | DRG: 457 | End: 2020-08-01
Attending: ORTHOPAEDIC SURGERY | Admitting: ORTHOPAEDIC SURGERY
Payer: COMMERCIAL

## 2020-07-30 ENCOUNTER — ANESTHESIA (OUTPATIENT)
Dept: SURGERY | Facility: CLINIC | Age: 60
DRG: 457 | End: 2020-07-30
Payer: COMMERCIAL

## 2020-07-30 ENCOUNTER — APPOINTMENT (OUTPATIENT)
Dept: GENERAL RADIOLOGY | Facility: CLINIC | Age: 60
DRG: 457 | End: 2020-07-30
Attending: ORTHOPAEDIC SURGERY
Payer: COMMERCIAL

## 2020-07-30 DIAGNOSIS — G89.18 POST-OP PAIN: Primary | ICD-10-CM

## 2020-07-30 PROBLEM — M43.25 FUSION OF SPINE OF THORACOLUMBAR REGION: Status: ACTIVE | Noted: 2020-07-30

## 2020-07-30 LAB
ABO + RH BLD: NORMAL
ABO + RH BLD: NORMAL
BLD GP AB SCN SERPL QL: NORMAL
BLOOD BANK CMNT PATIENT-IMP: NORMAL
HGB BLD-MCNC: 11.5 G/DL (ref 11.7–15.7)
SPECIMEN EXP DATE BLD: NORMAL

## 2020-07-30 PROCEDURE — 40000306 ZZH STATISTIC PRE PROC ASSESS II: Performed by: ORTHOPAEDIC SURGERY

## 2020-07-30 PROCEDURE — 25000128 H RX IP 250 OP 636: Performed by: ORTHOPAEDIC SURGERY

## 2020-07-30 PROCEDURE — 25000128 H RX IP 250 OP 636: Performed by: PHYSICIAN ASSISTANT

## 2020-07-30 PROCEDURE — 71000013 ZZH RECOVERY PHASE 1 LEVEL 1 EA ADDTL HR: Performed by: ORTHOPAEDIC SURGERY

## 2020-07-30 PROCEDURE — 25000132 ZZH RX MED GY IP 250 OP 250 PS 637: Performed by: PHYSICIAN ASSISTANT

## 2020-07-30 PROCEDURE — 25000128 H RX IP 250 OP 636: Performed by: ANESTHESIOLOGY

## 2020-07-30 PROCEDURE — 40000277 XR SURGERY CARM FLUORO LESS THAN 5 MIN W STILLS: Mod: TC

## 2020-07-30 PROCEDURE — 86850 RBC ANTIBODY SCREEN: CPT | Performed by: ORTHOPAEDIC SURGERY

## 2020-07-30 PROCEDURE — 86901 BLOOD TYPING SEROLOGIC RH(D): CPT | Performed by: ORTHOPAEDIC SURGERY

## 2020-07-30 PROCEDURE — 95940 IONM IN OPERATNG ROOM 15 MIN: CPT | Performed by: ORTHOPAEDIC SURGERY

## 2020-07-30 PROCEDURE — C1763 CONN TISS, NON-HUMAN: HCPCS | Performed by: ORTHOPAEDIC SURGERY

## 2020-07-30 PROCEDURE — 4A11X4G MONITORING OF PERIPHERAL NERVOUS ELECTRICAL ACTIVITY, INTRAOPERATIVE, EXTERNAL APPROACH: ICD-10-PCS | Performed by: ORTHOPAEDIC SURGERY

## 2020-07-30 PROCEDURE — 85018 HEMOGLOBIN: CPT | Performed by: ORTHOPAEDIC SURGERY

## 2020-07-30 PROCEDURE — 86900 BLOOD TYPING SEROLOGIC ABO: CPT | Performed by: ORTHOPAEDIC SURGERY

## 2020-07-30 PROCEDURE — 0RG7071 FUSION OF 2 TO 7 THORACIC VERTEBRAL JOINTS WITH AUTOLOGOUS TISSUE SUBSTITUTE, POSTERIOR APPROACH, POSTERIOR COLUMN, OPEN APPROACH: ICD-10-PCS | Performed by: ORTHOPAEDIC SURGERY

## 2020-07-30 PROCEDURE — 0RWA04Z REVISION OF INTERNAL FIXATION DEVICE IN THORACOLUMBAR VERTEBRAL JOINT, OPEN APPROACH: ICD-10-PCS | Performed by: ORTHOPAEDIC SURGERY

## 2020-07-30 PROCEDURE — 25800030 ZZH RX IP 258 OP 636: Performed by: ANESTHESIOLOGY

## 2020-07-30 PROCEDURE — 27211024 ZZHC OR SUPPLY OTHER OPNP: Performed by: ORTHOPAEDIC SURGERY

## 2020-07-30 PROCEDURE — 25000125 ZZHC RX 250: Performed by: ORTHOPAEDIC SURGERY

## 2020-07-30 PROCEDURE — 25800030 ZZH RX IP 258 OP 636: Performed by: NURSE ANESTHETIST, CERTIFIED REGISTERED

## 2020-07-30 PROCEDURE — 12000000 ZZH R&B MED SURG/OB

## 2020-07-30 PROCEDURE — 27210995 ZZH RX 272: Performed by: ORTHOPAEDIC SURGERY

## 2020-07-30 PROCEDURE — 37000009 ZZH ANESTHESIA TECHNICAL FEE, EACH ADDTL 15 MIN: Performed by: ORTHOPAEDIC SURGERY

## 2020-07-30 PROCEDURE — 37000008 ZZH ANESTHESIA TECHNICAL FEE, 1ST 30 MIN: Performed by: ORTHOPAEDIC SURGERY

## 2020-07-30 PROCEDURE — C1713 ANCHOR/SCREW BN/BN,TIS/BN: HCPCS | Performed by: ORTHOPAEDIC SURGERY

## 2020-07-30 PROCEDURE — 25000125 ZZHC RX 250: Performed by: NURSE ANESTHETIST, CERTIFIED REGISTERED

## 2020-07-30 PROCEDURE — 25000128 H RX IP 250 OP 636: Performed by: NURSE ANESTHETIST, CERTIFIED REGISTERED

## 2020-07-30 PROCEDURE — 36000071 ZZH SURGERY LEVEL 5 W FLUORO 1ST 30 MIN: Performed by: ORTHOPAEDIC SURGERY

## 2020-07-30 PROCEDURE — 71000012 ZZH RECOVERY PHASE 1 LEVEL 1 FIRST HR: Performed by: ORTHOPAEDIC SURGERY

## 2020-07-30 PROCEDURE — 99222 1ST HOSP IP/OBS MODERATE 55: CPT | Performed by: CLINICAL NURSE SPECIALIST

## 2020-07-30 PROCEDURE — 27210794 ZZH OR GENERAL SUPPLY STERILE: Performed by: ORTHOPAEDIC SURGERY

## 2020-07-30 PROCEDURE — 0SW304Z REVISION OF INTERNAL FIXATION DEVICE IN LUMBOSACRAL JOINT, OPEN APPROACH: ICD-10-PCS | Performed by: ORTHOPAEDIC SURGERY

## 2020-07-30 PROCEDURE — 36415 COLL VENOUS BLD VENIPUNCTURE: CPT | Performed by: ORTHOPAEDIC SURGERY

## 2020-07-30 PROCEDURE — 0SW004Z REVISION OF INTERNAL FIXATION DEVICE IN LUMBAR VERTEBRAL JOINT, OPEN APPROACH: ICD-10-PCS | Performed by: ORTHOPAEDIC SURGERY

## 2020-07-30 PROCEDURE — 36000069 ZZH SURGERY LEVEL 5 EA 15 ADDTL MIN: Performed by: ORTHOPAEDIC SURGERY

## 2020-07-30 PROCEDURE — 25800030 ZZH RX IP 258 OP 636: Performed by: ORTHOPAEDIC SURGERY

## 2020-07-30 DEVICE — IMPLANTABLE DEVICE
Type: IMPLANTABLE DEVICE | Site: SPINE THORACIC | Status: NON-FUNCTIONAL
Removed: 2021-03-11

## 2020-07-30 RX ORDER — OXYCODONE HYDROCHLORIDE 5 MG/1
5-10 TABLET ORAL
Status: DISCONTINUED | OUTPATIENT
Start: 2020-07-30 | End: 2020-07-31

## 2020-07-30 RX ORDER — LEVETIRACETAM 500 MG/1
1000 TABLET ORAL EVERY EVENING
Status: DISCONTINUED | OUTPATIENT
Start: 2020-07-30 | End: 2020-08-01 | Stop reason: HOSPADM

## 2020-07-30 RX ORDER — ONDANSETRON 2 MG/ML
4 INJECTION INTRAMUSCULAR; INTRAVENOUS EVERY 30 MIN PRN
Status: DISCONTINUED | OUTPATIENT
Start: 2020-07-30 | End: 2020-07-30 | Stop reason: HOSPADM

## 2020-07-30 RX ORDER — PROPOFOL 10 MG/ML
INJECTION, EMULSION INTRAVENOUS CONTINUOUS PRN
Status: DISCONTINUED | OUTPATIENT
Start: 2020-07-30 | End: 2020-07-30

## 2020-07-30 RX ORDER — LIDOCAINE 40 MG/G
CREAM TOPICAL
Status: DISCONTINUED | OUTPATIENT
Start: 2020-07-30 | End: 2020-07-30 | Stop reason: HOSPADM

## 2020-07-30 RX ORDER — NALOXONE HYDROCHLORIDE 0.4 MG/ML
.1-.4 INJECTION, SOLUTION INTRAMUSCULAR; INTRAVENOUS; SUBCUTANEOUS
Status: DISCONTINUED | OUTPATIENT
Start: 2020-07-30 | End: 2020-08-01 | Stop reason: HOSPADM

## 2020-07-30 RX ORDER — SODIUM CHLORIDE, SODIUM LACTATE, POTASSIUM CHLORIDE, CALCIUM CHLORIDE 600; 310; 30; 20 MG/100ML; MG/100ML; MG/100ML; MG/100ML
INJECTION, SOLUTION INTRAVENOUS CONTINUOUS
Status: DISCONTINUED | OUTPATIENT
Start: 2020-07-30 | End: 2020-07-30 | Stop reason: HOSPADM

## 2020-07-30 RX ORDER — ONDANSETRON 2 MG/ML
INJECTION INTRAMUSCULAR; INTRAVENOUS PRN
Status: DISCONTINUED | OUTPATIENT
Start: 2020-07-30 | End: 2020-07-30

## 2020-07-30 RX ORDER — LEVETIRACETAM 500 MG/1
500 TABLET ORAL EVERY MORNING
Status: DISCONTINUED | OUTPATIENT
Start: 2020-07-31 | End: 2020-08-01 | Stop reason: HOSPADM

## 2020-07-30 RX ORDER — DEXAMETHASONE SODIUM PHOSPHATE 4 MG/ML
INJECTION, SOLUTION INTRA-ARTICULAR; INTRALESIONAL; INTRAMUSCULAR; INTRAVENOUS; SOFT TISSUE PRN
Status: DISCONTINUED | OUTPATIENT
Start: 2020-07-30 | End: 2020-07-30

## 2020-07-30 RX ORDER — CYCLOBENZAPRINE HCL 10 MG
10 TABLET ORAL 3 TIMES DAILY PRN
Status: DISCONTINUED | OUTPATIENT
Start: 2020-07-30 | End: 2020-07-30

## 2020-07-30 RX ORDER — PROPOFOL 10 MG/ML
INJECTION, EMULSION INTRAVENOUS PRN
Status: DISCONTINUED | OUTPATIENT
Start: 2020-07-30 | End: 2020-07-30

## 2020-07-30 RX ORDER — ACETAMINOPHEN 325 MG/1
975 TABLET ORAL ONCE
Status: DISCONTINUED | OUTPATIENT
Start: 2020-07-30 | End: 2020-07-30 | Stop reason: HOSPADM

## 2020-07-30 RX ORDER — CEFAZOLIN SODIUM 2 G/100ML
2 INJECTION, SOLUTION INTRAVENOUS
Status: COMPLETED | OUTPATIENT
Start: 2020-07-30 | End: 2020-07-30

## 2020-07-30 RX ORDER — LIDOCAINE HYDROCHLORIDE 10 MG/ML
INJECTION, SOLUTION INFILTRATION; PERINEURAL PRN
Status: DISCONTINUED | OUTPATIENT
Start: 2020-07-30 | End: 2020-07-30

## 2020-07-30 RX ORDER — KETAMINE HYDROCHLORIDE 10 MG/ML
INJECTION INTRAMUSCULAR; INTRAVENOUS PRN
Status: DISCONTINUED | OUTPATIENT
Start: 2020-07-30 | End: 2020-07-30

## 2020-07-30 RX ORDER — ACETAMINOPHEN 325 MG/1
975 TABLET ORAL EVERY 8 HOURS
Status: DISCONTINUED | OUTPATIENT
Start: 2020-07-30 | End: 2020-08-01 | Stop reason: HOSPADM

## 2020-07-30 RX ORDER — HYDROMORPHONE HYDROCHLORIDE 1 MG/ML
.5-1 INJECTION, SOLUTION INTRAMUSCULAR; INTRAVENOUS; SUBCUTANEOUS
Status: DISCONTINUED | OUTPATIENT
Start: 2020-07-30 | End: 2020-08-01 | Stop reason: HOSPADM

## 2020-07-30 RX ORDER — NALOXONE HYDROCHLORIDE 0.4 MG/ML
.1-.4 INJECTION, SOLUTION INTRAMUSCULAR; INTRAVENOUS; SUBCUTANEOUS
Status: ACTIVE | OUTPATIENT
Start: 2020-07-30 | End: 2020-07-31

## 2020-07-30 RX ORDER — MAGNESIUM HYDROXIDE 1200 MG/15ML
LIQUID ORAL PRN
Status: DISCONTINUED | OUTPATIENT
Start: 2020-07-30 | End: 2020-07-30 | Stop reason: HOSPADM

## 2020-07-30 RX ORDER — ONDANSETRON 4 MG/1
4 TABLET, ORALLY DISINTEGRATING ORAL EVERY 30 MIN PRN
Status: DISCONTINUED | OUTPATIENT
Start: 2020-07-30 | End: 2020-07-30 | Stop reason: HOSPADM

## 2020-07-30 RX ORDER — SODIUM CHLORIDE AND POTASSIUM CHLORIDE 150; 900 MG/100ML; MG/100ML
INJECTION, SOLUTION INTRAVENOUS CONTINUOUS
Status: DISCONTINUED | OUTPATIENT
Start: 2020-07-30 | End: 2020-08-01 | Stop reason: HOSPADM

## 2020-07-30 RX ORDER — ONDANSETRON 4 MG/1
4 TABLET, ORALLY DISINTEGRATING ORAL EVERY 6 HOURS PRN
Status: DISCONTINUED | OUTPATIENT
Start: 2020-07-30 | End: 2020-08-01 | Stop reason: HOSPADM

## 2020-07-30 RX ORDER — KETOROLAC TROMETHAMINE 30 MG/ML
30 INJECTION, SOLUTION INTRAMUSCULAR; INTRAVENOUS EVERY 6 HOURS
Status: COMPLETED | OUTPATIENT
Start: 2020-07-30 | End: 2020-07-31

## 2020-07-30 RX ORDER — TRANEXAMIC ACID 10 MG/ML
5 INJECTION, SOLUTION INTRAVENOUS CONTINUOUS
Status: DISCONTINUED | OUTPATIENT
Start: 2020-07-30 | End: 2020-07-30 | Stop reason: HOSPADM

## 2020-07-30 RX ORDER — CEFAZOLIN SODIUM 1 G/50ML
1 INJECTION, SOLUTION INTRAVENOUS EVERY 8 HOURS
Status: COMPLETED | OUTPATIENT
Start: 2020-07-30 | End: 2020-07-31

## 2020-07-30 RX ORDER — LIDOCAINE 40 MG/G
CREAM TOPICAL
Status: DISCONTINUED | OUTPATIENT
Start: 2020-07-30 | End: 2020-08-01 | Stop reason: HOSPADM

## 2020-07-30 RX ORDER — VANCOMYCIN HYDROCHLORIDE 1 G/20ML
INJECTION, POWDER, LYOPHILIZED, FOR SOLUTION INTRAVENOUS PRN
Status: DISCONTINUED | OUTPATIENT
Start: 2020-07-30 | End: 2020-07-30 | Stop reason: HOSPADM

## 2020-07-30 RX ORDER — BUPIVACAINE HYDROCHLORIDE AND EPINEPHRINE 2.5; 5 MG/ML; UG/ML
INJECTION, SOLUTION EPIDURAL; INFILTRATION; INTRACAUDAL; PERINEURAL PRN
Status: DISCONTINUED | OUTPATIENT
Start: 2020-07-30 | End: 2020-07-30 | Stop reason: HOSPADM

## 2020-07-30 RX ORDER — ONDANSETRON 2 MG/ML
4 INJECTION INTRAMUSCULAR; INTRAVENOUS EVERY 6 HOURS PRN
Status: DISCONTINUED | OUTPATIENT
Start: 2020-07-30 | End: 2020-08-01 | Stop reason: HOSPADM

## 2020-07-30 RX ORDER — FENTANYL CITRATE 50 UG/ML
INJECTION, SOLUTION INTRAMUSCULAR; INTRAVENOUS PRN
Status: DISCONTINUED | OUTPATIENT
Start: 2020-07-30 | End: 2020-07-30

## 2020-07-30 RX ORDER — HYDROXYZINE HYDROCHLORIDE 25 MG/1
25 TABLET, FILM COATED ORAL EVERY 6 HOURS PRN
Status: DISCONTINUED | OUTPATIENT
Start: 2020-07-30 | End: 2020-08-01 | Stop reason: HOSPADM

## 2020-07-30 RX ORDER — AMOXICILLIN 250 MG
2 CAPSULE ORAL 2 TIMES DAILY
Status: DISCONTINUED | OUTPATIENT
Start: 2020-07-30 | End: 2020-08-01 | Stop reason: HOSPADM

## 2020-07-30 RX ORDER — FENTANYL CITRATE 50 UG/ML
25-50 INJECTION, SOLUTION INTRAMUSCULAR; INTRAVENOUS
Status: DISCONTINUED | OUTPATIENT
Start: 2020-07-30 | End: 2020-07-30 | Stop reason: HOSPADM

## 2020-07-30 RX ORDER — FAMOTIDINE 20 MG/1
20 TABLET, FILM COATED ORAL 2 TIMES DAILY
Status: DISCONTINUED | OUTPATIENT
Start: 2020-07-30 | End: 2020-08-01 | Stop reason: HOSPADM

## 2020-07-30 RX ORDER — TRANEXAMIC ACID 10 MG/ML
1000 INJECTION, SOLUTION INTRAVENOUS ONCE
Status: COMPLETED | OUTPATIENT
Start: 2020-07-30 | End: 2020-07-30

## 2020-07-30 RX ORDER — CARISOPRODOL 350 MG/1
350 TABLET ORAL 3 TIMES DAILY PRN
Status: DISCONTINUED | OUTPATIENT
Start: 2020-07-30 | End: 2020-08-01 | Stop reason: HOSPADM

## 2020-07-30 RX ORDER — AMOXICILLIN 250 MG
1 CAPSULE ORAL 2 TIMES DAILY
Status: DISCONTINUED | OUTPATIENT
Start: 2020-07-30 | End: 2020-08-01 | Stop reason: HOSPADM

## 2020-07-30 RX ORDER — CALCIUM CHLORIDE 100 MG/ML
INJECTION INTRAVENOUS; INTRAVENTRICULAR PRN
Status: DISCONTINUED | OUTPATIENT
Start: 2020-07-30 | End: 2020-07-30

## 2020-07-30 RX ORDER — GLYCOPYRROLATE 0.2 MG/ML
INJECTION, SOLUTION INTRAMUSCULAR; INTRAVENOUS PRN
Status: DISCONTINUED | OUTPATIENT
Start: 2020-07-30 | End: 2020-07-30

## 2020-07-30 RX ORDER — ACETAMINOPHEN 325 MG/1
650 TABLET ORAL EVERY 4 HOURS PRN
Status: DISCONTINUED | OUTPATIENT
Start: 2020-08-02 | End: 2020-08-01 | Stop reason: HOSPADM

## 2020-07-30 RX ORDER — GABAPENTIN 300 MG/1
600 CAPSULE ORAL 2 TIMES DAILY
Status: DISCONTINUED | OUTPATIENT
Start: 2020-07-30 | End: 2020-08-01 | Stop reason: HOSPADM

## 2020-07-30 RX ORDER — ALBUTEROL SULFATE 90 UG/1
2 AEROSOL, METERED RESPIRATORY (INHALATION) EVERY 4 HOURS PRN
Status: DISCONTINUED | OUTPATIENT
Start: 2020-07-30 | End: 2020-08-01 | Stop reason: HOSPADM

## 2020-07-30 RX ORDER — CEFAZOLIN SODIUM 1 G/3ML
1 INJECTION, POWDER, FOR SOLUTION INTRAMUSCULAR; INTRAVENOUS SEE ADMIN INSTRUCTIONS
Status: DISCONTINUED | OUTPATIENT
Start: 2020-07-30 | End: 2020-07-30 | Stop reason: HOSPADM

## 2020-07-30 RX ORDER — METHADONE HYDROCHLORIDE 10 MG/ML
20 INJECTION, SOLUTION INTRAMUSCULAR; INTRAVENOUS; SUBCUTANEOUS ONCE
Status: COMPLETED | OUTPATIENT
Start: 2020-07-30 | End: 2020-07-30

## 2020-07-30 RX ADMIN — HYDROMORPHONE HYDROCHLORIDE 1 MG: 1 INJECTION, SOLUTION INTRAMUSCULAR; INTRAVENOUS; SUBCUTANEOUS at 21:19

## 2020-07-30 RX ADMIN — CEFAZOLIN SODIUM 2 G: 2 INJECTION, SOLUTION INTRAVENOUS at 07:50

## 2020-07-30 RX ADMIN — MIDAZOLAM 1 MG: 1 INJECTION INTRAMUSCULAR; INTRAVENOUS at 14:49

## 2020-07-30 RX ADMIN — PHENYLEPHRINE HYDROCHLORIDE 100 MCG: 10 INJECTION INTRAVENOUS at 08:43

## 2020-07-30 RX ADMIN — SODIUM CHLORIDE, POTASSIUM CHLORIDE, SODIUM LACTATE AND CALCIUM CHLORIDE: 600; 310; 30; 20 INJECTION, SOLUTION INTRAVENOUS at 07:32

## 2020-07-30 RX ADMIN — KETOROLAC TROMETHAMINE 30 MG: 30 INJECTION, SOLUTION INTRAMUSCULAR at 21:19

## 2020-07-30 RX ADMIN — CEFAZOLIN 1 G: 1 INJECTION, POWDER, FOR SOLUTION INTRAMUSCULAR; INTRAVENOUS at 09:51

## 2020-07-30 RX ADMIN — Medication 2 UNITS: at 08:19

## 2020-07-30 RX ADMIN — SUGAMMADEX 200 MG: 100 INJECTION, SOLUTION INTRAVENOUS at 12:16

## 2020-07-30 RX ADMIN — CALCIUM CHLORIDE 200 MG: 100 INJECTION, SOLUTION INTRAVENOUS at 09:16

## 2020-07-30 RX ADMIN — LEVETIRACETAM 1000 MG: 500 TABLET, FILM COATED ORAL at 20:25

## 2020-07-30 RX ADMIN — TRANEXAMIC ACID 1 G: 10 INJECTION, SOLUTION INTRAVENOUS at 12:14

## 2020-07-30 RX ADMIN — DOCUSATE SODIUM AND SENNOSIDES 1 TABLET: 8.6; 5 TABLET ORAL at 20:26

## 2020-07-30 RX ADMIN — PHENYLEPHRINE HYDROCHLORIDE 100 MCG: 10 INJECTION INTRAVENOUS at 08:02

## 2020-07-30 RX ADMIN — METHADONE HYDROCHLORIDE 20 MG: 10 INJECTION, SOLUTION INTRAMUSCULAR; INTRAVENOUS; SUBCUTANEOUS at 08:06

## 2020-07-30 RX ADMIN — Medication 2 UNITS: at 08:07

## 2020-07-30 RX ADMIN — PHENYLEPHRINE HYDROCHLORIDE 100 MCG: 10 INJECTION INTRAVENOUS at 09:13

## 2020-07-30 RX ADMIN — MIDAZOLAM 2 MG: 1 INJECTION INTRAMUSCULAR; INTRAVENOUS at 07:50

## 2020-07-30 RX ADMIN — PHENYLEPHRINE HYDROCHLORIDE 0.3 MCG/KG/MIN: 10 INJECTION INTRAVENOUS at 08:52

## 2020-07-30 RX ADMIN — CEFAZOLIN SODIUM 1 G: 1 INJECTION, SOLUTION INTRAVENOUS at 20:26

## 2020-07-30 RX ADMIN — ROCURONIUM BROMIDE 20 MG: 10 INJECTION INTRAVENOUS at 09:46

## 2020-07-30 RX ADMIN — OXYCODONE HYDROCHLORIDE 5 MG: 5 TABLET ORAL at 19:55

## 2020-07-30 RX ADMIN — ONDANSETRON HYDROCHLORIDE 4 MG: 2 INJECTION, SOLUTION INTRAVENOUS at 12:10

## 2020-07-30 RX ADMIN — PHENYLEPHRINE HYDROCHLORIDE 200 MCG: 10 INJECTION INTRAVENOUS at 08:03

## 2020-07-30 RX ADMIN — ACETAMINOPHEN 975 MG: 325 TABLET, FILM COATED ORAL at 19:47

## 2020-07-30 RX ADMIN — Medication 50 MG: at 10:34

## 2020-07-30 RX ADMIN — PHENYLEPHRINE HYDROCHLORIDE 100 MCG: 10 INJECTION INTRAVENOUS at 09:01

## 2020-07-30 RX ADMIN — VASOPRESSIN 3 UNITS/HR: 20 INJECTION INTRAVENOUS at 08:25

## 2020-07-30 RX ADMIN — LIDOCAINE HYDROCHLORIDE 30 MG: 10 INJECTION, SOLUTION INFILTRATION; PERINEURAL at 07:58

## 2020-07-30 RX ADMIN — ROCURONIUM BROMIDE 100 MG: 10 INJECTION INTRAVENOUS at 07:59

## 2020-07-30 RX ADMIN — FENTANYL CITRATE 50 MCG: 50 INJECTION, SOLUTION INTRAMUSCULAR; INTRAVENOUS at 13:48

## 2020-07-30 RX ADMIN — FENTANYL CITRATE 50 MCG: 50 INJECTION, SOLUTION INTRAMUSCULAR; INTRAVENOUS at 13:15

## 2020-07-30 RX ADMIN — PHENYLEPHRINE HYDROCHLORIDE 100 MCG: 10 INJECTION INTRAVENOUS at 08:54

## 2020-07-30 RX ADMIN — FENTANYL CITRATE 50 MCG: 50 INJECTION, SOLUTION INTRAMUSCULAR; INTRAVENOUS at 15:30

## 2020-07-30 RX ADMIN — OXYCODONE HYDROCHLORIDE 10 MG: 5 TABLET ORAL at 22:56

## 2020-07-30 RX ADMIN — Medication 1 UNITS: at 08:06

## 2020-07-30 RX ADMIN — PROPOFOL 50 MCG/KG/MIN: 10 INJECTION, EMULSION INTRAVENOUS at 08:08

## 2020-07-30 RX ADMIN — PHENYLEPHRINE HYDROCHLORIDE 150 MCG: 10 INJECTION INTRAVENOUS at 09:20

## 2020-07-30 RX ADMIN — CEFAZOLIN 1 G: 1 INJECTION, POWDER, FOR SOLUTION INTRAMUSCULAR; INTRAVENOUS at 11:56

## 2020-07-30 RX ADMIN — SODIUM CHLORIDE, POTASSIUM CHLORIDE, SODIUM LACTATE AND CALCIUM CHLORIDE: 600; 310; 30; 20 INJECTION, SOLUTION INTRAVENOUS at 08:10

## 2020-07-30 RX ADMIN — FENTANYL CITRATE 100 MCG: 50 INJECTION, SOLUTION INTRAMUSCULAR; INTRAVENOUS at 07:58

## 2020-07-30 RX ADMIN — FENTANYL CITRATE 50 MCG: 50 INJECTION, SOLUTION INTRAMUSCULAR; INTRAVENOUS at 14:31

## 2020-07-30 RX ADMIN — PHENYLEPHRINE HYDROCHLORIDE 100 MCG: 10 INJECTION INTRAVENOUS at 09:08

## 2020-07-30 RX ADMIN — KETOROLAC TROMETHAMINE 30 MG: 30 INJECTION, SOLUTION INTRAMUSCULAR at 15:04

## 2020-07-30 RX ADMIN — TRANEXAMIC ACID 1 G: 10 INJECTION, SOLUTION INTRAVENOUS at 08:10

## 2020-07-30 RX ADMIN — GLYCOPYRROLATE 0.2 MG: 0.2 INJECTION, SOLUTION INTRAMUSCULAR; INTRAVENOUS at 07:58

## 2020-07-30 RX ADMIN — DEXAMETHASONE SODIUM PHOSPHATE 8 MG: 4 INJECTION, SOLUTION INTRA-ARTICULAR; INTRALESIONAL; INTRAMUSCULAR; INTRAVENOUS; SOFT TISSUE at 07:58

## 2020-07-30 RX ADMIN — FAMOTIDINE 20 MG: 20 TABLET ORAL at 20:26

## 2020-07-30 RX ADMIN — PROPOFOL 200 MG: 10 INJECTION, EMULSION INTRAVENOUS at 07:58

## 2020-07-30 RX ADMIN — HYDROMORPHONE HYDROCHLORIDE 0.75 MG: 1 INJECTION, SOLUTION INTRAMUSCULAR; INTRAVENOUS; SUBCUTANEOUS at 19:05

## 2020-07-30 RX ADMIN — POTASSIUM CHLORIDE AND SODIUM CHLORIDE: 900; 150 INJECTION, SOLUTION INTRAVENOUS at 20:12

## 2020-07-30 RX ADMIN — HYDROMORPHONE HYDROCHLORIDE 0.5 MG: 1 INJECTION, SOLUTION INTRAMUSCULAR; INTRAVENOUS; SUBCUTANEOUS at 16:58

## 2020-07-30 RX ADMIN — GABAPENTIN 600 MG: 300 CAPSULE ORAL at 20:25

## 2020-07-30 RX ADMIN — SODIUM CHLORIDE, POTASSIUM CHLORIDE, SODIUM LACTATE AND CALCIUM CHLORIDE: 600; 310; 30; 20 INJECTION, SOLUTION INTRAVENOUS at 10:29

## 2020-07-30 RX ADMIN — FENTANYL CITRATE 50 MCG: 50 INJECTION, SOLUTION INTRAMUSCULAR; INTRAVENOUS at 09:58

## 2020-07-30 RX ADMIN — FENTANYL CITRATE 50 MCG: 50 INJECTION, SOLUTION INTRAMUSCULAR; INTRAVENOUS at 13:27

## 2020-07-30 SDOH — HEALTH STABILITY: MENTAL HEALTH: CURRENT SMOKER: 0

## 2020-07-30 ASSESSMENT — COPD QUESTIONNAIRES: COPD: 1

## 2020-07-30 ASSESSMENT — MIFFLIN-ST. JEOR: SCORE: 1492.61

## 2020-07-30 ASSESSMENT — ACTIVITIES OF DAILY LIVING (ADL): ADLS_ACUITY_SCORE: 13

## 2020-07-30 NOTE — ANESTHESIA PROCEDURE NOTES
ARTERIAL LINE PROCEDURE NOTE:  Staff -   Anesthesiologist:  Rancho Garza MD      Performed By: anesthesiologist    Referred By: Kate     Pre-Procedure  Performed by Rancho Garza MD  Referred by Kate  Location: OR      Pre-Anesthestic Checklist: patient identified, IV checked, risks and benefits discussed, informed consent, monitors and equipment checked and pre-op evaluation    Timeout  Correct Patient: Yes   Correct Procedure: Yes   Correct Site: Yes   Correct Laterality: Yes   Correct Position: Yes   Site Marked: No   .   Procedure Documentation  Procedure: arterial line      Insertion Site:right (Radial).. .  Patient Prep/Sterile Barriers; all elements of maximal sterile barrier technique followed, mask, hat, sterile gown, sterile gloves, draped, hand hygiene, chlorhexidine gluconate and isopropyl alcohol    Assessment/Narrative    Catheter: 20 gauge, 1.75 in/4.5 cm quick cath (integral wire)     Secured by transparent dressing  Tegaderm dressing used.    Arterial waveform: Yes IBP within 10% of NIBP: Yes

## 2020-07-30 NOTE — CONSULTS
[facility]  Pain Service Consultation   Text Page    Date of Admission:  7/30/2020    Assessment & Plan   Josselin Cunha is a 59 year old female who was admitted on 7/30/2020. I was asked by Josselin Ryan PA-C to see the patient for pain management.    Met with Josselin as she was resting in bed. She was groggy, but able to give insight. She introduced me to her sister at bedside, whom she asked to remain for our assessment.     1)  Acute thoracic pain s/p T7-12 fusion per Christian Love MD earlier today  History of L1-L3 Transforaminal or Oblique Interbody Fusion on 9/12/2019 by Christian Love MD and L3-4 Oblique lumbar interbody fusion and posterolateral fusion and revision instrumentation L3-L5 on 1/17/2019 by Christian Love MD       2)  Patient with chronic back and leg pain, on chronic opioid therapy managed by Christian Love MD  Baseline 30 mg Daily Morphine Equivalent as dispensed as reported daily use.  Patient has a possible opioid tolerance.     Patient's opioid use in past 24 hours: 400 mg IV Fentanyl 0.5 mg IV Dilaudid and 20 mg IV Methadone = unable to calculate mg Daily Morphine Equivalent due to the variable half life of Methadone    3)  Risk factors for opioid related harms  -History of overdose  -Sleep disordered breathing  -Anxiety/depression  -Opioid has recently been changed    4)  Opioid induced side-effects:  -Constipation - No/denies  -Nausea/Vomit - No/denies  -Sedation - YES as patient is groggy  -Urinary Retention - kay catheter    5)  Other/Related:  -Chron's disease   -Seizure disorder    -COPD and Asthma   -Depression/anxiety - PTSD  -Deconditioning   -She has not had sleep study, but snores loudly would question Sleep Apnea    PLAN:   1)  Capnography over night given risk of ULISES and surgical opiate use    2)  Non-opioid multimodal medication therapy  -Topical:Not indicated   -N-SAIDS:Ketorolac 30 mg every 6 hour per surgical orders  -Muscle Relaxants: Soma 350 mg TID  PRN  -Adjuvants:  Acetaminophen 975 mg TID, Gabapentin 600 mg BID   -Antidepressants/anxiolytics:  Chronic Citalopram 30 mg Daily  History seizure disorder on Keppra 500 mg morning and 1,000 evening    3)  Non-medication interventions  Positioning, ICE, Relaxation, Meditation, Distraction, Physical therapy     4)  Opioids: Oxycodone 5-10 mg every 3 hours PRN and IV Dilaudid for severe pain   Opioids Treatment Goal: -Improvement in function  -Participate in PT  -Post-operative management of pain, expected 3-7 days needed    5)  Constipation Prophylaxis  Senna-docusate 1-2 tablets BID Continue to Monitor, Bowel Meds PRN per Constipation Order Set    6)  Pain Education  -Opioid safe use, storage and disposal information included in DC AVS    7)  DC Planning   Discussed goal of opioid therapy as above with patient, sister and bedside nurse Bebe Wilson RN  Length of therapy is less than 10 days, opioids may be stopped without taper.  Continued outpatient management of pain per Christian Love MD   Disposition: TBD  Support systems: Sister  Outpatient Referrals: None needed from pain perspective  The following risk factors have been identified for unintentional overdose: patient is on multiple sedating medications  and patient has anxiety, depression or PTSD. Discharge with intra-nasal naloxone if discharged to home with opioids  >40 mg MME/day.  Plan for education prior to discharge.    Time Spent on this Encounter   Total unit/floor time 5:20 PM until 6:10 PM minutes, time consisted of the following, examination of the patient, reviewing the record and completing documentation. >50% of time spent in counseling and coordination of care.  Time spend counseling with patient and family consisted of the following topics, symptom management.  Time spent in coordination of care with Bedside Nurse Bebe Wilson RN.     Marina Gustafson MS, RN, CNS, APRN, ACHPN, FAACVPR  Pain and Palliative Care  Pager [phone]  Office  "[phone]       Primary Care Physician   Primary Care Physician:[facility]  Pain Specialist: None - followed by Christian Love MD preoperatively     Chief Complaint   No chief complaint on file.    History is obtained from the patient, electronic health record and patient's sister    History of Present Illness   Josselin Cunha is a 59 year old female who presents with chronic back pain for surgery with Dr. Love. Patient has history     CURRENT PAIN:  Her pain is located in the mid back  It is described as Exhausting, Gnawing and Miserable  She rates it as ranging between 7/10 and 10/10  The average is 9/10 on a scale of 0-10  Currently it is rated as 10/10  It improves by \"Dr. Love helped a lot\"  It worsens by \"Moving\"  She has been compliant with the recommendations while in the hospital.    PAST PAIN TREATMENT:   Medications: All opiates  Non-pharmacologic modalities:  Physical therapy, ice   Previous interventions/surgeries:  Previous spine surgeries per Dr. Love    [facility] database review: Opiates per Christian Love MD: Soma 350 mg (54 tablets obtained 7/28/2020) Gabapentin 300 mg (90 capsules) and 600 mg (30 capsules obtained 7/24/2020)            D.I.R.E Score: Patient Selection for Chronic Opioid Analgesia    For each factor, rate the patient's score from 1 - 3 based on the explanations on the right.       Diagnosis             3         1 = Benign chronic condition with minimal objective findings or no definite medical diagnosis.  Examples:  fibromyalgia, migraine, headaches, non-specific back pain.  2 = Slowly progressive condition concordant with moderate pain, or fixed condition with moderate objective findings.  Examples: failed back surgery syndrome, back pain with moderate degenerative changes, neuropathic pain.  3 = Advanced condition concordant with severe pain with objective findings.  Examples: severe ischemic vascular disease, advanced neuropathy, severe spinal stenosis.    Intractability             " 3         1 = Few therapies have been tried and the patient takes a passive role in his/her pain management process.   2 = Most costmary treatments have been tried but the patient is not fully engaged in the pain management process, or barriers prevent (insurance, transportation, medical illness)  3 = Patient fully engaged in a spectrum of appropriate treatments but with inadequate response.    Risk   (Risk = Total of P+C+R+S below)       Psychological             2         1 = Serious personality dysfunction or mental illness interfering with care.  Examples: personality disorder, severe affective disorder, significant personality issues.  2 = Personality or mental health interferes moderately.  Example: depression or anxiety disorder.  3 = Good communication with the clinic.  No significant personality dysfunction or mental illness.       Chemical      Health             2         1 = Active or very recent use of illicit drugs, excessive alcohol, or prescription drug abuse.  2 = Chemical coper (uses medications to cope with stress) or history of chemical dependency in remission.  3 = No CD history.  Not drug-focused or chemically reliant       Reliability             2         1 = History of numerous problems: medication misuse, missed appointments, rarely follows through.  2 = Occasional difficulties with compliance, but generally reliable.  3 = Highly reliable patient with medications, appointments and treatment.       Social      Support             3         1= Life in chaos.  Little family support and few close relationships.  Loss of most normal life roles.  2 = Reduction in some relationships and life roles.  3 = Supportive family/close relationships.  Involved in work or school and no social isolation.    Efficacy score             3         1 = Poor function or minimal pain relief despite moderate to high doses.  2 = Moderate benefit with function improved in a number of ways (or insufficient info -  hasn't tried opioid yet or very low doses or too short a trial.  3 = Good improvement in pain and function and quality of life with stable doses over time.                                    18    Total score = D + I + R + E    Score 7-13: Not a suitable candidate for long-term opioid analgesia  Score 14-21: May be a good candidate for long-term opioid analgesia    Copyright 2013 Carrington Mcmahon MD, The DIRE Score: Predicting Outcomes of Opioid Prescribing for Chronic Pain. The Journal of Pain. 7(9) (September), 2006:671-681    Past Medical History   I have reviewed this patient's medical history and updated it with pertinent information if needed.   Past Medical History:   Diagnosis Date     Acute Crohn's disease (H)      Complication of anesthesia     needs nebs before surgery     COPD (chronic obstructive pulmonary disease) (H)      Drug overdose      Hypertension      Noninfectious ileitis     Chrons     Other chronic pain     back     PTSD (post-traumatic stress disorder)      Seizures (H)     last seizure three years ago     Sleep apnea     not tested but thinks she has it     Uncomplicated asthma        Past Surgical History   I have reviewed this patient's surgical history and updated it with pertinent information if needed.  Past Surgical History:   Procedure Laterality Date     ABDOMEN SURGERY      4 c-sections     BACK SURGERY      4 back surgeries     DECOMPRESSION, FUSION LUMBAR POSTERIOR THREE + LEVELS, COMBINED N/A 9/12/2019    Procedure: 1.  Removal of prior instrumentation, L3, L4, L5.  2.  Exploration of spinal fusion.  3.  Repeat posterolateral fusion at L3-L4.  4.  Posterior fusion, T11-T12, L1, L2, L3.  5.  Segmental instrumentation T11-12, L2, L2, L3 L4, L5 and sacrum using iliac screws.;  Surgeon: Christian Love MD;  Location: [facility] OR     DECOMPRESSION, FUSION LUMBAR POSTERIOR TWO LEVELS, COMBINED N/A 1/17/2019    Procedure: L3 - L4  oblique lateral lumbar interbody fusion  L3 Posterior  minimally invasive pedicle screw placement and posterolateral instrumentation L3 to L4  and  Posterior fusion  cut off old maine below L4 and remove old screws at L4 bilateral and replacement;  Surgeon: Christian Love MD;  Location: [facility] OR     EXPLORE SPINE, REMOVE HARDWARE, COMBINED N/A 9/12/2019    Procedure: Explore spine, remove hardware, combined;  Surgeon: Christian Love MD;  Location: [facility] OR     GI SURGERY  2014    bowel resection for chrons     ORTHOPEDIC SURGERY Bilateral     knee replacement         Prior to Admission Medications   Prior to Admission Medications   Prescriptions Last Dose Informant Patient Reported? Taking?   SUMAtriptan (IMITREX) 100 MG tablet More than a month at Unknown time  Yes No   Sig: Take 100 mg by mouth at onset of headache for migraine   albuterol (PROAIR HFA/PROVENTIL HFA/VENTOLIN HFA) 108 (90 Base) MCG/ACT inhaler   Yes Yes   Sig: Inhale 2 puffs into the lungs every 4 hours as needed for shortness of breath / dyspnea or wheezing   citalopram (CELEXA) 20 MG tablet Past Week at Unknown time  Yes Yes   Sig: Take 30 mg by mouth daily   gabapentin (NEURONTIN) 300 MG capsule 7/29/2020 at Unknown time  Yes Yes   Sig: Take 600 mg by mouth 2 times daily Takes 2 capsules in the morning and 2 capsules in the evening    levETIRAcetam (KEPPRA) 1000 MG tablet 7/29/2020 at Unknown time  Yes Yes   Sig: Take 1,000 mg by mouth every evening    levETIRAcetam (KEPPRA) 500 MG tablet 7/30/2020 at Unknown time  Yes Yes   Sig: Take 500 mg by mouth every morning   oxyCODONE-acetaminophen (PERCOCET)  MG per tablet   No Yes   Sig: Take 1 tablet by mouth every 6 hours as needed for severe pain      Facility-Administered Medications: None     Allergies   Allergies   Allergen Reactions     Tramadol      Instructed not to take per neurologist exacerbates seizures       Social History   I have reviewed this patient's social history and updated it with pertinent information if needed. Josselin Cunha   reports that she quit smoking about 20 months ago. She has never used smokeless tobacco. She reports that she does not drink alcohol or use drugs.    Family History   I have reviewed this patient's family history and updated it with pertinent information if needed.   Family History   Problem Relation Age of Onset     Diabetes Mother      Atrial fibrillation Mother      Diabetes Father      Family history of addiction  - not assessed as patient too groggy    Review of Systems   The 10 point Review of Systems is negative other than noted in the HPI or here.    Denies Bowel or bladder dysfunction    Physical Exam   Temp:  [97.7  F (36.5  C)-98.6  F (37  C)] 98.1  F (36.7  C)  Pulse:  [66-91] 66  Heart Rate:  [80-91] 82  Resp:  [9-20] 12  BP: (112-165)/() 141/88  SpO2:  [90 %-99 %] 96 %  208 lbs 0 oz  GEN:  Groggy  female, oriented x 3, appears comfortable, no apparent distress.  HEENT:  Normocephalic/atraumatic, no scleral icterus, no nasal discharge, mouth moist.  CV:  RRR, S1, S2; no murmurs or other irregularities noted.  +3 DP/PT pulses bilaterally; no edema bilateral lower extremities.  RESP:  Faint crackles at left lower otherwise clear to auscultation bilaterally.  Symmetric chest rise on inhalation noted.  Normal respiratory effort.  ABD:  Rounded, soft, non-tender/non-distended.  +BS  EXT:  Edema & pulses as noted above.  Color, moisture and sensation intact x 4.  M/S:   Surgical site is tender to palpation.    SKIN:  Dry to touch, no exanthems noted in the visualized areas.    NEURO: Symmetric strength +5/5.  Sensation to touch intact all extremities.   There is no area of allodynia or hyperesthesia.  PAIN BEHAVIOR: Cooperative  Psych:  Normal affect.  Calm, cooperative, conversant appropriately.       Data   Results for orders placed or performed during the hospital encounter of 07/30/20   XR Surgery NORRIS L/T 5 Min Fluoro w Stills     Status: None    Narrative    This exam was marked as  non-reportable because it will not be read by a   radiologist or a [place] non-radiologist provider.         Hemoglobin     Status: Abnormal   Result Value Ref Range    Hemoglobin 11.5 (L) 11.7 - 15.7 g/dL   ABO/Rh type and screen     Status: None   Result Value Ref Range    ABO O     RH(D) Neg     Antibody Screen Neg     Test Valid Only At [facility]        Specimen Expires 08/02/2020    Results for orders placed or performed in visit on 07/30/20   A Line Catheter Placement     Status: None    Narrative    Rancho Garza MD     7/30/2020  8:20 AM    ARTERIAL LINE PROCEDURE NOTE:  Staff -   Anesthesiologist:  Rancho Garza MD      Performed By: anesthesiologist    Referred By: Kate     Pre-Procedure  Performed by Rancho Garza MD  Referred by Kate  Location: OR      Pre-Anesthestic Checklist: patient identified, IV checked, risks and   benefits discussed, informed consent, monitors and equipment checked and   pre-op evaluation    Timeout  Correct Patient: Yes   Correct Procedure: Yes   Correct Site: Yes   Correct Laterality: Yes   Correct Position: Yes   Site Marked: No   .   Procedure Documentation  Procedure: arterial line      Insertion Site:right (Radial).. .  Patient Prep/Sterile Barriers; all   elements of maximal sterile barrier technique followed, mask, hat, sterile   gown, sterile gloves, draped, hand hygiene, chlorhexidine gluconate and   isopropyl alcohol    Assessment/Narrative    Catheter: 20 gauge, 1.75 in/4.5 cm quick cath (integral wire)     Secured by transparent dressing  Tegaderm dressing used.    Arterial waveform: Yes IBP within 10% of NIBP: Yes

## 2020-07-30 NOTE — PHARMACY-ADMISSION MEDICATION HISTORY
PTA meds completed by pre-admitting nurse ( Isabel Meza  ). No further clarifications required by pharmacy.    Prior to Admission medications    Medication Sig Last Dose Taking? Auth Provider   albuterol (PROAIR HFA/PROVENTIL HFA/VENTOLIN HFA) 108 (90 Base) MCG/ACT inhaler Inhale 2 puffs into the lungs every 4 hours as needed for shortness of breath / dyspnea or wheezing  Yes Reported, Patient   citalopram (CELEXA) 20 MG tablet Take 30 mg by mouth daily Past Week at Unknown time Yes Unknown, Entered By History   gabapentin (NEURONTIN) 300 MG capsule Take 600 mg by mouth 2 times daily Takes 2 capsules in the morning and 2 capsules in the evening  7/29/2020 at Unknown time Yes Reported, Patient   levETIRAcetam (KEPPRA) 1000 MG tablet Take 1,000 mg by mouth every evening  7/29/2020 at Unknown time Yes Reported, Patient   levETIRAcetam (KEPPRA) 500 MG tablet Take 500 mg by mouth every morning 7/30/2020 at Unknown time Yes Unknown, Entered By History   oxyCODONE-acetaminophen (PERCOCET)  MG per tablet Take 1 tablet by mouth every 6 hours as needed for severe pain  Yes Christian Love MD   SUMAtriptan (IMITREX) 100 MG tablet Take 100 mg by mouth at onset of headache for migraine More than a month at Unknown time  Reported, Patient

## 2020-07-30 NOTE — OP NOTE
Procedure Date: 07/30/2020      PREOPERATIVE DIAGNOSES:  Proximal junctional kyphosis, thoracolumbar kyphosis, compression fracture at T12, T11 area.      POSTOPERATIVE DIAGNOSES:  Proximal junctional kyphosis, thoracolumbar kyphosis, compression fracture at T12, T11 area.      SURGEON:  Christian Love MD      FIRST ASSISTANT:  Josselin Ryan PA-C.  First assistant is necessary for patient positioning, wound closure, suctioning and tissue retraction.      PROCEDURES PERFORMED:   1.  T7 through T12 posterolateral fusion.   2.  Revision instrumentation T7 through sacrum with segmental fixation with new pedicle screws at T8, T9, T10 connecting to the old fusion by removing top 7 screws of the prior fusion mass.   3.  Local bone graft harvesting supplemented with bone marrow-soaked calcium triphosphate.      ESTIMATED BLOOD LOSS:  200 mL      OPERATING TIME:  Approximately 4-1/2 hours.      INDICATIONS FOR PROCEDURE:  The patient is a 59-year-old woman who developed a compression fracture and proximal junctional kyphosis above prior fusion resulting in severe thoracolumbar kyphosis and severe incapacitating pain.  The aforementioned procedure was recommended.  Cantilever maneuver was used to correct the thoracolumbar deformity.  The nature of surgery, risks, and alternatives were discussed.  The patient opted to proceed.      DESCRIPTION OF PROCEDURE:  The patient was brought to the operating theater.  General endotracheal anesthesia was induced in an uneventful manner.  Dimas catheter was inserted.  The patient was hooked up for intraoperative SSEP and EMG monitoring.  The hips were extended to minimize flat back deformity.  After routine prep and drape of the entire thoracolumbar area, midline incision was made approximately from T6 through sacrum.  Skin was incised.  Subcutaneous tissue was incised.  Previously inserted instrumentation was exposed.  Previous screws were noted at T12 through S1.  The rods were cut just  above the L5 screws and proximal 4 screws were removed from the prior fusion mass.  These were replaced with 1 mm larger diameter [employer] pedicle reduction screws.  Each screw had excellent tightness into the purchase site.  New screws inserted into the pedicles of T8, T9, T10.  These were 50 mm length screws and each screw had a firm purchase at the purchase site.  AP and lateral images showed good positioning of the screws inserted and the EMG monitoring ensured that the screws were in safe location without the breach of the pedicles as though the screws stimulated greater than 15 milliamps.  Decortication of the prior fusion mass was carried out and fusion was carried out to T7 area by decortication, harvesting local bone, morcellized and then mixing with bone marrow-soaked calcium triphosphate.  This was laid from T7 through T12.  Then, using Cantilever technique engaging the distal portion of the maine to the old fusion mass from T12 through L4, the rods were pushed down further corrected with thoracolumbar kyphosis and engaged into the new screws at T8, T9, T10.  Final images showed good reduction of the thoracolumbar kyphosis that was present preoperatively and good implant position.  Nuts were tightened down using a counter torque wrench.  Reduction of the screw heads, tips were taken off.  Wound was irrigated.  Vancomycin powder sprayed into the wound.  Deep fascial tissue was reapproximated using #1 Vicryl suture.  Subcutaneous tissue was reapproximated using 2-0 Vicryl suture.  Skin was reapproximated using skin staples.  A sterile dressing was applied.  The patient was turned into supine position.  The patient was extubated and patient was taken to the recovery room in good condition.  The patient tolerated the procedure well.        The patient had morbid obesity with BMI of 37, making the surgery more difficult and laborious, taking at least 1 hour of additional OR time.         LUDY PERKINS MD              D: 2020   T: 2020   MT: [email or web address]      Name:     FERNANDO PEREZ   MRN:      [id number]        Account:        [id number]   :      1960           Procedure Date: 2020      Document: [id number]

## 2020-07-30 NOTE — ANESTHESIA CARE TRANSFER NOTE
Patient: Josselin Cunha    Procedure(s):  Open Thoracic 7-Thoracic 12 POSTERIOR FUSION with REVISION OF INSTRUMENTATION FROM Thoracic 7-Sacral 1    Diagnosis: Thoracic compression fracture (H) [S22.000A]  Diagnosis Additional Information: No value filed.    Anesthesia Type:   General     Note:  Airway :Face Mask  Patient transferred to:PACU  Comments:   Spontaneous respirations, oral suctioned, bilateral eye opening and hand grasps.  Extubated to FM O2 6lpm.  VSS to PACU.Handoff Report: Identifed the Patient, Identified the Reponsible Provider, Reviewed the pertinent medical history, Discussed the surgical course, Reviewed Intra-OP anesthesia mangement and issues during anesthesia, Set expectations for post-procedure period and Allowed opportunity for questions and acknowledgement of understanding      Vitals: (Last set prior to Anesthesia Care Transfer)    CRNA VITALS  7/30/2020 1230 - 7/30/2020 1311      7/30/2020             NIBP:  134/78    Pulse:  82    SpO2:  98 %    EKG:  Sinus rhythm                Electronically Signed By: ALEJO Culver CRNA  July 30, 2020  1:11 PM

## 2020-07-30 NOTE — ANESTHESIA POSTPROCEDURE EVALUATION
Patient: Josselin Cunha    Procedure(s):  Open Thoracic 7-Thoracic 12 POSTERIOR FUSION with REVISION OF INSTRUMENTATION FROM Thoracic 7-Sacral 1    Diagnosis:Thoracic compression fracture (H) [S22.000A]  Diagnosis Additional Information: No value filed.    Anesthesia Type:  General    Note:  Anesthesia Post Evaluation    Patient location during evaluation: PACU  Patient participation: Able to fully participate in evaluation  Level of consciousness: awake and alert  Pain management: adequate  multimodal analgesia used between 6 hours prior to anesthesia start to PACU dischargeAirway patency: patent  Cardiovascular status: acceptable  Respiratory status: acceptable  two or more mitigation strategies used for obstructive sleep apneaHydration status: acceptable  PONV: none     Anesthetic complications: None          Last vitals:  Vitals:    07/30/20 1535 07/30/20 1545 07/30/20 1600   BP: (!) 147/95  112/75   Pulse: 84  84   Resp: 10 17 11   Temp:  98.6  F (37  C)    SpO2: 91% 97% 96%         Electronically Signed By: Rancho Garza MD  July 30, 2020  4:15 PM

## 2020-07-30 NOTE — PROVIDER NOTIFICATION
MDA notified that patient's DBP occasionally 100s.  Stated okay for patient to transfer upstairs to inpatient room.  RN receiving care notified.

## 2020-07-30 NOTE — ANESTHESIA PREPROCEDURE EVALUATION
Anesthesia Pre-Procedure Evaluation    Patient: Josselin Cunha   MRN: [id number] : 1960          Preoperative Diagnosis: Thoracic compression fracture (H) [S22.000A]    Procedure(s):  Open Thoracic 7-Thoracic 12 POSTERIOR FUSION with REVISION OF INSTRUMENTATION FROM Thoracic 7-Sacral 1    Past Medical History:   Diagnosis Date     Acute Crohn's disease (H)      Complication of anesthesia     needs nebs before surgery     COPD (chronic obstructive pulmonary disease) (H)      Drug overdose      Hypertension      Noninfectious ileitis     Chrons     Other chronic pain     back     PTSD (post-traumatic stress disorder)      Seizures (H)     last seizure three years ago     Sleep apnea     not tested but thinks she has it     Uncomplicated asthma      Past Surgical History:   Procedure Laterality Date     ABDOMEN SURGERY      4 c-sections     BACK SURGERY      4 back surgeries     DECOMPRESSION, FUSION LUMBAR POSTERIOR THREE + LEVELS, COMBINED N/A 2019    Procedure: 1.  Removal of prior instrumentation, L3, L4, L5.  2.  Exploration of spinal fusion.  3.  Repeat posterolateral fusion at L3-L4.  4.  Posterior fusion, T11-T12, L1, L2, L3.  5.  Segmental instrumentation T11-12, L2, L2, L3 L4, L5 and sacrum using iliac screws.;  Surgeon: Christian Love MD;  Location:  OR     DECOMPRESSION, FUSION LUMBAR POSTERIOR TWO LEVELS, COMBINED N/A 2019    Procedure: L3 - L4  oblique lateral lumbar interbody fusion  L3 Posterior minimally invasive pedicle screw placement and posterolateral instrumentation L3 to L4  and  Posterior fusion  cut off old maine below L4 and remove old screws at L4 bilateral and replacement;  Surgeon: Christian Love MD;  Location:  OR     EXPLORE SPINE, REMOVE HARDWARE, COMBINED N/A 2019    Procedure: Explore spine, remove hardware, combined;  Surgeon: Christian Love MD;  Location:  OR     GI SURGERY      bowel resection for chrons     ORTHOPEDIC SURGERY Bilateral     knee  "replacement     Anesthesia Evaluation     . Pt has had prior anesthetic. Type: General    No history of anesthetic complications          ROS/MED HX    ENT/Pulmonary:     (+)sleep apnea, asthma COPD, , . .    Neurologic:  - neg neurologic ROS     Cardiovascular:     (+) hypertension----. : . . . :. .       METS/Exercise Tolerance:     Hematologic:  - neg hematologic  ROS       Musculoskeletal:   (+) arthritis,  -       GI/Hepatic:  - neg GI/hepatic ROS       Renal/Genitourinary:  - ROS Renal section negative       Endo:     (+) Obesity, .      Psychiatric:     (+) psychiatric history anxiety and depression      Infectious Disease:  - neg infectious disease ROS       Malignancy:         Other:    (+) H/O Chronic Pain,                        Physical Exam  Normal systems: cardiovascular and pulmonary    Airway   Mallampati: II  TM distance: >3 FB  Neck ROM: full    Dental     Cardiovascular       Pulmonary             Lab Results   Component Value Date    HGB 9.6 (L) 09/14/2019     09/13/2019    POTASSIUM 4.4 09/13/2019    CHLORIDE 107 09/13/2019    CO2 30 09/13/2019    BUN 18 09/13/2019    CR 0.54 09/13/2019     (H) 09/14/2019    CHARLES 8.0 (L) 09/13/2019       Preop Vitals  BP Readings from Last 3 Encounters:   07/30/20 (!) 128/91   09/15/19 122/80   01/18/19 109/68    Pulse Readings from Last 3 Encounters:   09/15/19 78   01/17/19 85      Resp Readings from Last 3 Encounters:   07/30/20 20   09/15/19 14   01/18/19 16    SpO2 Readings from Last 3 Encounters:   07/30/20 96%   09/15/19 95%   01/18/19 96%      Temp Readings from Last 1 Encounters:   07/30/20 97.7  F (36.5  C) (Temporal)    Ht Readings from Last 1 Encounters:   07/30/20 1.6 m (5' 3\")      Wt Readings from Last 1 Encounters:   07/30/20 94.3 kg (208 lb)    Estimated body mass index is 36.85 kg/m  as calculated from the following:    Height as of this encounter: 1.6 m (5' 3\").    Weight as of this encounter: 94.3 kg (208 lb).       Anesthesia " Plan      History & Physical Review  History and physical reviewed and following examination; no interval change.    ASA Status:  3 .    NPO Status:  > 8 hours    Plan for General with Intravenous induction. Maintenance will be Balanced.    PONV prophylaxis:  Ondansetron (or other 5HT-3) and Dexamethasone or Solumedrol  Additional equipment: Arterial Line   The patient is not a current smoker      Postoperative Care  Postoperative pain management:  IV analgesics, Oral pain medications and Multi-modal analgesia (Methadone, precedex, ketamine).      Consents  Anesthetic plan, risks, benefits and alternatives discussed with:  Patient..                 Rancho Garza MD                    .

## 2020-07-30 NOTE — PROGRESS NOTES
SPIRITUAL HEALTH SERVICES  [facility]  PRE-SURGERY VISIT    Pre-surgical visit with pt.  Provided spiritual support, prayer.   Oriented to Spiritual Health Services and availability for emotional/spiritual support during hospital stay.         Walker Mathew MA  Staff   Pager: [phone]  Phone: [phone]

## 2020-07-30 NOTE — BRIEF OP NOTE
[facility] Brief Operative Note    Pre-operative diagnosis: Thoracic compression fracture (H) [S22.000A]   Post-operative diagnosis thoracolumbar kyphosis proximal junctional kyphosis compression fracture T11     Procedure: Procedure(s):  Open Thoracic 7-Thoracic 12 POSTERIOR FUSION with REVISION OF INSTRUMENTATION FROM Thoracic 7-Sacral 1   Surgeon(s): Surgeon(s) and Role:     * Christian Love MD - Primary     * Josselin Ryan PA-C - Assisting   Estimated blood loss: 200 mL    Specimens: * No specimens in log *   Findings: See op note

## 2020-07-30 NOTE — OR NURSING
During the procedure Dr. Love explanted 7 screws, 7 set screws, and parts of 2 rods. Portions of the rods remained implanted in the patient.

## 2020-07-31 ENCOUNTER — APPOINTMENT (OUTPATIENT)
Dept: PHYSICAL THERAPY | Facility: CLINIC | Age: 60
DRG: 457 | End: 2020-07-31
Attending: PHYSICIAN ASSISTANT
Payer: COMMERCIAL

## 2020-07-31 ENCOUNTER — APPOINTMENT (OUTPATIENT)
Dept: OCCUPATIONAL THERAPY | Facility: CLINIC | Age: 60
DRG: 457 | End: 2020-07-31
Attending: PHYSICIAN ASSISTANT
Payer: COMMERCIAL

## 2020-07-31 LAB
ANION GAP SERPL CALCULATED.3IONS-SCNC: 2 MMOL/L (ref 3–14)
BUN SERPL-MCNC: 9 MG/DL (ref 7–30)
CALCIUM SERPL-MCNC: 7.9 MG/DL (ref 8.5–10.1)
CHLORIDE SERPL-SCNC: 103 MMOL/L (ref 94–109)
CO2 SERPL-SCNC: 32 MMOL/L (ref 20–32)
CREAT SERPL-MCNC: 0.54 MG/DL (ref 0.52–1.04)
GFR SERPL CREATININE-BSD FRML MDRD: >90 ML/MIN/{1.73_M2}
GLUCOSE SERPL-MCNC: 120 MG/DL (ref 70–99)
HGB BLD-MCNC: 10.1 G/DL (ref 11.7–15.7)
POTASSIUM SERPL-SCNC: 3.9 MMOL/L (ref 3.4–5.3)
SODIUM SERPL-SCNC: 137 MMOL/L (ref 133–144)

## 2020-07-31 PROCEDURE — 80048 BASIC METABOLIC PNL TOTAL CA: CPT | Performed by: PHYSICIAN ASSISTANT

## 2020-07-31 PROCEDURE — 97535 SELF CARE MNGMENT TRAINING: CPT | Mod: GO | Performed by: REHABILITATION PRACTITIONER

## 2020-07-31 PROCEDURE — 97116 GAIT TRAINING THERAPY: CPT | Mod: GP | Performed by: PHYSICAL THERAPIST

## 2020-07-31 PROCEDURE — 25000132 ZZH RX MED GY IP 250 OP 250 PS 637: Performed by: CLINICAL NURSE SPECIALIST

## 2020-07-31 PROCEDURE — 36415 COLL VENOUS BLD VENIPUNCTURE: CPT | Performed by: PHYSICIAN ASSISTANT

## 2020-07-31 PROCEDURE — 85018 HEMOGLOBIN: CPT | Performed by: PHYSICIAN ASSISTANT

## 2020-07-31 PROCEDURE — 97116 GAIT TRAINING THERAPY: CPT | Mod: GP | Performed by: PHYSICAL THERAPY ASSISTANT

## 2020-07-31 PROCEDURE — 97530 THERAPEUTIC ACTIVITIES: CPT | Mod: GP | Performed by: PHYSICAL THERAPY ASSISTANT

## 2020-07-31 PROCEDURE — 12000000 ZZH R&B MED SURG/OB

## 2020-07-31 PROCEDURE — 99222 1ST HOSP IP/OBS MODERATE 55: CPT | Performed by: PHYSICIAN ASSISTANT

## 2020-07-31 PROCEDURE — 25000128 H RX IP 250 OP 636: Performed by: PHYSICIAN ASSISTANT

## 2020-07-31 PROCEDURE — 99207 ZZC CONSULT E&M CHANGED TO INITIAL LEVEL: CPT | Performed by: PHYSICIAN ASSISTANT

## 2020-07-31 PROCEDURE — 97530 THERAPEUTIC ACTIVITIES: CPT | Mod: GP | Performed by: PHYSICAL THERAPIST

## 2020-07-31 PROCEDURE — 97161 PT EVAL LOW COMPLEX 20 MIN: CPT | Mod: GP | Performed by: PHYSICAL THERAPIST

## 2020-07-31 PROCEDURE — 99232 SBSQ HOSP IP/OBS MODERATE 35: CPT | Performed by: CLINICAL NURSE SPECIALIST

## 2020-07-31 PROCEDURE — 97165 OT EVAL LOW COMPLEX 30 MIN: CPT | Mod: GO | Performed by: REHABILITATION PRACTITIONER

## 2020-07-31 PROCEDURE — 25000132 ZZH RX MED GY IP 250 OP 250 PS 637: Performed by: PHYSICIAN ASSISTANT

## 2020-07-31 RX ORDER — OXYCODONE HYDROCHLORIDE 5 MG/1
5 TABLET ORAL ONCE
Status: COMPLETED | OUTPATIENT
Start: 2020-07-31 | End: 2020-07-31

## 2020-07-31 RX ORDER — OXYCODONE HYDROCHLORIDE 5 MG/1
10-20 TABLET ORAL
Status: DISCONTINUED | OUTPATIENT
Start: 2020-07-31 | End: 2020-08-01 | Stop reason: HOSPADM

## 2020-07-31 RX ADMIN — ALBUTEROL SULFATE 2 PUFF: 90 AEROSOL, METERED RESPIRATORY (INHALATION) at 12:19

## 2020-07-31 RX ADMIN — ACETAMINOPHEN 975 MG: 325 TABLET, FILM COATED ORAL at 17:22

## 2020-07-31 RX ADMIN — KETOROLAC TROMETHAMINE 30 MG: 30 INJECTION, SOLUTION INTRAMUSCULAR at 15:49

## 2020-07-31 RX ADMIN — OXYCODONE HYDROCHLORIDE 15 MG: 5 TABLET ORAL at 18:00

## 2020-07-31 RX ADMIN — CARISOPRODOL 350 MG: 350 TABLET ORAL at 00:55

## 2020-07-31 RX ADMIN — DOCUSATE SODIUM AND SENNOSIDES 2 TABLET: 8.6; 5 TABLET ORAL at 21:32

## 2020-07-31 RX ADMIN — FAMOTIDINE 20 MG: 20 TABLET ORAL at 21:32

## 2020-07-31 RX ADMIN — KETOROLAC TROMETHAMINE 30 MG: 30 INJECTION, SOLUTION INTRAMUSCULAR at 10:22

## 2020-07-31 RX ADMIN — FAMOTIDINE 20 MG: 20 TABLET ORAL at 08:27

## 2020-07-31 RX ADMIN — GABAPENTIN 600 MG: 300 CAPSULE ORAL at 21:32

## 2020-07-31 RX ADMIN — ACETAMINOPHEN 975 MG: 325 TABLET, FILM COATED ORAL at 00:55

## 2020-07-31 RX ADMIN — HYDROXYZINE HYDROCHLORIDE 25 MG: 25 TABLET ORAL at 17:22

## 2020-07-31 RX ADMIN — ALBUTEROL SULFATE 2 PUFF: 90 AEROSOL, METERED RESPIRATORY (INHALATION) at 05:38

## 2020-07-31 RX ADMIN — KETOROLAC TROMETHAMINE 30 MG: 30 INJECTION, SOLUTION INTRAMUSCULAR at 03:52

## 2020-07-31 RX ADMIN — LEVETIRACETAM 500 MG: 500 TABLET ORAL at 08:29

## 2020-07-31 RX ADMIN — HYDROXYZINE HYDROCHLORIDE 25 MG: 25 TABLET ORAL at 10:49

## 2020-07-31 RX ADMIN — CITALOPRAM HYDROBROMIDE 30 MG: 20 TABLET ORAL at 08:29

## 2020-07-31 RX ADMIN — OXYCODONE HYDROCHLORIDE 10 MG: 5 TABLET ORAL at 08:29

## 2020-07-31 RX ADMIN — OXYCODONE HYDROCHLORIDE 15 MG: 5 TABLET ORAL at 21:37

## 2020-07-31 RX ADMIN — CEFAZOLIN SODIUM 1 G: 1 INJECTION, SOLUTION INTRAVENOUS at 03:52

## 2020-07-31 RX ADMIN — CARISOPRODOL 350 MG: 350 TABLET ORAL at 08:40

## 2020-07-31 RX ADMIN — DOCUSATE SODIUM AND SENNOSIDES 2 TABLET: 8.6; 5 TABLET ORAL at 08:27

## 2020-07-31 RX ADMIN — LEVETIRACETAM 1000 MG: 500 TABLET, FILM COATED ORAL at 21:32

## 2020-07-31 RX ADMIN — OXYCODONE HYDROCHLORIDE 10 MG: 5 TABLET ORAL at 11:35

## 2020-07-31 RX ADMIN — GABAPENTIN 600 MG: 300 CAPSULE ORAL at 08:27

## 2020-07-31 RX ADMIN — OXYCODONE HYDROCHLORIDE 10 MG: 5 TABLET ORAL at 15:03

## 2020-07-31 RX ADMIN — OXYCODONE HYDROCHLORIDE 10 MG: 5 TABLET ORAL at 05:29

## 2020-07-31 RX ADMIN — CARISOPRODOL 350 MG: 350 TABLET ORAL at 15:49

## 2020-07-31 RX ADMIN — ACETAMINOPHEN 975 MG: 325 TABLET, FILM COATED ORAL at 08:27

## 2020-07-31 RX ADMIN — OXYCODONE HYDROCHLORIDE 10 MG: 5 TABLET ORAL at 02:17

## 2020-07-31 RX ADMIN — ALBUTEROL SULFATE 2 PUFF: 90 AEROSOL, METERED RESPIRATORY (INHALATION) at 21:31

## 2020-07-31 RX ADMIN — OXYCODONE HYDROCHLORIDE 5 MG: 5 TABLET ORAL at 21:48

## 2020-07-31 ASSESSMENT — ACTIVITIES OF DAILY LIVING (ADL)
ADLS_ACUITY_SCORE: 12
BATHING: 0-->INDEPENDENT
COGNITION: 0 - NO COGNITION ISSUES REPORTED
DRESS: 0-->INDEPENDENT
ADLS_ACUITY_SCORE: 12
WHICH_OF_THE_ABOVE_FUNCTIONAL_RISKS_HAD_A_RECENT_ONSET_OR_CHANGE?: AMBULATION
TOILETING: 0-->INDEPENDENT
RETIRED_EATING: 0-->INDEPENDENT
TRANSFERRING: 0-->INDEPENDENT
ADLS_ACUITY_SCORE: 12
AMBULATION: 1-->ASSISTIVE EQUIPMENT
SWALLOWING: 0-->SWALLOWS FOODS/LIQUIDS WITHOUT DIFFICULTY
RETIRED_COMMUNICATION: 0-->UNDERSTANDS/COMMUNICATES WITHOUT DIFFICULTY
FALL_HISTORY_WITHIN_LAST_SIX_MONTHS: NO
ADLS_ACUITY_SCORE: 12

## 2020-07-31 NOTE — PLAN OF CARE
DAy RN  Prasad,CMS+ stated pain in mid to lower back stated goes down her legs on and off like before surgery.   High pain ratings today appears to dfeal with pain well. Upbeat and able to rest and visit with sister in room. Tolerated walking in [place]endoza with pt and up to chair for meals with SBA gait belt brace and walker  Pain controlled with oxycodone, vistaril, soma, tylenol and ice.   Awake most of shift.   Repositioned with min assist follows spine precautions  Moderate to large amount of dried drainage on back dressing then leaked out bottom so sterile dressing change was done, about 50 staples incision has minor swelling but otherwise WDL  Ate and drank well. Passing gas   Dimas discontinued today DTV  Discharge to home tomorrow per dr Love

## 2020-07-31 NOTE — PLAN OF CARE
Admitting Diagnosis: T7-12 Posterior Fusion  Pertinent History: Lumbar Stenosis, PTSD, HTN, Depression, Seizures  Living Situation: Home  Pain plan: PO Oxycodone, Soma, & Tylenol. IV Toradol.  Mobility: assistance, 2 people  Baseline activity: Independent  Alarms/Safety: Bed alarm, Capnography  LDA's: PIV & Dimas  Consults: Pain team, Spiritual Health, OT, PT, SW.   Other Cares/Comments: Pt slept well in between cares. Dressing CDI. CMS intact.   Discharge Disposition: TBD- pt prefers to go home

## 2020-07-31 NOTE — PLAN OF CARE
Evening RN    Pt arrived to floor from PACU around 1635.  Transferred from cart to bed with hovermat and A3 - tolerated ok.  VSS and afebrile.  Rates pain a 10/10 and wants pain medication intervention when available.  Orientated to room, call light, and staff.  Will continue to monitor.    Pt A/O x4.  Sleepy at beginning of shift but very easy to arouse, been alert rest of evening.  VSS and afebrile - slightly higher BP's during periods of extreme pain.  Pain managed with scheduled PO Tylenol and Gabapentin, PRN PO oxycodone (5-10mg) and IV dilaudid (0.5 to 1mg) - working to determine ideal pain management schedule as she has been very painful tonight.  Improved to a 7 by end of shift.  CMS intact.  Dressing small amount of shadowed drainage by end of shift.  Not oob yet due to pain - repositioning in bed quite frequently per patient prerogative and with staff assist.  Dimas catheter in place and wdl - till PO1.  Tolerating full liquid diet well.  Plan is home on discharge.  Will continue to monitor.

## 2020-07-31 NOTE — PROGRESS NOTES
07/31/20 0953   Quick Adds   Type of Visit Initial PT Evaluation   Living Environment   Lives With spouse   Living Arrangements house   Home Accessibility other (see comments)  (ramp entry)   Living Environment Comment Pt will be staying with sister in 1 level home with ramp entry. Sister has a walk in shower with seat.    Self-Care   Equipment Currently Used at Home cane, straight;walker, rolling;shower chair   Activity/Exercise/Self-Care Comment Pt was using a cane prior to surgery and 4WW at times   Functional Level Prior   Ambulation 1-->assistive equipment   Transferring 0-->independent   Toileting 0-->independent   Bathing 0-->independent   Communication 0-->understands/communicates without difficulty   Swallowing 0-->swallows foods/liquids without difficulty   Cognition 0 - no cognition issues reported   Fall history within last six months no   General Information   Onset of Illness/Injury or Date of Surgery - Date 07/30/20   Referring Physician Dr. Christian Love   Patient/Family Goals Statement Pt is hopeful to DC to sisters house   Pertinent History of Current Problem (include personal factors and/or comorbidities that impact the POC) Josselin Cunha is a 59 year old female with a history of Asthma, Crohn's Disease, COPD, HTN, Seizure Disorder, ULISES, PTSD, Depression and Thoracic Compression Fracture who is admitted s/p Open Thoracic 7-Thoracic 12 Posterior Fusion with Revision of instrumentation from Thoracic 7-Sacral 1.  Internal Medicine service was asked to see for management of chronic medical problems.    Precautions/Limitations spinal precautions   Cognitive Status Examination   Orientation orientation to person, place and time   Level of Consciousness alert   Follows Commands and Answers Questions 100% of the time;able to follow multistep instructions   Personal Safety and Judgment intact   Memory intact   Pain Assessment   Patient Currently in Pain Yes, see Vital Sign flowsheet   Posture    Posture  "Forward head position;Protracted shoulders   Range of Motion (ROM)   ROM Comment spinal prec limiting lumbar and thoracic mobility   Strength   Strength Comments functional weakness, but B LE strength intact   Bed Mobility   Bed Mobility Comments CGA   Transfer Skills   Transfer Comments CGA   Gait   Gait Comments CGA with FWW   Balance   Balance Comments no LOB   Modality Interventions   Planned Modality Interventions Cryotherapy   General Therapy Interventions   Planned Therapy Interventions balance training;bed mobility training;gait training;strengthening;transfer training;risk factor education;home program guidelines;progressive activity/exercise   Clinical Impression   Criteria for Skilled Therapeutic Intervention yes, treatment indicated   PT Diagnosis decreased functional mobility status post spine surgery   Influenced by the following impairments pain, decreased strength, decreased ROM, spinal precautions   Functional limitations due to impairments decreased bed mob, decreased transfers, decreased ambulation   Clinical Presentation Stable/Uncomplicated   Clinical Presentation Rationale improving   Clinical Decision Making (Complexity) Low complexity   Therapy Frequency 2x/day   Predicted Duration of Therapy Intervention (days/wks) 2 days   Anticipated Discharge Disposition Home with Assist   Risk & Benefits of therapy have been explained Yes   Patient, Family & other staff in agreement with plan of care Yes   [employer] TM \"6 Clicks\"   2016, Trustees of [employer], under license to [employer].  All rights reserved.   6 Clicks Short Forms Basic Mobility Inpatient Short Form   [employer]  \"6 Clicks\" V.2 Basic Mobility Inpatient Short Form   1. Turning from your back to your side while in a flat bed without using bedrails? 3 - A Little   2. Moving from lying on your back to sitting on the side of a flat bed without using bedrails? 3 - A Little   3. Moving to and from a bed " to a chair (including a wheelchair)? 3 - A Little   4. Standing up from a chair using your arms (e.g., wheelchair, or bedside chair)? 3 - A Little   5. To walk in hospital room? 3 - A Little   6. Climbing 3-5 steps with a railing? 3 - A Little   Basic Mobility Raw Score (Score out of 24.Lower scores equate to lower levels of function) 18   Total Evaluation Time   Total Evaluation Time (Minutes) 10

## 2020-07-31 NOTE — PROVIDER NOTIFICATION
the patient is requesting you to come see her regarding the pain meds. she plans on discharging tomorrow am. thanks randi

## 2020-07-31 NOTE — PROGRESS NOTES
"    [facility]  Pain Management Progress Note  Text Page     Assessment & Plan   Josseiln Cunha is a 59 year old female who was admitted on 7/30/2020.     Met with the patient as she was sitting up in a chair. Her sister, Billie at bedside whom she will dismiss to her home. Patient acknowledged difficulty with current pain plan. \"When I found out they put 50 staples in my back I knew why it hurt so much\" Reasonable to liberalize opiate while hospitalized.      1)  Acute thoracic pain s/p T7-12 fusion per Christian Love MD yesterday.  History  - L1-L3 Transforaminal or Oblique Interbody Fusion on 9/12/2019 by Christian Love MD  - L3-4 Oblique lumbar interbody fusion and posterolateral fusion and revision instrumentation L3-L5 on 1/17/2019 by Christian Love MD     2)  Patient with chronic back and leg pain, on chronic opioid therapy managed by Christian Love MD  Baseline 30 mg Daily Morphine Equivalent as dispensed as reported daily use.  Patient has a possible opioid tolerance.      Patient's opioid use in past 24 hours: 75 mg Oxycodone  = equal analgesic to 112.5 mg oral Morphine     3)  Risk factors for opioid related harms  -History of overdose  -Sleep disordered breathing  -Anxiety/depression  -Opioid has recently been changed     4)  Opioid induced side-effects:  -Constipation - No/denies  -Nausea/Vomit - No/denies  -Sedation - No/denies    -Urinary Retention - kay catheter     5)  Other/Related:  -Rosy's disease   -Seizure disorder    -COPD and Asthma   -Depression/anxiety - PTSD  -Deconditioning   -She has not had sleep study, but snores loudly would question Sleep Apnea     PLAN:   1)  Reasonable to liberalize oxycodone to 10-20 mg every 3 hours prn. Continue capnography at night.      2)  Non-opioid multimodal medication therapy  -Topical:  Not indicated   -N-SAIDS:  Ketorolac 30 mg every 6 hour per surgical orders  -Muscle Relaxants: Soma 350 mg TID PRN  -Adjuvants:  Acetaminophen 975 mg TID, " Gabapentin 600 mg BID, Atarax 25 mg every 6 hours prn   -Antidepressants/anxiolytics:  Chronic Citalopram 30 mg Daily  History seizure disorder on Keppra 500 mg morning and 1,000 evening     3)  Non-medication interventions  Positioning, ICE, Relaxation, Meditation, Distraction  Appreciate input of Occupational Therapist Cristy Yan, HERB and Physical Therapist Rina Champagne. PT and Cristy Leblanc PTA.     4)  Opioids: Oxycodone 10-20 mg every 3 hours PRN and IV Dilaudid for severe pain   Opioids Treatment Goal: -Improvement in function  -Participate in PT  -Post-operative management of pain, expected 3-7 days needed     5)  Constipation Prophylaxis  Senna-docusate 1-2 tablets BID Continue to Monitor, Bowel Meds PRN per Constipation Order Set     6)  Pain Education  -Opioid safe use, storage and disposal information included in DC AVS     7)  DC Planning   Discussed goal of opioid therapy as above with patient, sister and bedside nurse Lorraine Mcneill RN  Length of therapy is less than 10 days, opioids may be stopped without taper.  Continued outpatient management of pain per Christian Love MD   Disposition: TBD  Support systems: Billie Keen  Outpatient Referrals: None needed from pain perspective  The following risk factors have been identified for unintentional overdose: patient is on multiple sedating medications  and patient has anxiety, depression or PTSD. Discharge with intra-nasal naloxone if discharged to home with opioids  >40 mg MME/day.  Plan for education prior to discharge.    Marina Gustafson MS, RN, CNS, APRN, ACHPN, FAACVPR  Pain and Palliative Care  Pager [phone]  Office [phone]       Time Spent on this Encounter   Total unit/floor time 4:35 PM until 5 PM  minutes, time consisted of the following, examination of the patient, reviewing the record and completing documentation. >50% of time spent in counseling and coordination of care.  Time spend counseling with patient and family consisted of  the following topics, symptom management.  Time spent in coordination of care with Bedside Nurse Lorraine Hernandez RN.       Interval History   Chart reviewed - patient progressing well post-op and neuro intact    Review of Systems   CONSTITUTIONAL: NEGATIVE for fever, chills, change in weight  ENT/MOUTH: NEGATIVE for ear, mouth and throat problems  RESP: NEGATIVE for significant cough or SOB  CV: NEGATIVE for chest pain, palpitations or peripheral edema    Physical Exam   Temp:  [97.3  F (36.3  C)-98.5  F (36.9  C)] 98.3  F (36.8  C)  Pulse:  [66-95] 95  Heart Rate:  [69-84] 84  Resp:  [12-16] 14  BP: (117-157)/() 132/86  SpO2:  [93 %-99 %] 96 %  208 lbs 0 oz  GEN:  Alert, oriented x 3, appears comfortable, NAD.  HEENT:  Normocephalic/atraumatic, no scleral icterus, no nasal discharge, mouth moist.  RESP:   Symmetric chest rise on inhalation noted.  Normal respiratory effort.  PAIN BEHAVIOR: Cooperative  Psych:  Normal affect.  Calm, cooperative, conversant appropriately.    Medications     0.9% sodium chloride + KCl 20 mEq/L 100 mL/hr at 07/30/20 2012       acetaminophen  975 mg Oral Q8H     citalopram  30 mg Oral Daily     famotidine  20 mg Oral BID    Or     famotidine  20 mg Intravenous BID     gabapentin  600 mg Oral BID     levETIRAcetam  1,000 mg Oral QPM     levETIRAcetam  500 mg Oral QAM     senna-docusate  1 tablet Oral BID    Or     senna-docusate  2 tablet Oral BID     sodium chloride (PF)  3 mL Intracatheter Q8H       Data   Results for orders placed or performed during the hospital encounter of 07/30/20 (from the past 24 hour(s))   Basic metabolic panel   Result Value Ref Range    Sodium 137 133 - 144 mmol/L    Potassium 3.9 3.4 - 5.3 mmol/L    Chloride 103 94 - 109 mmol/L    Carbon Dioxide 32 20 - 32 mmol/L    Anion Gap 2 (L) 3 - 14 mmol/L    Glucose 120 (H) 70 - 99 mg/dL    Urea Nitrogen 9 7 - 30 mg/dL    Creatinine 0.54 0.52 - 1.04 mg/dL    GFR Estimate >90 >60 mL/min/[1.73_m2]    GFR Estimate  If Black >90 >60 mL/min/[1.73_m2]    Calcium 7.9 (L) 8.5 - 10.1 mg/dL   Hemoglobin   Result Value Ref Range    Hemoglobin 10.1 (L) 11.7 - 15.7 g/dL

## 2020-07-31 NOTE — CONSULTS
[facility] Hospitalist Consult     Josselin Cunha MRN# [id number]   YOB: 1960 Age: 59 year old   Date of Admission: 7/30/2020  PCP is Benny Bernal  Date of Service: 7/31/2020    Referring MD & Reason for Visit: I was asked by Christian Love MD, to manage chronic medical problems.  Internal Medicine Physician Assistant: Judy Yoon PA-C         Assessment and Plan:   Josselin Cunha is a 59 year old female with a history of Asthma, Crohn's Disease, COPD, HTN, Seizure Disorder, ULISES, PTSD, Depression and Thoracic Compression Fracture who is admitted s/p Open Thoracic 7-Thoracic 12 Posterior Fusion with Revision of instrumentation from Thoracic 7-Sacral 1.  Internal Medicine service was asked to see for management of chronic medical problems.     Proximal junctional kyphosis, thoracolumbar kyphosis, compression fracture at T12, T11 area s/p T7 through T12 posterolateral fusion with Revision instrumentation T7 through sacrum - POD #1. Pt reports pain is not well controlled.  Hgb 10.1    - will defer diet, activity, DVT ppx, and pain control to primary team.   - Pain Team Service following    Asthma/COPD - no acute exacerbation.    - continue pta Albuterol prn    Epilepsy - diagnosed 14 years ago.  Last seizure 4 years ago.  - continue pta Keppra    Depression/PTSD - continue pta Celexa    Crohn's Disease - hx of colon resection 7 years ago.  Not currently on any medical therapy.  No recent flares.      CODE: Full  Diet/IVF: Regaulr  GI ppx:  Pepcid  DVT ppx: scd      Jduy Yoon MS, PA-C  Internal Medicine Physician Assistant  [facility]  Pager: [phone]           Chief Complaint:   Back Pain         HPI:   History is obtained from the patient and medical record. This patient is a 59 year old female with a history of Asthma, Crohn's Disease, COPD, HTN, Seizure Disorder, PTSD, Depression and Thoracic Compression Fracture who is admitted s/p Open Thoracic 7-Thoracic 12  Posterior Fusion with Revision of instrumentation from Thoracic 7-Sacral 1.  Internal Medicine service was asked to see for management of chronic medical problems.     Patient reports back pain is not well controlled.  Denies chest pain, shortness of breath, wheezing, abdominal pain, nausea, emesis.  Reports last seizure was 4 years ago.          Past Medical History:     Past Medical History:   Diagnosis Date     Acute Crohn's disease (H)      Complication of anesthesia     needs nebs before surgery     COPD (chronic obstructive pulmonary disease) (H)      Drug overdose      Hypertension      Noninfectious ileitis     Chrons     Other chronic pain     back     PTSD (post-traumatic stress disorder)      Seizures (H)     last seizure three years ago     Sleep apnea     not tested but thinks she has it     Uncomplicated asthma           Past Surgical History:     Past Surgical History:   Procedure Laterality Date     ABDOMEN SURGERY      4 c-sections     BACK SURGERY      4 back surgeries     DECOMPRESSION, FUSION LUMBAR POSTERIOR THREE + LEVELS, COMBINED N/A 9/12/2019    Procedure: 1.  Removal of prior instrumentation, L3, L4, L5.  2.  Exploration of spinal fusion.  3.  Repeat posterolateral fusion at L3-L4.  4.  Posterior fusion, T11-T12, L1, L2, L3.  5.  Segmental instrumentation T11-12, L2, L2, L3 L4, L5 and sacrum using iliac screws.;  Surgeon: Christian Love MD;  Location: [facility] OR     DECOMPRESSION, FUSION LUMBAR POSTERIOR TWO LEVELS, COMBINED N/A 1/17/2019    Procedure: L3 - L4  oblique lateral lumbar interbody fusion  L3 Posterior minimally invasive pedicle screw placement and posterolateral instrumentation L3 to L4  and  Posterior fusion  cut off old maine below L4 and remove old screws at L4 bilateral and replacement;  Surgeon: Christian Love MD;  Location:  OR     EXPLORE SPINE, REMOVE HARDWARE, COMBINED N/A 9/12/2019    Procedure: Explore spine, remove hardware, combined;  Surgeon: Christian Love MD;   Location: [facility] OR     GI SURGERY      bowel resection for chrons     ORTHOPEDIC SURGERY Bilateral     knee replacement          Social History:     Social History     Socioeconomic History     Marital status:      Spouse name: Not on file     Number of children: Not on file     Years of education: Not on file     Highest education level: Not on file   Occupational History     Not on file   Social Needs     Financial resource strain: Not on file     Food insecurity     Worry: Not on file     Inability: Not on file     Transportation needs     Medical: Not on file     Non-medical: Not on file   Tobacco Use     Smoking status: Former Smoker     Last attempt to quit: 2018     Years since quittin.7     Smokeless tobacco: Never Used   Substance and Sexual Activity     Alcohol use: No     Frequency: Never     Drug use: No     Sexual activity: Not on file   Lifestyle     Physical activity     Days per week: Not on file     Minutes per session: Not on file     Stress: Not on file   Relationships     Social connections     Talks on phone: Not on file     Gets together: Not on file     Attends Hinduism service: Not on file     Active member of club or organization: Not on file     Attends meetings of clubs or organizations: Not on file     Relationship status: Not on file     Intimate partner violence     Fear of current or ex partner: Not on file     Emotionally abused: Not on file     Physically abused: Not on file     Forced sexual activity: Not on file   Other Topics Concern     Not on file   Social History Narrative     Not on file          Family History:     Family History   Problem Relation Age of Onset     Diabetes Mother      Atrial fibrillation Mother      Diabetes Father           Allergies:      Allergies   Allergen Reactions     Tramadol      Instructed not to take per neurologist exacerbates seizures          Medications:     Prior to Admission medications    Medication Sig Last Dose Taking?  "Auth Provider   albuterol (PROAIR HFA/PROVENTIL HFA/VENTOLIN HFA) 108 (90 Base) MCG/ACT inhaler Inhale 2 puffs into the lungs every 4 hours as needed for shortness of breath / dyspnea or wheezing  Yes Reported, Patient   citalopram (CELEXA) 20 MG tablet Take 30 mg by mouth daily Past Week at Unknown time Yes Unknown, Entered By History   gabapentin (NEURONTIN) 300 MG capsule Take 600 mg by mouth 2 times daily Takes 2 capsules in the morning and 2 capsules in the evening  7/29/2020 at Unknown time Yes Reported, Patient   levETIRAcetam (KEPPRA) 1000 MG tablet Take 1,000 mg by mouth every evening  7/29/2020 at Unknown time Yes Reported, Patient   levETIRAcetam (KEPPRA) 500 MG tablet Take 500 mg by mouth every morning 7/30/2020 at Unknown time Yes Unknown, Entered By History   oxyCODONE-acetaminophen (PERCOCET)  MG per tablet Take 1 tablet by mouth every 6 hours as needed for severe pain  Yes Christian Love MD   SUMAtriptan (IMITREX) 100 MG tablet Take 100 mg by mouth at onset of headache for migraine More than a month at Unknown time  Reported, Patient          Review of Systems:   A complete ROS was performed and is negative other than what is stated in the HPI.       Physical Exam:   Blood pressure 120/79, pulse 95, temperature 97.3  F (36.3  C), temperature source Temporal, resp. rate 16, height 1.6 m (5' 3\"), weight 94.3 kg (208 lb), SpO2 96 %.  General: Alert, interactive, NAD, sitting up at the side of the bed, pleasant and cooperative.  HEENT: AT/NC, sclera anicteric, PERRL, EOMI  Chest/Resp: clear to auscultation bilaterally, no crackles or wheezes  Heart/CV: regular rate and rhythm, no murmur  Abdomen/GI: Soft, nontender, nondistended. +BS.  No HSM or masses, no rebound or guarding.  Extremities/MSK: No LE edema.  Back bandage c/d/i.  FROM testing not performed.  Skin: Warm and dry  Neuro: Alert & oriented x 3, sensation of BLE grossly intact         Labs:     CMP  Recent Labs   Lab 07/31/20  0556   NA " 137   POTASSIUM 3.9   CHLORIDE 103   CO2 32   ANIONGAP 2*   *   BUN 9   CR 0.54   GFRESTIMATED >90   GFRESTBLACK >90   CHARLES 7.9*     CBC  Recent Labs   Lab 07/31/20  0556 07/30/20  0704   HGB 10.1* 11.5*

## 2020-07-31 NOTE — PROGRESS NOTES
07/31/20 1425   Quick Adds   Type of Visit Initial Occupational Therapy Evaluation   Living Environment   Lives With spouse   Living Arrangements house   Transportation Anticipated family or friend will provide   Living Environment Comment Pt will be staying with sister in 1 level home with ramp entry. Sister has a walk in shower with seat   Self-Care   Equipment Currently Used at Home cane, straight;shower chair;walker, rolling   Activity/Exercise/Self-Care Comment Pt was using a cane prior to surgery and 4WW at times    Functional Level   Ambulation 1-->assistive equipment   Transferring 0-->independent   Toileting 0-->independent   Bathing 0-->independent   Dressing 0-->independent   Eating 0-->independent   Communication 0-->understands/communicates without difficulty   Swallowing 0-->swallows foods/liquids without difficulty   Cognition 0 - no cognition issues reported   Fall history within last six months no   General Information   Onset of Illness/Injury or Date of Surgery - Date 07/30/20   Referring Physician Dr. Love   Patient/Family Goals Statement to return home   Additional Occupational Profile Info/Pertinent History of Current Problem Josselin Cunha is a 59 year old female with a history of Asthma, Crohn's Disease, COPD, HTN, Seizure Disorder, ULISES, PTSD, Depression and Thoracic Compression Fracture who is admitted s/p Open Thoracic 7-Thoracic 12 Posterior Fusion with Revision of instrumentation from Thoracic 7-Sacral 1.  Internal Medicine service was asked to see for management of chronic medical problems.    Precautions/Limitations spinal precautions  (TLSO)   General Observations in chair and agreeable to OT session   Cognitive Status Examination   Orientation orientation to person, place and time   Level of Consciousness alert   Follows Commands (Cognition) WNL   Memory intact   Cognitive Comment somewhat impulsive, needs cues to attent   Visual Perception   Visual Perception Wears glasses   Pain  Assessment   Patient Currently in Pain Yes, see Vital Sign flowsheet  (rating not provided)   Integumentary/Edema   Integumentary/Edema no deficits were identifed   Strength   Manual Muscle Testing Quick Adds No deficits were identified   Hand Strength   Hand Strength Comments intact   Muscle Tone Assessment   Muscle Tone Quick Adds No deficits were identified   Coordination   Upper Extremity Coordination No deficits were identified   Mobility   Bed Mobility Comments SBA for sit to sideline   Transfer Skill: Bed to Chair/Chair to Bed   Level of Eddy: Bed to Chair stand-by assist   Physical Assist/Nonphysical Assist: Bed to Chair supervision   Weight-Bearing Restrictions full weight-bearing   Assistive Device - Transfer Skill Bed to Chair Chair to Bed Rehab Eval rolling walker   Transfer Skill: Sit to Stand   Level of Eddy: Sit/Stand stand-by assist   Physical Assist/Nonphysical Assist: Sit/Stand supervision   Transfer Skill: Sit to Stand full weight-bearing   Assistive Device for Transfer: Sit/Stand rolling walker   Toilet Transfer   Toilet Transfer Comments treatment initiated=defer to OT daily note for details   Tub/Shower Transfer   Tub/Shower Transfer Comments treatment initiated=defer to OT daily note for details   Balance   Balance Comments LOB was not noted   Upper Body Dressing   Level of Eddy: Dress Upper Body   (I with doffing TLSO)   Eating/Self Feeding   Level of Eddy: Eating independent   Instrumental Activities of Daily Living (IADL)   IADL Comments family to complete, patient interested in HC services   Activities of Daily Living Analysis   Impairments Contributing to Impaired Activities of Daily Living pain;post surgical precautions   General Therapy Interventions   Planned Therapy Interventions ADL retraining;progressive activity/exercise;transfer training   Clinical Impression   Criteria for Skilled Therapeutic Interventions Met yes, treatment indicated   OT  "Diagnosis decreased ADLS/IADls   Influenced by the following impairments pain;post surgical precautions   Assessment of Occupational Performance 5 or more Performance Deficits   Identified Performance Deficits decreased ADLS/IADls- dsg,t oiletingm bathing, community mobility, household chore, driving errands   Clinical Decision Making (Complexity) Low complexity   Therapy Frequency Daily   Predicted Duration of Therapy Intervention (days/wks) 2 session   Anticipated Equipment Needs at Discharge bath sponge;shower chair  (toilet tongs)   Anticipated Discharge Disposition Home   Risks and Benefits of Treatment have been explained. Yes   Patient, Family & other staff in agreement with plan of care Yes   [facility] TM \"6 Clicks\"   2016, Trustees of [facility], under license to [employer].  All rights reserved.   6 Clicks Short Forms Daily Activity Inpatient Short Form   [facility]  \"6 Clicks\" Daily Activity Inpatient Short Form   1. Putting on and taking off regular lower body clothing? 2 - A Lot   2. Bathing (including washing, rinsing, drying)? 2 - A Lot   3. Toileting, which includes using toilet, bedpan or urinal? 2 - A Lot   4. Putting on and taking off regular upper body clothing? 4 - None   5. Taking care of personal grooming such as brushing teeth? 4 - None   6. Eating meals? 4 - None   Daily Activity Raw Score (Score out of 24.Lower scores equate to lower levels of function) 18   Total Evaluation Time   Total Evaluation Time (Minutes) 10     "

## 2020-07-31 NOTE — PROGRESS NOTES
.Care Transitions Team: Following for CC, discharge planning, and disposition.        Per chart review MD has consulted [facility] for potential for disposition concerns.     Per PT  assessment     Recommendations for discharge: home with sister  Rationale for recommendations: Pt likely able to meet PT goals with continued IP PT. Pt has good support from sister at D/C. Pt would benefit from continued PT in ip setting to progress to independence with mobility skills      No SW needs identified at this time.   Please re- consult CM if there are changes/concern with above plan.     .    BALDO Tom   Care Transitions Services   [facility]     [phone]

## 2020-07-31 NOTE — PLAN OF CARE
PT- seen for PM session gait with 4ww as at home to 300 feet up an down 4 steps as at home. Goals are met recommend to PT for discharge will stay for night per MD plan. No DME needs.

## 2020-07-31 NOTE — PLAN OF CARE
PT: PT eval completed. Pt is status post thoracic fusion. Pt will be staying with her sister in 1 level home with ramp entry.  Discharge Planner PT   Patient plan for discharge: home with sister  Current status: Pt educated on PT role and POC. Pt was educated on spinal precautions, role of brace with OOB activity. Pt educated on activity modifications (squating instead of bending, moving frequently used items to counter height). Pt educated on fitting and adjusting brace, tips for donning/doffing in mirror as needed.  Pt was cued for supine to sit following spine precautions using log rolling. Pt needing CGA for performance. Pt was educated on how to perform sit<>Stand with hand placement and technique with FWW, CGA. Cues for better performance of spine precautions to avoid bending. Pt was educated on use of walker with ambulation, cues for step progression. Pt was educated on posture, trip hazards and visual scanning. CGA. 100 feet.    Barriers to return to prior living situation: none  Recommendations for discharge: home with sister  Rationale for recommendations: Pt likely able to meet PT goals with continued IP PT. Pt has good support from sister at D/C. Pt would benefit from continued PT in ip setting to progress to independence with mobility skills.

## 2020-07-31 NOTE — PLAN OF CARE
OT- eval completed and treatment initiated.  Pt is status post thoracic fusion. Pt will be staying with her sister in 1 level home with ramp entry.  Discharge Planner OT   Patient plan for discharge: home with sister initially  Current status: Educated in and provided spine handouts for spine precautions, proper body mechanics, EC/WS techniques, advancement of activity following surgery, car transfers, driving readiness and AE recommendations, throughout education, patient was engaged in instruction and verbalized understanding. After instruction, patient was mod I with tub transfer and toilet transfer with use of RTS on toilet. Patient somewhat impulsive, requires cues to slow down and attend to task in safe manner. Addressed AE recommendations; shower chair, toileting aid and LHS and how to obtain in community. Doffed TLSO and completed bed mobility with SBA, A with pillows for comfort.   Barriers to return to prior living situation: none  Recommendations for discharge: home with sister for IADLs and dsg as needed. AE recommendations; shower chair, toileting aid and LHS. Patient requesting home cares services.  Rationale for recommendations: Anticipate patient will meet needed goals for safe return home with support from family.        Entered by: Cristy Yan 07/31/2020 3:32 PM

## 2020-08-01 ENCOUNTER — APPOINTMENT (OUTPATIENT)
Dept: OCCUPATIONAL THERAPY | Facility: CLINIC | Age: 60
DRG: 457 | End: 2020-08-01
Attending: ORTHOPAEDIC SURGERY
Payer: COMMERCIAL

## 2020-08-01 VITALS
RESPIRATION RATE: 16 BRPM | DIASTOLIC BLOOD PRESSURE: 71 MMHG | HEART RATE: 86 BPM | BODY MASS INDEX: 36.86 KG/M2 | HEIGHT: 63 IN | OXYGEN SATURATION: 94 % | TEMPERATURE: 98.5 F | WEIGHT: 208 LBS | SYSTOLIC BLOOD PRESSURE: 110 MMHG

## 2020-08-01 LAB
GLUCOSE SERPL-MCNC: 100 MG/DL (ref 70–99)
HGB BLD-MCNC: 10.1 G/DL (ref 11.7–15.7)

## 2020-08-01 PROCEDURE — 85018 HEMOGLOBIN: CPT | Performed by: PHYSICIAN ASSISTANT

## 2020-08-01 PROCEDURE — 82947 ASSAY GLUCOSE BLOOD QUANT: CPT | Performed by: PHYSICIAN ASSISTANT

## 2020-08-01 PROCEDURE — 36415 COLL VENOUS BLD VENIPUNCTURE: CPT | Performed by: PHYSICIAN ASSISTANT

## 2020-08-01 PROCEDURE — 99232 SBSQ HOSP IP/OBS MODERATE 35: CPT | Performed by: INTERNAL MEDICINE

## 2020-08-01 PROCEDURE — 25000132 ZZH RX MED GY IP 250 OP 250 PS 637: Performed by: PHYSICIAN ASSISTANT

## 2020-08-01 PROCEDURE — 97535 SELF CARE MNGMENT TRAINING: CPT | Mod: GO | Performed by: REHABILITATION PRACTITIONER

## 2020-08-01 PROCEDURE — 25000132 ZZH RX MED GY IP 250 OP 250 PS 637: Performed by: CLINICAL NURSE SPECIALIST

## 2020-08-01 RX ORDER — OXYCODONE HCL 20 MG/1
20 TABLET, FILM COATED, EXTENDED RELEASE ORAL EVERY 12 HOURS
Qty: 20 TABLET | Refills: 0 | Status: SHIPPED | OUTPATIENT
Start: 2020-08-01 | End: 2020-08-11

## 2020-08-01 RX ORDER — OXYCODONE AND ACETAMINOPHEN 10; 325 MG/1; MG/1
1 TABLET ORAL EVERY 6 HOURS PRN
Qty: 30 TABLET | Refills: 0 | Status: SHIPPED | OUTPATIENT
Start: 2020-08-01 | End: 2021-02-16

## 2020-08-01 RX ADMIN — HYDROXYZINE HYDROCHLORIDE 25 MG: 25 TABLET ORAL at 02:07

## 2020-08-01 RX ADMIN — OXYCODONE HYDROCHLORIDE 10 MG: 5 TABLET ORAL at 13:20

## 2020-08-01 RX ADMIN — OXYCODONE HYDROCHLORIDE 20 MG: 5 TABLET ORAL at 00:24

## 2020-08-01 RX ADMIN — DOCUSATE SODIUM AND SENNOSIDES 1 TABLET: 8.6; 5 TABLET ORAL at 08:10

## 2020-08-01 RX ADMIN — LEVETIRACETAM 500 MG: 500 TABLET ORAL at 08:12

## 2020-08-01 RX ADMIN — OXYCODONE HYDROCHLORIDE 20 MG: 5 TABLET ORAL at 04:37

## 2020-08-01 RX ADMIN — GABAPENTIN 600 MG: 300 CAPSULE ORAL at 08:13

## 2020-08-01 RX ADMIN — CARISOPRODOL 350 MG: 350 TABLET ORAL at 00:24

## 2020-08-01 RX ADMIN — ACETAMINOPHEN 975 MG: 325 TABLET, FILM COATED ORAL at 00:24

## 2020-08-01 RX ADMIN — ACETAMINOPHEN 975 MG: 325 TABLET, FILM COATED ORAL at 10:37

## 2020-08-01 RX ADMIN — HYDROXYZINE HYDROCHLORIDE 25 MG: 25 TABLET ORAL at 10:37

## 2020-08-01 RX ADMIN — FAMOTIDINE 20 MG: 20 TABLET ORAL at 08:12

## 2020-08-01 RX ADMIN — OXYCODONE HYDROCHLORIDE 20 MG: 5 TABLET ORAL at 08:10

## 2020-08-01 RX ADMIN — CITALOPRAM HYDROBROMIDE 30 MG: 20 TABLET ORAL at 08:12

## 2020-08-01 ASSESSMENT — ACTIVITIES OF DAILY LIVING (ADL)
ADLS_ACUITY_SCORE: 12

## 2020-08-01 NOTE — CONSULTS
No SW needs identified at this time. Please place SW  consult if needs arise.       Desire Hernández, LGSW  Care Transitions Services  [facility]    [phone]

## 2020-08-01 NOTE — PLAN OF CARE
Vital signs stable.  Lungs clear, diminished in bases, encouraged inspirometer use.  Bowel sounds hypoactive, abdomen rounded,passing flatus.Voiding.  CMS intact.Dressing has moderate amount of  bloody drainage ,same reinforced, with sterile gauze and tape.  Ambulated with walker, gait belt, tlso brace and assist of 1.  Hgb 10.1.Seen by pain consultant.  Pain controlled with tylenol, oxycodone, soma, vistaril  tab.  Care plan reviewed with pt.

## 2020-08-01 NOTE — PROGRESS NOTES
Pt A&Ox4. Up with SBA, walker. Dressing with moderate drainage in the ends. changed dressing. Instructed pt to change dressing when it gets saturated. discharged to home with sister. Discharge instructions given to pt and her sister. Sent discharge medications with.

## 2020-08-01 NOTE — PROGRESS NOTES
"  SPIRITUAL HEALTH SERVICES Progress Note  [facility] 6-      Spiritual Health Services visit per consult order for emotional/spiritual support.   Provided reflective conversation to facilitate storytelling and the processing of thoughts and feelings; offered validation of feelings and affirmation.    Pt is Spiritism . Prayer requested and provided.     Illness Narrative -     Described context of admission and shared in detail about complex medical history and repeated surgeries . Reported that she is hopeful for good recovery and plans to stay with a friend during the next several weeks.   States team is helping manage pain well.     Distress -     Josselin became tearful when describing her relationship with her spouse and shared that she \"has come to some decisions\" about her future.    Josselin states she is missing her Faith community.  She has not been able to attend services due to Covid.  She also doesn't have a computer or smart phone to participate in zoom gatherings.     Coping -     Named her friend, who lives in near home in [place] as being supportive and attentive to her needs.    Identifies as Spiritism and is affiliated with local [employer].    Prayer is central to Josselin's ability to cope with difficult life circumstances.     Meaning Making-     Josselin engaged in life review about family, friends and gregorio.  She believes \"everything happens for a reason and God wants for us to be loved\".  Josselin is \"motivated to make some changes\". She is looking forward to healing and connecting with her gregorio community again.     Plan -  Pt is waiting for more information about plan of care and discharge.   Spiritual Health remain available for further support during hospital stay.         Walker Mathew MA  Staff   [phone]       "

## 2020-08-01 NOTE — DISCHARGE INSTRUCTIONS
After Back Surgery: Going Home    The sooner you become active, the sooner you ll get back to your normal routine. At the same time, you need to protect your healing back. Increase your activity level at a slow but steady pace. You may also see a physical therapist during this time. Follow any guidelines your doctor or physical therapist gives you.  Your first few weeks  You ll likely feel weak and tired at first, but you should feel a little stronger each day. Your incision may be sore. You may also feel some pain, tingling, or numbness in your back or legs. All of these symptoms should decrease as your nerves heal. Keep moving as much as you can without making your pain increase. Take your pain medicine as prescribed to keep the pain from becoming intolerable.  Your walking program  Walking is the best exercise for you after back surgery. It strengthens your back and leg muscles, increases your endurance, and relieves stress. Start by walking around the house. Build up to several walks a day. You may find it helpful to set a goal. Talk to your doctor or physical therapist about setting a safe, realistic goal for yourself.  Preventing setbacks  Increased pain for more than 2 hours after an activity means you ve done too much too soon. When you feel pain, slow down and pay more attention to your posture and movements. By about the sixth week after your surgery, you should be well on the way to healing. But continue to let pain be a warning to slow down.     When to call your healthcare provider  Call your doctor right away if you:    Feel persistent or severe pain, weakness, or numbness in your back or legs    Notice drainage, swelling, or increased redness around your incision or if there is a bad smell    Have a fever, severe headache, or extreme tiredness    Have trouble breathing or chest pain    Have problems controlling your bladder or bowels    Have swelling, pain, redness, or tenderness in your calf,  ankle, or foot   Date Last Reviewed: 5/1/2018 2000-2019 [employer]. [address] [place] [address], [place] [address]. All rights reserved. This information is not intended as a substitute for professional medical care. Always follow your healthcare professional's instructions.    Call suregon if any of these issues occur:  1) Increased/persistent redness,bleeding, localized warmth, increased swelling, and/or drainage (yellow/clear/odorous) incision site. Or the incision is pulling apart.  2) Increased pain not controlled with oral pain medications.  3) Persistent headache, dizziness, lightheaded    4)Persistent constipation despite taking OTC stool softners as directed   5) calf pain/swollen/hard/warm area,swelling chest pain or shortness of breath  6)increased/persistent numbness or tingling in arms or legs, weakness in extremities or falls  7) Generalized feeling of illness.  8) persistent fever, chills, sweats. Temp 101 or greater  9) trouble voiding, incontinence of bowel and/or bladder  10) too sleepy-could be amount of pain medication.  11)  If unable to wake call 911  Other instructions:  1) change dressing daily or more often for moist drainage. Wash hands before and after changing dressing. Avoid touching incision   2) )no heavy lifting, nothing more then 6-10lbs. No bending, lifiting or twisting, no sitting for long periods of time. Follow physical therapy restrictions and exercises-Slowly increase  activity  3) avoid sitting or laying in one position too long, walk as tolerated, log roll  4) wear brace when up per physical therapy, inspect skin under brace daily and call md if sore area starts.  5) take an over the counter stool softener as directed while on narcotics to prevent constipation or to stay regular. Take a suppository or a laxative if no bowel movement in 2 days despite taking stool softener    Follow up with Dr  10-14 days from the day of surgery to have your staples removed.      Thank you for using [facility]!!

## 2020-08-01 NOTE — DISCHARGE SUMMARY
This patient was admitted for following surgery for following reasons:    Procedure Date: 07/30/2020      PREOPERATIVE DIAGNOSES:  Proximal junctional kyphosis, thoracolumbar kyphosis, compression fracture at T12, T11 area.      POSTOPERATIVE DIAGNOSES:  Proximal junctional kyphosis, thoracolumbar kyphosis, compression fracture at T12, T11 area.      SURGEON:  Christian Love MD      FIRST ASSISTANT:  Josselin Ryan PA-C.  First assistant is necessary for patient positioning, wound closure, suctioning and tissue retraction.      PROCEDURES PERFORMED:   1.  T7 through T12 posterolateral fusion.   2.  Revision instrumentation T7 through sacrum with segmental fixation with new pedicle screws at T8, T9, T10 connecting to the old fusion by removing top 7 screws of the prior fusion mass.   3.  Local bone graft harvesting supplemented with bone marrow-soaked calcium triphosphate.      ESTIMATED BLOOD LOSS:  200 mL      OPERATING TIME:  Approximately 4-1/2 hours.      INDICATIONS FOR PROCEDURE:  The patient is a 59-year-old woman who developed a compression fracture and proximal junctional kyphosis above prior fusion resulting in severe thoracolumbar kyphosis and severe incapacitating pain.  The aforementioned procedure was recommended.  Cantilever maneuver was used to correct the thoracolumbar deformity.  The nature of surgery, risks, and alternatives were discussed.  The patient opted to proceed.      DESCRIPTION OF PROCEDURE:  The patient was brought to the operating theater.  General endotracheal anesthesia was induced in an uneventful manner.  Dimas catheter was inserted.  The patient was hooked up for intraoperative SSEP and EMG monitoring.  The hips were extended to minimize flat back deformity.  After routine prep and drape of the entire thoracolumbar area, midline incision was made approximately from T6 through sacrum.  Skin was incised.  Subcutaneous tissue was incised.  Previously inserted instrumentation was  exposed.  Previous screws were noted at T12 through S1.  The rods were cut just above the L5 screws and proximal 4 screws were removed from the prior fusion mass.  These were replaced with 1 mm larger diameter LNK [employer] pedicle reduction screws.  Each screw had excellent tightness into the purchase site.  New screws inserted into the pedicles of T8, T9, T10.  These were 50 mm length screws and each screw had a firm purchase at the purchase site.  AP and lateral images showed good positioning of the screws inserted and the EMG monitoring ensured that the screws were in safe location without the breach of the pedicles as though the screws stimulated greater than 15 milliamps.  Decortication of the prior fusion mass was carried out and fusion was carried out to T7 area by decortication, harvesting local bone, morcellized and then mixing with bone marrow-soaked calcium triphosphate.  This was laid from T7 through T12.  Then, using Cantilever technique engaging the distal portion of the maine to the old fusion mass from T12 through L4, the rods were pushed down further corrected with thoracolumbar kyphosis and engaged into the new screws at T8, T9, T10.  Final images showed good reduction of the thoracolumbar kyphosis that was present preoperatively and good implant position.  Nuts were tightened down using a counter torque wrench.  Reduction of the screw heads, tips were taken off.  Wound was irrigated.  Vancomycin powder sprayed into the wound.  Deep fascial tissue was reapproximated using #1 Vicryl suture.  Subcutaneous tissue was reapproximated using 2-0 Vicryl suture.  Skin was reapproximated using skin staples.  A sterile dressing was applied.  The patient was turned into supine position.  The patient was extubated and patient was taken to the recovery room in good condition.  The patient tolerated the procedure well.        The patient had morbid obesity with BMI of 37, making the surgery more difficult and  laborious, taking at least 1 hour of additional OR time.         LUDY PERKINS MD          Hospital Course:    Postop she did well.  Preop symptoms cleared up.  She was able to stand straighter without pain.   She was ambulating without difficulties with the pain under good control.  She was able to be discharged home POD #2.  See back in clinic within two weeks for staple removal in clinic.  Due to the long incision patient had drop in hgb 11.5 to 10.1 constituting acute blood loss anemia.

## 2020-08-01 NOTE — PROGRESS NOTES
[facility]             Hospitalist Progress Note    Name: Josselin Cunha    MRN: [id number]  YOB: 1960    Age: 59 year old  Date of admission: 7/30/2020  Primary care provider: Benny Bernal      Reason for Stay (Diagnosis): Proximal junctional kyphosis, thoracolumbar kyphosis, compression fracture at T12, T11 area s/p T7 through T12 posterolateral fusion with Revision instrumentation T7 through sacrum          Assessment and Plan:      Summary of Stay:  Josselin Cunha is a 59 year old female with a history of Asthma, Crohn's Disease, COPD, HTN, Seizure Disorder, ULISES, PTSD, Depression and Thoracic Compression Fracture who is admitted s/p Open Thoracic 7-Thoracic 12 Posterior Fusion with Revision of instrumentation from Thoracic 7-Sacral 1.  Internal Medicine service was asked to see for management of chronic medical problems.      Proximal junctional kyphosis, thoracolumbar kyphosis, compression fracture at T12, T11 area s/p T7 through T12 posterolateral fusion with Revision instrumentation T7 through sacrum - POD #2.   Pain better controlled.  - will defer diet, activity, DVT ppx, and pain control to primary team.   Do note that pain management team services has consulted.     Asthma/COPD - no acute exacerbation.    - continue pta Albuterol prn     Epilepsy - diagnosed 14 years ago.  Last seizure 4 years ago.  - continue pta Keppra     Depression/PTSD - continue pta Celexa     Crohn's Disease - hx of colon resection 7 years ago.  Not currently on any medical therapy.  No recent flares.    CODE STATUS: Full code  DVT prophylaxis: Per spine surgery  Disposition: Okay to discharge from medicine perspective.        Interval History (Subjective):      Feels better today and is very appreciative of cares.  Pain controlled.         Physical Exam:      Vital signs:  Temp: 97.9  F (36.6  C) Temp src: Temporal BP: 126/75 Pulse: 86 Heart Rate: 91 Resp: 16 SpO2: 95 % O2 Device: None  "(Room air) Oxygen Delivery: 1 LPM Height: 160 cm (5' 3\") Weight: 94.3 kg (208 lb)  Estimated body mass index is 36.85 kg/m  as calculated from the following:    Height as of this encounter: 1.6 m (5' 3\").    Weight as of this encounter: 94.3 kg (208 lb).    I/O last 3 completed shifts:  In: 2255 [P.O.:2255]  Out: 1225 [Urine:1225]  Vitals:    07/28/20 0900 07/29/20 0900 07/30/20 0618   Weight: 94.3 kg (208 lb) 96.8 kg (213 lb 8 oz) 94.3 kg (208 lb)       Constitutional: Awake, alert, cooperative, no apparent distress     Respiratory: Nl work of breathing.  Thoracolumbar brace in place           Skin: No rashes, no cyanosis, dry to touch   Neuro: CN 2-12 intact, no localizing weakness   Extremities: No edema, normal range of motion   HEENT Normocephalic, atraumatic, normal nasal turbinates; oropharynx clear   Neck Supple; nl inspection; trachea midline; no thryomegaly   Psychiatric:  Alert, up in bed but appears a bit groggy.  Pleasant affect          Medications:      All current medications were reviewed with changes reflected in problem list.         Data:      All new lab and imaging data was reviewed.   Labs:  No results for input(s): CULT in the last 168 hours.  Recent Labs   Lab 08/01/20  0634 07/31/20  0556 07/30/20  0704   HGB 10.1* 10.1* 11.5*     Recent Labs   Lab 08/01/20  0634 07/31/20  0556   NA  --  137   POTASSIUM  --  3.9   CHLORIDE  --  103   CO2  --  32   ANIONGAP  --  2*   * 120*   BUN  --  9   CR  --  0.54   GFRESTIMATED  --  >90   GFRESTBLACK  --  >90   CHARLES  --  7.9*      Imaging:   No results found for this or any previous visit (from the past 24 hour(s)).    Donato Singer -[phone]    "

## 2020-08-01 NOTE — CONSULTS
RN CTS acknowledges consult for care transitions.  Social work consult placed to follow up for discharge as patient is requesting home care services.    Maddy Jimenes MA/LISSETT Case Manager  Inpatient Care Coordination  [facility]   [phone]

## 2020-08-01 NOTE — PLAN OF CARE
Pt A/O x 3, VSS, afebrile. Tolerating regular diet. Transfers with Ax1, gait belt, brace and walker. Moderate drainage, Dressing reinforced, CMS intact. Voiding in adequate amounts. Pain managed with Oxy 20mg, Soma 350mg and Flexeril. Pt unable to use call light appropriately and was turning off bed alarm and ambulating independently in room, sitter was placed in order to keep pt safe. Plans d/c to home.

## 2020-08-01 NOTE — PLAN OF CARE
Discharge Planner OT   Patient plan for discharge: home with sister  Current status: SBA for bed mobility with bedrail support. After review, patient was min A with TB dressing for various tasks- bra management, donning shorts over feet, placement of TLSO. Patient was having difficulty with FMC, somewhat slurring of speech, heavy eye lids, patient appearing very groggy- RN was updated. SBA, fww for sit<>stand to pull up clothing over hips, ambulated to chair. Cues for safe hand placement for stand to sit. Patient set up to eat breakfast, sitter returned at end of session. Goals met.   Barriers to return to prior living situation:  none  Recommendations for discharge: home with sister for IADLs and dsg as needed. AE recommendations; shower chair, toileting aid and LHS. Patient requesting home cares services.  Rationale for recommendations:  SBA, fww with basic functional mobility and ADL's. Anticipate family support for IADL's; household chores, driving, errands, cooking and bathing. Recommended AE; shower chair, LHS and toileting aid, patient has all other needed AE. Will discharge from OT services.     Occupational Therapy Discharge Summary    Reason for therapy discharge:    All goals and outcomes met, no further needs identified.    Progress towards therapy goal(s). See goals on Care Plan in [facility] electronic health record for goal details.  Goals met    Therapy recommendation(s):    No further therapy is recommended.         Entered by: Cristy Yan 08/01/2020 8:03 AM

## 2021-02-16 RX ORDER — OXYCODONE HYDROCHLORIDE 5 MG/1
5 CAPSULE ORAL EVERY 6 HOURS PRN
Status: ON HOLD | COMMUNITY
End: 2021-03-13

## 2021-02-16 RX ORDER — ALBUTEROL SULFATE 0.83 MG/ML
2.5 SOLUTION RESPIRATORY (INHALATION) EVERY 6 HOURS PRN
COMMUNITY

## 2021-02-16 RX ORDER — OXYCODONE HCL 10 MG/1
10 TABLET, FILM COATED, EXTENDED RELEASE ORAL EVERY 12 HOURS
Status: ON HOLD | COMMUNITY
End: 2021-03-13

## 2021-02-16 RX ORDER — GABAPENTIN 300 MG/1
300 CAPSULE ORAL EVERY MORNING
COMMUNITY

## 2021-03-09 RX ORDER — CARISOPRODOL 350 MG/1
350 TABLET ORAL 2 TIMES DAILY PRN
COMMUNITY

## 2021-03-09 ASSESSMENT — MIFFLIN-ST. JEOR: SCORE: 1405.96

## 2021-03-09 NOTE — PHARMACY-ADMISSION MEDICATION HISTORY
Pharmacy reviewed prior to admission med list from pre-admitting rn, SHY Mckeon.    [facility] shows fills of Soma and Asmanex, pre-op physical also lists these as PTA meds.  Called pt, she has both at home, takes Soma very prn and supposed to use Asmanex daily in am but she forgets sometimes.  Added both meds to PTA med list.    Prior to Admission medications    Medication Sig Last Dose Taking? Auth Provider   albuterol (PROAIR HFA/PROVENTIL HFA/VENTOLIN HFA) 108 (90 Base) MCG/ACT inhaler Inhale 2 puffs into the lungs every 4 hours as needed for shortness of breath / dyspnea or wheezing  Yes Reported, Patient   albuterol (PROVENTIL) (2.5 MG/3ML) 0.083% neb solution Take 2.5 mg by nebulization every 6 hours as needed for shortness of breath / dyspnea or wheezing  Yes Reported, Patient   carisoprodol (SOMA) 350 MG tablet Take 350 mg by mouth 2 times daily as needed for muscle spasms  Yes Unknown, Entered By History   citalopram (CELEXA) 20 MG tablet Take 20 mg by mouth daily   Yes Unknown, Entered By History   gabapentin (NEURONTIN) 300 MG capsule Take 300 mg by mouth every morning  Yes Reported, Patient   gabapentin (NEURONTIN) 300 MG capsule Take 600 mg by mouth every evening   Yes Reported, Patient   levETIRAcetam (KEPPRA) 1000 MG tablet Take 1,000 mg by mouth every evening   Yes Reported, Patient   levETIRAcetam (KEPPRA) 500 MG tablet Take 500 mg by mouth every morning  Yes Unknown, Entered By History   mometasone (ASMANEX TWISTHALER) 220 MCG/INH inhaler Inhale 1-2 puffs into the lungs every morning  Yes Unknown, Entered By History   oxyCODONE (OXY-IR) 5 MG capsule Take 5 mg by mouth every 6 hours as needed for severe pain  Yes Reported, Patient   oxyCODONE (OXYCONTIN) 10 MG 12 hr tablet Take 10 mg by mouth every 12 hours  Yes Reported, Patient   SUMAtriptan (IMITREX) 100 MG tablet Take 100 mg by mouth at onset of headache for migraine  Yes Reported, Patient

## 2021-03-11 ENCOUNTER — APPOINTMENT (OUTPATIENT)
Dept: GENERAL RADIOLOGY | Facility: CLINIC | Age: 61
End: 2021-03-11
Attending: ORTHOPAEDIC SURGERY
Payer: COMMERCIAL

## 2021-03-11 ENCOUNTER — HOSPITAL ENCOUNTER (INPATIENT)
Facility: CLINIC | Age: 61
LOS: 2 days | Discharge: HOME | End: 2021-03-13
Attending: ORTHOPAEDIC SURGERY | Admitting: ORTHOPAEDIC SURGERY
Payer: COMMERCIAL

## 2021-03-11 ENCOUNTER — ANESTHESIA (OUTPATIENT)
Dept: SURGERY | Facility: CLINIC | Age: 61
End: 2021-03-11
Payer: COMMERCIAL

## 2021-03-11 ENCOUNTER — ANESTHESIA EVENT (OUTPATIENT)
Dept: SURGERY | Facility: CLINIC | Age: 61
End: 2021-03-11
Payer: COMMERCIAL

## 2021-03-11 DIAGNOSIS — G89.18 POST-OP PAIN: Primary | ICD-10-CM

## 2021-03-11 PROBLEM — M43.25 FUSION OF SPINE, THORACOLUMBAR REGION: Status: ACTIVE | Noted: 2021-03-11

## 2021-03-11 LAB
ABO + RH BLD: NORMAL
ABO + RH BLD: NORMAL
BLD GP AB SCN SERPL QL: NORMAL
BLOOD BANK CMNT PATIENT-IMP: NORMAL
SPECIMEN EXP DATE BLD: NORMAL

## 2021-03-11 PROCEDURE — 4A1134G MONITORING OF PERIPHERAL NERVOUS ELECTRICAL ACTIVITY, INTRAOPERATIVE, PERCUTANEOUS APPROACH: ICD-10-PCS | Performed by: ORTHOPAEDIC SURGERY

## 2021-03-11 PROCEDURE — 0RPA04Z REMOVAL OF INTERNAL FIXATION DEVICE FROM THORACOLUMBAR VERTEBRAL JOINT, OPEN APPROACH: ICD-10-PCS | Performed by: ORTHOPAEDIC SURGERY

## 2021-03-11 PROCEDURE — 0QH004Z INSERTION OF INTERNAL FIXATION DEVICE INTO LUMBAR VERTEBRA, OPEN APPROACH: ICD-10-PCS | Performed by: ORTHOPAEDIC SURGERY

## 2021-03-11 PROCEDURE — 07DS3ZZ EXTRACTION OF VERTEBRAL BONE MARROW, PERCUTANEOUS APPROACH: ICD-10-PCS | Performed by: ORTHOPAEDIC SURGERY

## 2021-03-11 PROCEDURE — 999N000179 XR SURGERY CARM FLUORO LESS THAN 5 MIN W STILLS: Mod: TC

## 2021-03-11 PROCEDURE — 0RGA0K1 FUSION OF THORACOLUMBAR VERTEBRAL JOINT WITH NONAUTOLOGOUS TISSUE SUBSTITUTE, POSTERIOR APPROACH, POSTERIOR COLUMN, OPEN APPROACH: ICD-10-PCS | Performed by: ORTHOPAEDIC SURGERY

## 2021-03-11 PROCEDURE — 120N000001 HC R&B MED SURG/OB

## 2021-03-11 PROCEDURE — 0QH104Z INSERTION OF INTERNAL FIXATION DEVICE INTO SACRUM, OPEN APPROACH: ICD-10-PCS | Performed by: ORTHOPAEDIC SURGERY

## 2021-03-11 PROCEDURE — 272N000004 HC RX 272: Performed by: ORTHOPAEDIC SURGERY

## 2021-03-11 PROCEDURE — 0RG70K1 FUSION OF 2 TO 7 THORACIC VERTEBRAL JOINTS WITH NONAUTOLOGOUS TISSUE SUBSTITUTE, POSTERIOR APPROACH, POSTERIOR COLUMN, OPEN APPROACH: ICD-10-PCS | Performed by: ORTHOPAEDIC SURGERY

## 2021-03-11 PROCEDURE — 250N000013 HC RX MED GY IP 250 OP 250 PS 637: Performed by: PHYSICIAN ASSISTANT

## 2021-03-11 PROCEDURE — 0SP304Z REMOVAL OF INTERNAL FIXATION DEVICE FROM LUMBOSACRAL JOINT, OPEN APPROACH: ICD-10-PCS | Performed by: ORTHOPAEDIC SURGERY

## 2021-03-11 PROCEDURE — 710N000009 HC RECOVERY PHASE 1, LEVEL 1, PER MIN: Performed by: ORTHOPAEDIC SURGERY

## 2021-03-11 PROCEDURE — 0SB20ZZ EXCISION OF LUMBAR VERTEBRAL DISC, OPEN APPROACH: ICD-10-PCS | Performed by: ORTHOPAEDIC SURGERY

## 2021-03-11 PROCEDURE — 250N000005 HC OR RX SURGIFLO HEMOSTATIC MATRIX 10ML 199102S OPNP: Performed by: ORTHOPAEDIC SURGERY

## 2021-03-11 PROCEDURE — 0SP004Z REMOVAL OF INTERNAL FIXATION DEVICE FROM LUMBAR VERTEBRAL JOINT, OPEN APPROACH: ICD-10-PCS | Performed by: ORTHOPAEDIC SURGERY

## 2021-03-11 PROCEDURE — 258N000003 HC RX IP 258 OP 636: Performed by: NURSE ANESTHETIST, CERTIFIED REGISTERED

## 2021-03-11 PROCEDURE — 250N000011 HC RX IP 250 OP 636: Performed by: PHYSICIAN ASSISTANT

## 2021-03-11 PROCEDURE — 272N000683 HC OR SPINE - CAGE/SPACER/DISK/CORD/CONNECTOR OPNP: Performed by: ORTHOPAEDIC SURGERY

## 2021-03-11 PROCEDURE — 250N000011 HC RX IP 250 OP 636: Performed by: NURSE ANESTHETIST, CERTIFIED REGISTERED

## 2021-03-11 PROCEDURE — 0RP604Z REMOVAL OF INTERNAL FIXATION DEVICE FROM THORACIC VERTEBRAL JOINT, OPEN APPROACH: ICD-10-PCS | Performed by: ORTHOPAEDIC SURGERY

## 2021-03-11 PROCEDURE — 999N000141 HC STATISTIC PRE-PROCEDURE NURSING ASSESSMENT: Performed by: ORTHOPAEDIC SURGERY

## 2021-03-11 PROCEDURE — L8699 PROSTHETIC IMPLANT NOS: HCPCS | Performed by: ORTHOPAEDIC SURGERY

## 2021-03-11 PROCEDURE — 272N000001 HC OR GENERAL SUPPLY STERILE: Performed by: ORTHOPAEDIC SURGERY

## 2021-03-11 PROCEDURE — 258N000003 HC RX IP 258 OP 636: Performed by: ORTHOPAEDIC SURGERY

## 2021-03-11 PROCEDURE — 250N000011 HC RX IP 250 OP 636: Performed by: ORTHOPAEDIC SURGERY

## 2021-03-11 PROCEDURE — 0SG30K1 FUSION OF LUMBOSACRAL JOINT WITH NONAUTOLOGOUS TISSUE SUBSTITUTE, POSTERIOR APPROACH, POSTERIOR COLUMN, OPEN APPROACH: ICD-10-PCS | Performed by: ORTHOPAEDIC SURGERY

## 2021-03-11 PROCEDURE — 86900 BLOOD TYPING SEROLOGIC ABO: CPT | Performed by: ORTHOPAEDIC SURGERY

## 2021-03-11 PROCEDURE — 36415 COLL VENOUS BLD VENIPUNCTURE: CPT | Performed by: ORTHOPAEDIC SURGERY

## 2021-03-11 PROCEDURE — 86901 BLOOD TYPING SEROLOGIC RH(D): CPT | Performed by: ORTHOPAEDIC SURGERY

## 2021-03-11 PROCEDURE — 250N000009 HC RX 250: Performed by: NURSE ANESTHETIST, CERTIFIED REGISTERED

## 2021-03-11 PROCEDURE — 250N000009 HC RX 250: Performed by: PHYSICIAN ASSISTANT

## 2021-03-11 PROCEDURE — 250N000009 HC RX 250: Performed by: ORTHOPAEDIC SURGERY

## 2021-03-11 PROCEDURE — 0SG00A0 FUSION OF LUMBAR VERTEBRAL JOINT WITH INTERBODY FUSION DEVICE, ANTERIOR APPROACH, ANTERIOR COLUMN, OPEN APPROACH: ICD-10-PCS | Performed by: ORTHOPAEDIC SURGERY

## 2021-03-11 PROCEDURE — 360N000084 HC SURGERY LEVEL 4 W/ FLUORO, PER MIN: Performed by: ORTHOPAEDIC SURGERY

## 2021-03-11 PROCEDURE — 250N000013 HC RX MED GY IP 250 OP 250 PS 637: Performed by: ORTHOPAEDIC SURGERY

## 2021-03-11 PROCEDURE — 370N000017 HC ANESTHESIA TECHNICAL FEE, PER MIN: Performed by: ORTHOPAEDIC SURGERY

## 2021-03-11 PROCEDURE — 250N000011 HC RX IP 250 OP 636: Performed by: ANESTHESIOLOGY

## 2021-03-11 PROCEDURE — 0SG10K1 FUSION OF 2 OR MORE LUMBAR VERTEBRAL JOINTS WITH NONAUTOLOGOUS TISSUE SUBSTITUTE, POSTERIOR APPROACH, POSTERIOR COLUMN, OPEN APPROACH: ICD-10-PCS | Performed by: ORTHOPAEDIC SURGERY

## 2021-03-11 PROCEDURE — C1762 CONN TISS, HUMAN(INC FASCIA): HCPCS | Performed by: ORTHOPAEDIC SURGERY

## 2021-03-11 PROCEDURE — C1713 ANCHOR/SCREW BN/BN,TIS/BN: HCPCS | Performed by: ORTHOPAEDIC SURGERY

## 2021-03-11 PROCEDURE — 272N000282 HC OR IOM SUPPLIES OPNP: Performed by: ORTHOPAEDIC SURGERY

## 2021-03-11 PROCEDURE — 86850 RBC ANTIBODY SCREEN: CPT | Performed by: ORTHOPAEDIC SURGERY

## 2021-03-11 DEVICE — GRAFT BONE INFUSE BMP LG 7510600: Type: IMPLANTABLE DEVICE | Site: SPINE LUMBAR | Status: FUNCTIONAL

## 2021-03-11 DEVICE — IMPLANTABLE DEVICE: Type: IMPLANTABLE DEVICE | Site: SPINE LUMBAR | Status: FUNCTIONAL

## 2021-03-11 RX ORDER — PROPOFOL 10 MG/ML
INJECTION, EMULSION INTRAVENOUS CONTINUOUS PRN
Status: DISCONTINUED | OUTPATIENT
Start: 2021-03-11 | End: 2021-03-11

## 2021-03-11 RX ORDER — CEFAZOLIN SODIUM 2 G/100ML
2 INJECTION, SOLUTION INTRAVENOUS SEE ADMIN INSTRUCTIONS
Status: DISCONTINUED | OUTPATIENT
Start: 2021-03-11 | End: 2021-03-11 | Stop reason: HOSPADM

## 2021-03-11 RX ORDER — ONDANSETRON 4 MG/1
4 TABLET, ORALLY DISINTEGRATING ORAL EVERY 6 HOURS PRN
Status: DISCONTINUED | OUTPATIENT
Start: 2021-03-11 | End: 2021-03-13 | Stop reason: HOSPADM

## 2021-03-11 RX ORDER — CLINDAMYCIN PHOSPHATE 900 MG/50ML
900 INJECTION, SOLUTION INTRAVENOUS EVERY 8 HOURS
Status: COMPLETED | OUTPATIENT
Start: 2021-03-11 | End: 2021-03-12

## 2021-03-11 RX ORDER — OXYCODONE HYDROCHLORIDE 5 MG/1
10-20 TABLET ORAL
Status: DISCONTINUED | OUTPATIENT
Start: 2021-03-11 | End: 2021-03-13 | Stop reason: HOSPADM

## 2021-03-11 RX ORDER — LIDOCAINE HYDROCHLORIDE 10 MG/ML
INJECTION, SOLUTION INFILTRATION; PERINEURAL PRN
Status: DISCONTINUED | OUTPATIENT
Start: 2021-03-11 | End: 2021-03-11

## 2021-03-11 RX ORDER — ALBUTEROL SULFATE 0.83 MG/ML
2.5 SOLUTION RESPIRATORY (INHALATION) EVERY 4 HOURS PRN
Status: DISCONTINUED | OUTPATIENT
Start: 2021-03-11 | End: 2021-03-11 | Stop reason: HOSPADM

## 2021-03-11 RX ORDER — KETOROLAC TROMETHAMINE 30 MG/ML
30 INJECTION, SOLUTION INTRAMUSCULAR; INTRAVENOUS EVERY 6 HOURS
Status: DISCONTINUED | OUTPATIENT
Start: 2021-03-11 | End: 2021-03-11

## 2021-03-11 RX ORDER — AMOXICILLIN 250 MG
1 CAPSULE ORAL 2 TIMES DAILY
Status: DISCONTINUED | OUTPATIENT
Start: 2021-03-11 | End: 2021-03-13 | Stop reason: HOSPADM

## 2021-03-11 RX ORDER — ACETAMINOPHEN 325 MG/1
975 TABLET ORAL ONCE
Status: DISCONTINUED | OUTPATIENT
Start: 2021-03-11 | End: 2021-03-11 | Stop reason: HOSPADM

## 2021-03-11 RX ORDER — HYDROMORPHONE HYDROCHLORIDE 1 MG/ML
.3-.5 INJECTION, SOLUTION INTRAMUSCULAR; INTRAVENOUS; SUBCUTANEOUS EVERY 10 MIN PRN
Status: DISCONTINUED | OUTPATIENT
Start: 2021-03-11 | End: 2021-03-11 | Stop reason: HOSPADM

## 2021-03-11 RX ORDER — ACETAMINOPHEN 325 MG/1
975 TABLET ORAL ONCE
Status: COMPLETED | OUTPATIENT
Start: 2021-03-11 | End: 2021-03-11

## 2021-03-11 RX ORDER — LEVETIRACETAM 500 MG/1
1000 TABLET ORAL EVERY EVENING
Status: DISCONTINUED | OUTPATIENT
Start: 2021-03-11 | End: 2021-03-13 | Stop reason: HOSPADM

## 2021-03-11 RX ORDER — NALOXONE HYDROCHLORIDE 0.4 MG/ML
0.2 INJECTION, SOLUTION INTRAMUSCULAR; INTRAVENOUS; SUBCUTANEOUS
Status: DISCONTINUED | OUTPATIENT
Start: 2021-03-11 | End: 2021-03-11 | Stop reason: HOSPADM

## 2021-03-11 RX ORDER — MEPERIDINE HYDROCHLORIDE 25 MG/ML
12.5 INJECTION INTRAMUSCULAR; INTRAVENOUS; SUBCUTANEOUS
Status: DISCONTINUED | OUTPATIENT
Start: 2021-03-11 | End: 2021-03-11 | Stop reason: HOSPADM

## 2021-03-11 RX ORDER — AMOXICILLIN 250 MG
2 CAPSULE ORAL 2 TIMES DAILY
Status: DISCONTINUED | OUTPATIENT
Start: 2021-03-11 | End: 2021-03-13 | Stop reason: HOSPADM

## 2021-03-11 RX ORDER — METOPROLOL TARTRATE 1 MG/ML
1-2 INJECTION, SOLUTION INTRAVENOUS EVERY 5 MIN PRN
Status: DISCONTINUED | OUTPATIENT
Start: 2021-03-11 | End: 2021-03-11 | Stop reason: HOSPADM

## 2021-03-11 RX ORDER — FENTANYL CITRATE 50 UG/ML
INJECTION, SOLUTION INTRAMUSCULAR; INTRAVENOUS PRN
Status: DISCONTINUED | OUTPATIENT
Start: 2021-03-11 | End: 2021-03-11

## 2021-03-11 RX ORDER — VANCOMYCIN HYDROCHLORIDE 1 G/20ML
INJECTION, POWDER, LYOPHILIZED, FOR SOLUTION INTRAVENOUS PRN
Status: DISCONTINUED | OUTPATIENT
Start: 2021-03-11 | End: 2021-03-11 | Stop reason: HOSPADM

## 2021-03-11 RX ORDER — ONDANSETRON 2 MG/ML
4 INJECTION INTRAMUSCULAR; INTRAVENOUS EVERY 30 MIN PRN
Status: DISCONTINUED | OUTPATIENT
Start: 2021-03-11 | End: 2021-03-11 | Stop reason: HOSPADM

## 2021-03-11 RX ORDER — NALOXONE HYDROCHLORIDE 0.4 MG/ML
0.4 INJECTION, SOLUTION INTRAMUSCULAR; INTRAVENOUS; SUBCUTANEOUS
Status: DISCONTINUED | OUTPATIENT
Start: 2021-03-11 | End: 2021-03-11 | Stop reason: HOSPADM

## 2021-03-11 RX ORDER — ONDANSETRON 4 MG/1
4 TABLET, ORALLY DISINTEGRATING ORAL EVERY 30 MIN PRN
Status: DISCONTINUED | OUTPATIENT
Start: 2021-03-11 | End: 2021-03-11 | Stop reason: HOSPADM

## 2021-03-11 RX ORDER — NALOXONE HYDROCHLORIDE 0.4 MG/ML
0.4 INJECTION, SOLUTION INTRAMUSCULAR; INTRAVENOUS; SUBCUTANEOUS
Status: DISCONTINUED | OUTPATIENT
Start: 2021-03-11 | End: 2021-03-13 | Stop reason: HOSPADM

## 2021-03-11 RX ORDER — HYDROMORPHONE HYDROCHLORIDE 1 MG/ML
.5-1 INJECTION, SOLUTION INTRAMUSCULAR; INTRAVENOUS; SUBCUTANEOUS
Status: DISCONTINUED | OUTPATIENT
Start: 2021-03-11 | End: 2021-03-13 | Stop reason: HOSPADM

## 2021-03-11 RX ORDER — SUMATRIPTAN 50 MG/1
100 TABLET, FILM COATED ORAL
Status: DISCONTINUED | OUTPATIENT
Start: 2021-03-11 | End: 2021-03-13 | Stop reason: HOSPADM

## 2021-03-11 RX ORDER — ONDANSETRON 2 MG/ML
INJECTION INTRAMUSCULAR; INTRAVENOUS PRN
Status: DISCONTINUED | OUTPATIENT
Start: 2021-03-11 | End: 2021-03-11

## 2021-03-11 RX ORDER — TRANEXAMIC ACID 10 MG/ML
5 INJECTION, SOLUTION INTRAVENOUS CONTINUOUS
Status: DISCONTINUED | OUTPATIENT
Start: 2021-03-11 | End: 2021-03-11 | Stop reason: HOSPADM

## 2021-03-11 RX ORDER — CITALOPRAM HYDROBROMIDE 20 MG/1
20 TABLET ORAL DAILY
Status: DISCONTINUED | OUTPATIENT
Start: 2021-03-12 | End: 2021-03-13 | Stop reason: HOSPADM

## 2021-03-11 RX ORDER — FENTANYL CITRATE 50 UG/ML
25-50 INJECTION, SOLUTION INTRAMUSCULAR; INTRAVENOUS
Status: DISCONTINUED | OUTPATIENT
Start: 2021-03-11 | End: 2021-03-11 | Stop reason: HOSPADM

## 2021-03-11 RX ORDER — LEVETIRACETAM 500 MG/1
500 TABLET ORAL EVERY MORNING
Status: DISCONTINUED | OUTPATIENT
Start: 2021-03-12 | End: 2021-03-13 | Stop reason: HOSPADM

## 2021-03-11 RX ORDER — GABAPENTIN 300 MG/1
300 CAPSULE ORAL 3 TIMES DAILY
Status: DISCONTINUED | OUTPATIENT
Start: 2021-03-11 | End: 2021-03-13 | Stop reason: HOSPADM

## 2021-03-11 RX ORDER — ONDANSETRON 2 MG/ML
4 INJECTION INTRAMUSCULAR; INTRAVENOUS EVERY 6 HOURS PRN
Status: DISCONTINUED | OUTPATIENT
Start: 2021-03-11 | End: 2021-03-13 | Stop reason: HOSPADM

## 2021-03-11 RX ORDER — NEOSTIGMINE METHYLSULFATE 1 MG/ML
VIAL (ML) INJECTION PRN
Status: DISCONTINUED | OUTPATIENT
Start: 2021-03-11 | End: 2021-03-11

## 2021-03-11 RX ORDER — CEFAZOLIN SODIUM 2 G/100ML
2 INJECTION, SOLUTION INTRAVENOUS
Status: DISCONTINUED | OUTPATIENT
Start: 2021-03-11 | End: 2021-03-11 | Stop reason: HOSPADM

## 2021-03-11 RX ORDER — EPHEDRINE SULFATE 50 MG/ML
INJECTION, SOLUTION INTRAMUSCULAR; INTRAVENOUS; SUBCUTANEOUS PRN
Status: DISCONTINUED | OUTPATIENT
Start: 2021-03-11 | End: 2021-03-11

## 2021-03-11 RX ORDER — HYDROXYZINE HYDROCHLORIDE 25 MG/1
25 TABLET, FILM COATED ORAL EVERY 6 HOURS
Status: DISCONTINUED | OUTPATIENT
Start: 2021-03-11 | End: 2021-03-13 | Stop reason: HOSPADM

## 2021-03-11 RX ORDER — CYCLOBENZAPRINE HCL 10 MG
10 TABLET ORAL 3 TIMES DAILY
Status: DISCONTINUED | OUTPATIENT
Start: 2021-03-11 | End: 2021-03-12

## 2021-03-11 RX ORDER — OXYCODONE HCL 40 MG/1
40 TABLET, FILM COATED, EXTENDED RELEASE ORAL EVERY 12 HOURS
Status: DISCONTINUED | OUTPATIENT
Start: 2021-03-11 | End: 2021-03-11

## 2021-03-11 RX ORDER — NALOXONE HYDROCHLORIDE 0.4 MG/ML
0.2 INJECTION, SOLUTION INTRAMUSCULAR; INTRAVENOUS; SUBCUTANEOUS
Status: DISCONTINUED | OUTPATIENT
Start: 2021-03-11 | End: 2021-03-13 | Stop reason: HOSPADM

## 2021-03-11 RX ORDER — CLINDAMYCIN PHOSPHATE 900 MG/50ML
900 INJECTION, SOLUTION INTRAVENOUS ONCE
Status: COMPLETED | OUTPATIENT
Start: 2021-03-11 | End: 2021-03-11

## 2021-03-11 RX ORDER — KETOROLAC TROMETHAMINE 30 MG/ML
30 INJECTION, SOLUTION INTRAMUSCULAR; INTRAVENOUS EVERY 6 HOURS
Status: COMPLETED | OUTPATIENT
Start: 2021-03-11 | End: 2021-03-13

## 2021-03-11 RX ORDER — SODIUM CHLORIDE, SODIUM LACTATE, POTASSIUM CHLORIDE, CALCIUM CHLORIDE 600; 310; 30; 20 MG/100ML; MG/100ML; MG/100ML; MG/100ML
INJECTION, SOLUTION INTRAVENOUS CONTINUOUS
Status: DISCONTINUED | OUTPATIENT
Start: 2021-03-11 | End: 2021-03-11 | Stop reason: HOSPADM

## 2021-03-11 RX ORDER — TRANEXAMIC ACID 10 MG/ML
10 INJECTION, SOLUTION INTRAVENOUS ONCE
Status: COMPLETED | OUTPATIENT
Start: 2021-03-11 | End: 2021-03-11

## 2021-03-11 RX ORDER — LEVETIRACETAM 1000 MG/1
1000 TABLET ORAL EVERY EVENING
Status: DISCONTINUED | OUTPATIENT
Start: 2021-03-11 | End: 2021-03-11

## 2021-03-11 RX ORDER — PROPOFOL 10 MG/ML
INJECTION, EMULSION INTRAVENOUS PRN
Status: DISCONTINUED | OUTPATIENT
Start: 2021-03-11 | End: 2021-03-11

## 2021-03-11 RX ORDER — LIDOCAINE 40 MG/G
CREAM TOPICAL
Status: DISCONTINUED | OUTPATIENT
Start: 2021-03-11 | End: 2021-03-11 | Stop reason: HOSPADM

## 2021-03-11 RX ORDER — LIDOCAINE 40 MG/G
CREAM TOPICAL
Status: DISCONTINUED | OUTPATIENT
Start: 2021-03-11 | End: 2021-03-13 | Stop reason: HOSPADM

## 2021-03-11 RX ORDER — ALBUTEROL SULFATE 90 UG/1
2 AEROSOL, METERED RESPIRATORY (INHALATION) EVERY 4 HOURS PRN
Status: DISCONTINUED | OUTPATIENT
Start: 2021-03-11 | End: 2021-03-13 | Stop reason: HOSPADM

## 2021-03-11 RX ORDER — OXYCODONE HCL 10 MG/1
20 TABLET, FILM COATED, EXTENDED RELEASE ORAL EVERY 12 HOURS
Status: DISCONTINUED | OUTPATIENT
Start: 2021-03-11 | End: 2021-03-13 | Stop reason: HOSPADM

## 2021-03-11 RX ORDER — SODIUM CHLORIDE AND POTASSIUM CHLORIDE 150; 900 MG/100ML; MG/100ML
INJECTION, SOLUTION INTRAVENOUS CONTINUOUS
Status: DISCONTINUED | OUTPATIENT
Start: 2021-03-11 | End: 2021-03-13 | Stop reason: HOSPADM

## 2021-03-11 RX ORDER — ACETAMINOPHEN 325 MG/1
650 TABLET ORAL EVERY 4 HOURS PRN
Status: DISCONTINUED | OUTPATIENT
Start: 2021-03-14 | End: 2021-03-13 | Stop reason: HOSPADM

## 2021-03-11 RX ORDER — GABAPENTIN 300 MG/1
300 CAPSULE ORAL
Status: DISCONTINUED | OUTPATIENT
Start: 2021-03-11 | End: 2021-03-11 | Stop reason: HOSPADM

## 2021-03-11 RX ORDER — FAMOTIDINE 20 MG/1
20 TABLET, FILM COATED ORAL 2 TIMES DAILY
Status: DISCONTINUED | OUTPATIENT
Start: 2021-03-11 | End: 2021-03-13 | Stop reason: HOSPADM

## 2021-03-11 RX ORDER — GLYCOPYRROLATE 0.2 MG/ML
INJECTION, SOLUTION INTRAMUSCULAR; INTRAVENOUS PRN
Status: DISCONTINUED | OUTPATIENT
Start: 2021-03-11 | End: 2021-03-11

## 2021-03-11 RX ORDER — DEXAMETHASONE SODIUM PHOSPHATE 4 MG/ML
INJECTION, SOLUTION INTRA-ARTICULAR; INTRALESIONAL; INTRAMUSCULAR; INTRAVENOUS; SOFT TISSUE PRN
Status: DISCONTINUED | OUTPATIENT
Start: 2021-03-11 | End: 2021-03-11

## 2021-03-11 RX ORDER — MAGNESIUM HYDROXIDE 1200 MG/15ML
LIQUID ORAL PRN
Status: DISCONTINUED | OUTPATIENT
Start: 2021-03-11 | End: 2021-03-11 | Stop reason: HOSPADM

## 2021-03-11 RX ORDER — BUPIVACAINE HYDROCHLORIDE 2.5 MG/ML
INJECTION, SOLUTION EPIDURAL; INFILTRATION; INTRACAUDAL PRN
Status: DISCONTINUED | OUTPATIENT
Start: 2021-03-11 | End: 2021-03-11 | Stop reason: HOSPADM

## 2021-03-11 RX ORDER — SODIUM CHLORIDE, SODIUM LACTATE, POTASSIUM CHLORIDE, CALCIUM CHLORIDE 600; 310; 30; 20 MG/100ML; MG/100ML; MG/100ML; MG/100ML
INJECTION, SOLUTION INTRAVENOUS CONTINUOUS PRN
Status: DISCONTINUED | OUTPATIENT
Start: 2021-03-11 | End: 2021-03-11

## 2021-03-11 RX ORDER — ACETAMINOPHEN 325 MG/1
975 TABLET ORAL EVERY 8 HOURS
Status: DISCONTINUED | OUTPATIENT
Start: 2021-03-11 | End: 2021-03-13 | Stop reason: HOSPADM

## 2021-03-11 RX ORDER — METHADONE HYDROCHLORIDE 10 MG/ML
20 INJECTION, SOLUTION INTRAMUSCULAR; INTRAVENOUS; SUBCUTANEOUS ONCE
Status: COMPLETED | OUTPATIENT
Start: 2021-03-11 | End: 2021-03-11

## 2021-03-11 RX ADMIN — ACETAMINOPHEN 975 MG: 325 TABLET, FILM COATED ORAL at 06:26

## 2021-03-11 RX ADMIN — ACETAMINOPHEN 975 MG: 325 TABLET, FILM COATED ORAL at 16:38

## 2021-03-11 RX ADMIN — METHADONE HYDROCHLORIDE 20 MG: 10 INJECTION, SOLUTION INTRAMUSCULAR; INTRAVENOUS; SUBCUTANEOUS at 07:43

## 2021-03-11 RX ADMIN — Medication 10 MG: at 07:55

## 2021-03-11 RX ADMIN — FENTANYL CITRATE 100 MCG: 50 INJECTION, SOLUTION INTRAMUSCULAR; INTRAVENOUS at 07:43

## 2021-03-11 RX ADMIN — ROCURONIUM BROMIDE 40 MG: 10 INJECTION INTRAVENOUS at 08:41

## 2021-03-11 RX ADMIN — HYDROXYZINE HYDROCHLORIDE 25 MG: 25 TABLET, FILM COATED ORAL at 18:38

## 2021-03-11 RX ADMIN — OXYCODONE HYDROCHLORIDE 10 MG: 5 TABLET ORAL at 13:32

## 2021-03-11 RX ADMIN — ROCURONIUM BROMIDE 5 MG: 10 INJECTION INTRAVENOUS at 07:42

## 2021-03-11 RX ADMIN — SODIUM CHLORIDE, POTASSIUM CHLORIDE, SODIUM LACTATE AND CALCIUM CHLORIDE: 600; 310; 30; 20 INJECTION, SOLUTION INTRAVENOUS at 07:55

## 2021-03-11 RX ADMIN — LIDOCAINE HYDROCHLORIDE 30 MG: 10 INJECTION, SOLUTION INFILTRATION; PERINEURAL at 07:43

## 2021-03-11 RX ADMIN — Medication 5 MG: at 08:09

## 2021-03-11 RX ADMIN — HYDROMORPHONE HYDROCHLORIDE 0.5 MG: 1 INJECTION, SOLUTION INTRAMUSCULAR; INTRAVENOUS; SUBCUTANEOUS at 13:09

## 2021-03-11 RX ADMIN — CYCLOBENZAPRINE HYDROCHLORIDE 10 MG: 10 TABLET, FILM COATED ORAL at 16:38

## 2021-03-11 RX ADMIN — POTASSIUM CHLORIDE AND SODIUM CHLORIDE: 900; 150 INJECTION, SOLUTION INTRAVENOUS at 14:58

## 2021-03-11 RX ADMIN — HYDROMORPHONE HYDROCHLORIDE 0.5 MG: 1 INJECTION, SOLUTION INTRAMUSCULAR; INTRAVENOUS; SUBCUTANEOUS at 15:00

## 2021-03-11 RX ADMIN — KETOROLAC TROMETHAMINE 30 MG: 30 INJECTION, SOLUTION INTRAMUSCULAR at 20:04

## 2021-03-11 RX ADMIN — KETOROLAC TROMETHAMINE 30 MG: 30 INJECTION, SOLUTION INTRAMUSCULAR at 14:53

## 2021-03-11 RX ADMIN — TRANEXAMIC ACID 1 G: 10 INJECTION, SOLUTION INTRAVENOUS at 07:36

## 2021-03-11 RX ADMIN — OXYCODONE HYDROCHLORIDE 20 MG: 10 TABLET, FILM COATED, EXTENDED RELEASE ORAL at 20:02

## 2021-03-11 RX ADMIN — GLYCOPYRROLATE 0.8 MG: 0.2 INJECTION, SOLUTION INTRAMUSCULAR; INTRAVENOUS at 11:38

## 2021-03-11 RX ADMIN — PHENYLEPHRINE HYDROCHLORIDE 100 MCG: 10 INJECTION INTRAVENOUS at 08:09

## 2021-03-11 RX ADMIN — TRANEXAMIC ACID 1 G: 10 INJECTION, SOLUTION INTRAVENOUS at 11:17

## 2021-03-11 RX ADMIN — OXYCODONE HYDROCHLORIDE 20 MG: 5 TABLET ORAL at 22:59

## 2021-03-11 RX ADMIN — PROPOFOL 100 MG: 10 INJECTION, EMULSION INTRAVENOUS at 07:43

## 2021-03-11 RX ADMIN — DOCUSATE SODIUM 50 MG AND SENNOSIDES 8.6 MG 2 TABLET: 8.6; 5 TABLET, FILM COATED ORAL at 20:03

## 2021-03-11 RX ADMIN — FENTANYL CITRATE 50 MCG: 50 INJECTION, SOLUTION INTRAMUSCULAR; INTRAVENOUS at 12:27

## 2021-03-11 RX ADMIN — FAMOTIDINE 20 MG: 20 TABLET ORAL at 20:03

## 2021-03-11 RX ADMIN — CYCLOBENZAPRINE HYDROCHLORIDE 10 MG: 10 TABLET, FILM COATED ORAL at 20:03

## 2021-03-11 RX ADMIN — FENTANYL CITRATE 50 MCG: 50 INJECTION, SOLUTION INTRAMUSCULAR; INTRAVENOUS at 12:42

## 2021-03-11 RX ADMIN — Medication 10 MG: at 07:56

## 2021-03-11 RX ADMIN — PHENYLEPHRINE HYDROCHLORIDE 100 MCG: 10 INJECTION INTRAVENOUS at 08:22

## 2021-03-11 RX ADMIN — CLINDAMYCIN IN 5 PERCENT DEXTROSE 900 MG: 18 INJECTION, SOLUTION INTRAVENOUS at 16:44

## 2021-03-11 RX ADMIN — LEVETIRACETAM 1000 MG: 500 TABLET, FILM COATED ORAL at 20:03

## 2021-03-11 RX ADMIN — DEXAMETHASONE SODIUM PHOSPHATE 8 MG: 4 INJECTION, SOLUTION INTRA-ARTICULAR; INTRALESIONAL; INTRAMUSCULAR; INTRAVENOUS; SOFT TISSUE at 07:43

## 2021-03-11 RX ADMIN — ONDANSETRON HYDROCHLORIDE 4 MG: 2 INJECTION, SOLUTION INTRAVENOUS at 11:05

## 2021-03-11 RX ADMIN — GABAPENTIN 300 MG: 300 CAPSULE ORAL at 16:38

## 2021-03-11 RX ADMIN — OXYCODONE HYDROCHLORIDE 20 MG: 5 TABLET ORAL at 17:41

## 2021-03-11 RX ADMIN — PHENYLEPHRINE HYDROCHLORIDE 0.3 MCG/KG/MIN: 10 INJECTION INTRAVENOUS at 09:01

## 2021-03-11 RX ADMIN — NEOSTIGMINE METHYLSULFATE 5 MG: 1 INJECTION, SOLUTION INTRAVENOUS at 11:38

## 2021-03-11 RX ADMIN — SODIUM CHLORIDE, POTASSIUM CHLORIDE, SODIUM LACTATE AND CALCIUM CHLORIDE: 600; 310; 30; 20 INJECTION, SOLUTION INTRAVENOUS at 11:44

## 2021-03-11 RX ADMIN — MIDAZOLAM 2 MG: 1 INJECTION INTRAMUSCULAR; INTRAVENOUS at 07:36

## 2021-03-11 RX ADMIN — PROPOFOL 150 MCG/KG/MIN: 10 INJECTION, EMULSION INTRAVENOUS at 08:00

## 2021-03-11 RX ADMIN — CLINDAMYCIN PHOSPHATE 900 MG: 900 INJECTION, SOLUTION INTRAVENOUS at 07:36

## 2021-03-11 RX ADMIN — GABAPENTIN 300 MG: 300 CAPSULE ORAL at 20:03

## 2021-03-11 RX ADMIN — GLYCOPYRROLATE 0.2 MG: 0.2 INJECTION, SOLUTION INTRAMUSCULAR; INTRAVENOUS at 07:43

## 2021-03-11 RX ADMIN — SODIUM CHLORIDE, POTASSIUM CHLORIDE, SODIUM LACTATE AND CALCIUM CHLORIDE: 600; 310; 30; 20 INJECTION, SOLUTION INTRAVENOUS at 07:30

## 2021-03-11 RX ADMIN — Medication 100 MG: at 07:43

## 2021-03-11 RX ADMIN — HYDROMORPHONE HYDROCHLORIDE 0.5 MG: 1 INJECTION, SOLUTION INTRAMUSCULAR; INTRAVENOUS; SUBCUTANEOUS at 21:53

## 2021-03-11 ASSESSMENT — LIFESTYLE VARIABLES: TOBACCO_USE: 1

## 2021-03-11 ASSESSMENT — ACTIVITIES OF DAILY LIVING (ADL)
ADLS_ACUITY_SCORE: 14
ADLS_ACUITY_SCORE: 14

## 2021-03-11 ASSESSMENT — MIFFLIN-ST. JEOR
SCORE: 1374.21
SCORE: 1374.08

## 2021-03-11 ASSESSMENT — ENCOUNTER SYMPTOMS: SEIZURES: 1

## 2021-03-11 ASSESSMENT — COPD QUESTIONNAIRES: COPD: 1

## 2021-03-11 NOTE — ANESTHESIA PROCEDURE NOTES
Airway   Date/Time: 3/11/2021 7:45 AM      Staff -   CRNA: Becky Mclean APRN CRNA  Performed By: CRNA    Consent for Airway   Urgency: elective    Indications and Patient Condition  Indications for airway management: luz maria-procedural  Induction type:intravenousMask difficulty assessment: 2 - vent by mask + OA or adjuvant +/- NMBA    Final Airway Details  Final airway type: endotracheal airway  Successful airway:ETT - single and Oral  Endotracheal Airway Details   ETT size (mm): 7.0  Cuffed: yes  Successful intubation technique: video laryngoscopy  Grade View of Cords: 2  Adjucts: stylet  Measured from: gums/teeth  Secured at (cm): 21  Secured with: plastic tape  Bite block used: Soft    Post intubation assessment   Placement verified by: capnometry, equal breath sounds and chest rise   Number of attempts at approach: 1  Secured with:plastic tape  Ease of procedure: easy  Dentition: Unchanged

## 2021-03-11 NOTE — BRIEF OP NOTE
[facility] Brief Operative Note    Pre-operative diagnosis: Compression fracture of lumbar vertebra, non-traumatic, sequela [M48.56XS]  Failed cervical fusion [M96.0]   Post-operative diagnosis Failed fusion at L2 L3 and other sites     Procedure: Procedure(s):  lumbar two to lumbar three, transforaminal lumbar interbody fusion/interbody and posteriolateral fusion, minimally invasive versus open.  thoracic 8 to sacral one revision of fusion, with instrumentation, with use of infuse.  EXPLORATION, SPINE, WITH HARDWARE REMOVAL   Surgeon(s): Surgeon(s) and Role:     * Christian Love MD - Primary     * Josselin Ryan PA-C - Assisting   Estimated blood loss: 500 mL    Specimens: * No specimens in log *   Findings: See op note

## 2021-03-11 NOTE — ANESTHESIA PREPROCEDURE EVALUATION
Anesthesia Pre-Procedure Evaluation    Patient: Josselin Cunha   MRN: [id number] : 1960        Preoperative Diagnosis: Compression fracture of lumbar vertebra, non-traumatic, sequela [M48.56XS]  Failed cervical fusion [M96.0]   Procedure : Procedure(s):  lumbar two to lumbar three, transforaminal lumbar interbody fusion/interbody and posteriolateral fusion, minimally invasive versus open.  thoracic 8 to sacral one revision of fusion, with instrumentation, with use of infuse.  EXPLORATION, SPINE, WITH HARDWARE REMOVAL     Past Medical History:   Diagnosis Date     Acute Crohn's disease (H)      Complication of anesthesia     needs nebs before surgery     COPD (chronic obstructive pulmonary disease) (H)      Drug overdose      Hypertension     resolved, no longer on medication     Noninfectious ileitis     Chrons     Other chronic pain     back     PTSD (post-traumatic stress disorder)      Seizures (H)     last seizure      Sleep apnea     not tested but thinks she has it     Uncomplicated asthma       Past Surgical History:   Procedure Laterality Date     ABDOMEN SURGERY      4 c-sections     BACK SURGERY      4 back surgeries     COLONOSCOPY       DECOMPRESSION, FUSION LUMBAR POSTERIOR THREE + LEVELS, COMBINED N/A 2019    Procedure: 1.  Removal of prior instrumentation, L3, L4, L5.  2.  Exploration of spinal fusion.  3.  Repeat posterolateral fusion at L3-L4.  4.  Posterior fusion, T11-T12, L1, L2, L3.  5.  Segmental instrumentation T11-12, L2, L2, L3 L4, L5 and sacrum using iliac screws.;  Surgeon: Christian Love MD;  Location: [facility] OR     DECOMPRESSION, FUSION LUMBAR POSTERIOR TWO LEVELS, COMBINED N/A 2019    Procedure: L3 - L4  oblique lateral lumbar interbody fusion  L3 Posterior minimally invasive pedicle screw placement and posterolateral instrumentation L3 to L4  and  Posterior fusion  cut off old maine below L4 and remove old screws at L4 bilateral and replacement;  Surgeon: Christian Love  MD Russell;  Location: [facility] OR     EXPLORE SPINE, REMOVE HARDWARE, COMBINED N/A 2019    Procedure: Explore spine, remove hardware, combined;  Surgeon: Christian Love MD;  Location: [facility] OR     FUSION SPINE POSTERIOR THORACIC THREE + LEVELS N/A 2020    Procedure: T7 through T12 posterolateral fusion.  2.  Revision instrumentation T7 through sacrum with segmental fixation with new pedicle screws at T8, T9, T10 connecting to the old fusion by removing top 7 screws of the prior fusion mass.;  Surgeon: Christian Love MD;  Location: [facility] OR     GI SURGERY      bowel resection for crohns     ORTHOPEDIC SURGERY Bilateral     knee replacement      Allergies   Allergen Reactions     Tramadol      Instructed not to take per neurologist exacerbates seizures     Cefaclor Hives      Social History     Tobacco Use     Smoking status: Former Smoker     Quit date: 2018     Years since quittin.3     Smokeless tobacco: Never Used   Substance Use Topics     Alcohol use: No     Frequency: Never      Wt Readings from Last 1 Encounters:   21 83 kg (183 lb)        Anesthesia Evaluation            ROS/MED HX  ENT/Pulmonary:     (+) sleep apnea, tobacco use, Past use, asthma COPD,     Neurologic:     (+) seizures,     Cardiovascular:     (+) hypertension-----    METS/Exercise Tolerance:     Hematologic:  - neg hematologic  ROS     Musculoskeletal:  - neg musculoskeletal ROS     GI/Hepatic:     (+) Inflammatory bowel disease,     Renal/Genitourinary:  - neg Renal ROS     Endo:  - neg endo ROS     Psychiatric/Substance Use:     (+) psychiatric history anxiety and depression H/O chronic opiod use .     Infectious Disease:  - neg infectious disease ROS     Malignancy:  - neg malignancy ROS     Other:  - neg other ROS    (+) , H/O Chronic Pain,        Physical Exam    Airway        Mallampati: II    Neck ROM: full     Respiratory Devices and Support         Dental           Cardiovascular   cardiovascular exam normal        Rhythm and rate: regular     Pulmonary           (+) decreased breath sounds           OUTSIDE LABS:  CBC:   Lab Results   Component Value Date    HGB 10.1 (L) 08/01/2020    HGB 10.1 (L) 07/31/2020     BMP:   Lab Results   Component Value Date     07/31/2020     09/13/2019    POTASSIUM 3.9 07/31/2020    POTASSIUM 4.4 09/13/2019    CHLORIDE 103 07/31/2020    CHLORIDE 107 09/13/2019    CO2 32 07/31/2020    CO2 30 09/13/2019    BUN 9 07/31/2020    BUN 18 09/13/2019    CR 0.54 07/31/2020    CR 0.54 09/13/2019     (H) 08/01/2020     (H) 07/31/2020     COAGS: No results found for: PTT, INR, FIBR  POC: No results found for: BGM, HCG, HCGS  HEPATIC: No results found for: ALBUMIN, PROTTOTAL, ALT, AST, GGT, ALKPHOS, BILITOTAL, BILIDIRECT, CARLO  OTHER:   Lab Results   Component Value Date    CHARLES 7.9 (L) 07/31/2020       Anesthesia Plan    ASA Status:  3      Anesthesia Type: General.     - Airway: ETT   Induction: Intravenous, Propofol.   Maintenance: Balanced.        Consents    Anesthesia Plan(s) and associated risks, benefits, and realistic alternatives discussed. Questions answered and patient/representative(s) expressed understanding.     - Discussed with:  Patient         Postoperative Care    Pain management: IV analgesics, Oral pain medications, Multi-modal analgesia.   PONV prophylaxis: Ondansetron (or other 5HT-3), Dexamethasone or Solumedrol     Comments:                Ollie Rooney DO

## 2021-03-11 NOTE — ANESTHESIA POSTPROCEDURE EVALUATION
Patient: Josselin Cunha    Procedure(s):  lumbar two to lumbar three, transforaminal lumbar interbody fusion/interbody and posteriolateral fusion, minimally invasive versus open.  thoracic 8 to sacral one revision of fusion, with instrumentation, with use of infuse.  EXPLORATION, SPINE, WITH HARDWARE REMOVAL    Diagnosis:Compression fracture of lumbar vertebra, non-traumatic, sequela [M48.56XS]  Failed cervical fusion [M96.0]  Diagnosis Additional Information: No value filed.    Anesthesia Type:  General    Note:     Postop Pain Control: Uneventful            Sign Out: Well controlled pain   PONV: No   Neuro/Psych: Uneventful            Sign Out: Acceptable/Baseline neuro status   Airway/Respiratory: Uneventful            Sign Out: Acceptable/Baseline resp. status   CV/Hemodynamics: Uneventful            Sign Out: Acceptable CV status   Other NRE: NONE   DID A NON-ROUTINE EVENT OCCUR? No         Last vitals:  Vitals:    03/11/21 1320 03/11/21 1335 03/11/21 1423   BP: 122/75 124/70    Pulse: 81 97    Resp: 8 13    Temp:  98.8  F (37.1  C)    SpO2: 94% 96% 95%       Last vitals prior to Anesthesia Care Transfer:  CRNA VITALS  3/11/2021 1139 - 3/11/2021 1239      3/11/2021             NIBP:  140/87    NIBP Mean:  111          Electronically Signed By: Ollie Rooney DO  March 11, 2021  2:25 PM

## 2021-03-11 NOTE — ANESTHESIA CARE TRANSFER NOTE
Patient: Josselin Cunha    Procedure(s):  lumbar two to lumbar three, transforaminal lumbar interbody fusion/interbody and posteriolateral fusion, minimally invasive versus open.  thoracic 8 to sacral one revision of fusion, with instrumentation, with use of infuse.  EXPLORATION, SPINE, WITH HARDWARE REMOVAL    Diagnosis: Compression fracture of lumbar vertebra, non-traumatic, sequela [M48.56XS]  Failed cervical fusion [M96.0]  Diagnosis Additional Information: No value filed.    Anesthesia Type:   General     Note:    Oropharynx: oropharynx clear of all foreign objects  Level of Consciousness: awake  Oxygen Supplementation: face mask  Level of Supplemental Oxygen (L/min / FiO2): 4  Independent Airway: airway patency satisfactory and stable  Dentition: dentition unchanged  Vital Signs Stable: post-procedure vital signs reviewed and stable  Report to RN Given: handoff report given  Patient transferred to: PACU    Handoff Report: Identifed the Patient, Identified the Reponsible Provider, Reviewed the pertinent medical history, Discussed the surgical course, Reviewed Intra-OP anesthesia mangement and issues during anesthesia, Set expectations for post-procedure period and Allowed opportunity for questions and acknowledgement of understanding      Vitals: (Last set prior to Anesthesia Care Transfer)  CRNA VITALS  3/11/2021 1139 - 3/11/2021 1216      3/11/2021             NIBP:  140/87    NIBP Mean:  111        Electronically Signed By: ALEJO Queen CRNA  March 11, 2021  12:16 PM

## 2021-03-11 NOTE — OR NURSING
During the procedure Dr Love explanted 18 set screw, 4 rods and 7 screws from the patient's thoracolumbar region, including 7.5 x 40 screw from L5, which is not in our individual implants record.  The hardware was disposed of per facility policy.

## 2021-03-11 NOTE — PLAN OF CARE
Pt arrived from PACU to floor at 1415. Pt settled in room. Education done and admission done.Pt A&Ox4. VSS afebrile Capno WNL. 2L NC. Cms intact. Denies nausea.  Hypoactive BS. LS clear. Edema to orbital areas bilateral and bilateral cheeks. Dressing c/d/I. Pain at 8 out of 10. IV Dilaudid given at 1500.  Pt wanted to hold on other medications until she had pain managed a little better.  Plan is discharge home with  bhupendra. Will continue to monitor per POC

## 2021-03-12 ENCOUNTER — APPOINTMENT (OUTPATIENT)
Dept: PHYSICAL THERAPY | Facility: CLINIC | Age: 61
End: 2021-03-12
Attending: ORTHOPAEDIC SURGERY
Payer: COMMERCIAL

## 2021-03-12 ENCOUNTER — APPOINTMENT (OUTPATIENT)
Dept: OCCUPATIONAL THERAPY | Facility: CLINIC | Age: 61
End: 2021-03-12
Attending: ORTHOPAEDIC SURGERY
Payer: COMMERCIAL

## 2021-03-12 LAB
ANION GAP SERPL CALCULATED.3IONS-SCNC: 3 MMOL/L (ref 3–14)
BUN SERPL-MCNC: 10 MG/DL (ref 7–30)
CALCIUM SERPL-MCNC: 8 MG/DL (ref 8.5–10.1)
CHLORIDE SERPL-SCNC: 105 MMOL/L (ref 94–109)
CO2 SERPL-SCNC: 31 MMOL/L (ref 20–32)
CREAT SERPL-MCNC: 0.56 MG/DL (ref 0.52–1.04)
GFR SERPL CREATININE-BSD FRML MDRD: >90 ML/MIN/{1.73_M2}
GLUCOSE SERPL-MCNC: 132 MG/DL (ref 70–99)
HGB BLD-MCNC: 9.5 G/DL (ref 11.7–15.7)
POTASSIUM SERPL-SCNC: 4.3 MMOL/L (ref 3.4–5.3)
SODIUM SERPL-SCNC: 139 MMOL/L (ref 133–144)

## 2021-03-12 PROCEDURE — 250N000013 HC RX MED GY IP 250 OP 250 PS 637: Performed by: PHYSICIAN ASSISTANT

## 2021-03-12 PROCEDURE — 85018 HEMOGLOBIN: CPT | Performed by: PHYSICIAN ASSISTANT

## 2021-03-12 PROCEDURE — 99221 1ST HOSP IP/OBS SF/LOW 40: CPT | Performed by: NURSE PRACTITIONER

## 2021-03-12 PROCEDURE — 250N000009 HC RX 250: Performed by: PHYSICIAN ASSISTANT

## 2021-03-12 PROCEDURE — 97116 GAIT TRAINING THERAPY: CPT | Mod: GP | Performed by: PHYSICAL THERAPIST

## 2021-03-12 PROCEDURE — 80048 BASIC METABOLIC PNL TOTAL CA: CPT | Performed by: PHYSICIAN ASSISTANT

## 2021-03-12 PROCEDURE — 36415 COLL VENOUS BLD VENIPUNCTURE: CPT | Performed by: PHYSICIAN ASSISTANT

## 2021-03-12 PROCEDURE — 97161 PT EVAL LOW COMPLEX 20 MIN: CPT | Mod: GP | Performed by: PHYSICAL THERAPIST

## 2021-03-12 PROCEDURE — 250N000011 HC RX IP 250 OP 636: Performed by: PHYSICIAN ASSISTANT

## 2021-03-12 PROCEDURE — 250N000013 HC RX MED GY IP 250 OP 250 PS 637: Performed by: ORTHOPAEDIC SURGERY

## 2021-03-12 PROCEDURE — 99207 PR NO BILLABLE SERVICE THIS VISIT: CPT | Performed by: PHYSICIAN ASSISTANT

## 2021-03-12 PROCEDURE — 99222 1ST HOSP IP/OBS MODERATE 55: CPT | Performed by: INTERNAL MEDICINE

## 2021-03-12 PROCEDURE — 250N000013 HC RX MED GY IP 250 OP 250 PS 637: Performed by: NURSE PRACTITIONER

## 2021-03-12 PROCEDURE — 97165 OT EVAL LOW COMPLEX 30 MIN: CPT | Mod: GO | Performed by: REHABILITATION PRACTITIONER

## 2021-03-12 PROCEDURE — 120N000001 HC R&B MED SURG/OB

## 2021-03-12 PROCEDURE — 97530 THERAPEUTIC ACTIVITIES: CPT | Mod: GP | Performed by: PHYSICAL THERAPIST

## 2021-03-12 PROCEDURE — 97535 SELF CARE MNGMENT TRAINING: CPT | Mod: GO | Performed by: REHABILITATION PRACTITIONER

## 2021-03-12 RX ORDER — METHOCARBAMOL 500 MG/1
500 TABLET, FILM COATED ORAL 4 TIMES DAILY
Status: DISCONTINUED | OUTPATIENT
Start: 2021-03-12 | End: 2021-03-13 | Stop reason: HOSPADM

## 2021-03-12 RX ADMIN — FAMOTIDINE 20 MG: 20 TABLET ORAL at 19:58

## 2021-03-12 RX ADMIN — CLINDAMYCIN IN 5 PERCENT DEXTROSE 900 MG: 18 INJECTION, SOLUTION INTRAVENOUS at 00:06

## 2021-03-12 RX ADMIN — ACETAMINOPHEN 975 MG: 325 TABLET, FILM COATED ORAL at 00:12

## 2021-03-12 RX ADMIN — CYCLOBENZAPRINE HYDROCHLORIDE 10 MG: 10 TABLET, FILM COATED ORAL at 08:20

## 2021-03-12 RX ADMIN — OXYCODONE HYDROCHLORIDE 20 MG: 5 TABLET ORAL at 06:30

## 2021-03-12 RX ADMIN — KETOROLAC TROMETHAMINE 30 MG: 30 INJECTION, SOLUTION INTRAMUSCULAR at 08:20

## 2021-03-12 RX ADMIN — CITALOPRAM HYDROBROMIDE 20 MG: 20 TABLET ORAL at 08:21

## 2021-03-12 RX ADMIN — OXYCODONE HYDROCHLORIDE 20 MG: 10 TABLET, FILM COATED, EXTENDED RELEASE ORAL at 20:03

## 2021-03-12 RX ADMIN — OXYCODONE HYDROCHLORIDE 20 MG: 5 TABLET ORAL at 22:36

## 2021-03-12 RX ADMIN — FAMOTIDINE 20 MG: 20 TABLET ORAL at 08:21

## 2021-03-12 RX ADMIN — METHOCARBAMOL 500 MG: 500 TABLET ORAL at 16:02

## 2021-03-12 RX ADMIN — OXYCODONE HYDROCHLORIDE 20 MG: 5 TABLET ORAL at 16:05

## 2021-03-12 RX ADMIN — OXYCODONE HYDROCHLORIDE 20 MG: 5 TABLET ORAL at 03:02

## 2021-03-12 RX ADMIN — METHOCARBAMOL 500 MG: 500 TABLET ORAL at 19:58

## 2021-03-12 RX ADMIN — DOCUSATE SODIUM 50 MG AND SENNOSIDES 8.6 MG 1 TABLET: 8.6; 5 TABLET, FILM COATED ORAL at 20:03

## 2021-03-12 RX ADMIN — KETOROLAC TROMETHAMINE 30 MG: 30 INJECTION, SOLUTION INTRAMUSCULAR at 02:06

## 2021-03-12 RX ADMIN — OXYCODONE HYDROCHLORIDE 20 MG: 5 TABLET ORAL at 09:38

## 2021-03-12 RX ADMIN — POTASSIUM CHLORIDE AND SODIUM CHLORIDE: 900; 150 INJECTION, SOLUTION INTRAVENOUS at 03:00

## 2021-03-12 RX ADMIN — KETOROLAC TROMETHAMINE 30 MG: 30 INJECTION, SOLUTION INTRAMUSCULAR at 14:06

## 2021-03-12 RX ADMIN — HYDROXYZINE HYDROCHLORIDE 25 MG: 25 TABLET, FILM COATED ORAL at 00:06

## 2021-03-12 RX ADMIN — KETOROLAC TROMETHAMINE 30 MG: 30 INJECTION, SOLUTION INTRAMUSCULAR at 19:58

## 2021-03-12 RX ADMIN — GABAPENTIN 300 MG: 300 CAPSULE ORAL at 14:07

## 2021-03-12 RX ADMIN — LEVETIRACETAM 500 MG: 500 TABLET, FILM COATED ORAL at 08:21

## 2021-03-12 RX ADMIN — ACETAMINOPHEN 975 MG: 325 TABLET, FILM COATED ORAL at 14:06

## 2021-03-12 RX ADMIN — DOCUSATE SODIUM 50 MG AND SENNOSIDES 8.6 MG 1 TABLET: 8.6; 5 TABLET, FILM COATED ORAL at 08:22

## 2021-03-12 RX ADMIN — LEVETIRACETAM 1000 MG: 500 TABLET, FILM COATED ORAL at 19:58

## 2021-03-12 RX ADMIN — ACETAMINOPHEN 975 MG: 325 TABLET, FILM COATED ORAL at 06:30

## 2021-03-12 RX ADMIN — OXYCODONE HYDROCHLORIDE 20 MG: 10 TABLET, FILM COATED, EXTENDED RELEASE ORAL at 08:21

## 2021-03-12 RX ADMIN — GABAPENTIN 300 MG: 300 CAPSULE ORAL at 19:58

## 2021-03-12 RX ADMIN — ACETAMINOPHEN 975 MG: 325 TABLET, FILM COATED ORAL at 22:35

## 2021-03-12 RX ADMIN — HYDROXYZINE HYDROCHLORIDE 25 MG: 25 TABLET, FILM COATED ORAL at 06:30

## 2021-03-12 RX ADMIN — METHOCARBAMOL 500 MG: 500 TABLET ORAL at 11:01

## 2021-03-12 RX ADMIN — GABAPENTIN 300 MG: 300 CAPSULE ORAL at 08:21

## 2021-03-12 RX ADMIN — HYDROMORPHONE HYDROCHLORIDE 0.5 MG: 1 INJECTION, SOLUTION INTRAMUSCULAR; INTRAVENOUS; SUBCUTANEOUS at 00:10

## 2021-03-12 ASSESSMENT — ACTIVITIES OF DAILY LIVING (ADL)
ADLS_ACUITY_SCORE: 13
ADLS_ACUITY_SCORE: 14
ADLS_ACUITY_SCORE: 13
ADLS_ACUITY_SCORE: 13
ADLS_ACUITY_SCORE: 14
ADLS_ACUITY_SCORE: 14

## 2021-03-12 NOTE — CONSULTS
[facility]  Pain Service Consultation   Text Page    Date of Admission:  3/11/2021    Assessment & Plan   Josselin Cunha is a 60 year old female who was admitted on 3/11/2021. I was asked by Josselin Ryan to see the patient for acute on chronic pain management post op.    PLAN:   1)  Opioids:  - oxycodone  ER 20 mg PO every 12 hours (baseline 10 mg every 12 hours)  - oxycodone IR 10-20 mg PO every 3 hours prn  - Hydromorphone injection 0.5 - 1 mg IV every 2 hours prn severe/breakthrough pain  Opioids Treatment Goal:   -Improvement in function  -Participate in PT  -Post-operative pain management, expected 3-7 days then back on chronic dose    2)Non-opioid multimodal medication therapy  -Topical:Not indicated   -N-SAIDS:Avoid due to new skeletal fusion  -Muscle Relaxants:Methocarbamol 500 mg four times daily, discontinue cyclobenzaprine.  -Adjuvants:Acetaminophen 975 mg every 8 hours scheduled, Gabapentin 300 mg three times daily, Hydroxyzine 25 mg every 6 hours   -Antidepresants/anxiolytics:Citalopram 20 mg daily    3)  Non-medication interventions  Positioning, ICE, Relaxation, Distraction with TV, activities, Essential oils per nursing, Physical therapy as ordered by primary team    4)  Constipation Prophylaxis  Senna-docusate 1-2 tablet BID    5)  Pain Education  -Opioid safe use, storage and disposal information included in DC AVS    6)  DC Planning   Discussed goal of Opioid therapy as above with patient and bedside RN  Continued outpatient management of pain per Dr. Christian Love  Disposition: home with assist  Support systems: adult children  Outpatient Referrals:none    ASSESSMENT  1)  Acute on chronic low back pain with radicular symptoms    2)  Patient with chronic low back pain, on chronic opioid therapy managed by Dr. Christian Love  Baseline 40 mg Daily Morphine Equivalent as dispensed and as reported daily use.  Patient has an expected opioid tolerance.     Patient's opioid  use in past 24 hours: PO oxycodone 150 mg, IV dilaudid 2 mg = 290 mg Daily Morphine Equivalent    3)  Risk factors for opioid related harms  -High opioid dose (>50 MME/day)    4)  Opioid induced side-effects:  -Constipation - on baseline prophylaxis    5)  Other/Related:    - chronic pain related anxiety    Time Spent on this Encounter   Total unit/floor time 45 minutes, time consisted of the following, examination of the patient, reviewing the record and completing documentation. >50% of time spent in counseling and coordination of care.  Time spend counseling with patient consisted of the following topics, symptom management.  Time spent in coordination of care with Bedside Nurse Yuliet Guerra.     Carline Oliver APRN, CNP  Pain Management and Palliative Care  [facility]  Pgr: [phone]      Reason for Consult   Reason for consult: I was asked by Josselin FANG to evaluate this patient for acute post operative pain.    Primary Care Physician   Primary Care Physician:[facility]    Chief Complaint   S/P T-8 to S1 redo fusion and L2-3 fracture.    History is obtained from the patient    History of Present Illness   Josselin Cunha is a 60 year old female who presented with L2-3 fusion fracture, ongoing chronic back pain and left quad weakness.  Patient has had multiple back surgeries with Dr. Love and was seen in his clinic for surgical evaluation.  Patient states her most recent visit was prompted by left sided quad weakness.  States her imaging showed change at her L2-3 fusion.  She states she had been functionally and symptomatically stable until recent months when she went snowmobiling.  She had increased pain following that activity.  Patient states pain is well controlled post operatively.  States numbness and tingling on left, weakness in quad.  Surgical site pain is stable and tolerable.        PAST PAIN TREATMENT:   Medications:oxycontin and oxycodone at  baseline  Non-phamacologic modalities:physical therapy, distraction.  Previous interventions/surgeries: history of 9 spinal fusion surgeries.      [place]  REVIEW completed.  No concern for misuse.  OPIOID RISK SCORE= [phone]    Past Medical History   I have reviewed this patient's medical history and updated it with pertinent information if needed.   Past Medical History:   Diagnosis Date     Acute Crohn's disease (H)      Complication of anesthesia     needs nebs before surgery     COPD (chronic obstructive pulmonary disease) (H)      Drug overdose      Hypertension     resolved, no longer on medication     Noninfectious ileitis     Chrons     Other chronic pain     back     PTSD (post-traumatic stress disorder)      Seizures (H)     last seizure 2017     Sleep apnea     not tested but thinks she has it     Uncomplicated asthma        Past Surgical History   I have reviewed this patient's surgical history and updated it with pertinent information if needed.  Past Surgical History:   Procedure Laterality Date     ABDOMEN SURGERY      4 c-sections     BACK SURGERY      4 back surgeries     COLONOSCOPY       DECOMPRESSION, FUSION LUMBAR POSTERIOR THREE + LEVELS, COMBINED N/A 9/12/2019    Procedure: 1.  Removal of prior instrumentation, L3, L4, L5.  2.  Exploration of spinal fusion.  3.  Repeat posterolateral fusion at L3-L4.  4.  Posterior fusion, T11-T12, L1, L2, L3.  5.  Segmental instrumentation T11-12, L2, L2, L3 L4, L5 and sacrum using iliac screws.;  Surgeon: Christian Love MD;  Location: [facility] OR     DECOMPRESSION, FUSION LUMBAR POSTERIOR TWO LEVELS, COMBINED N/A 1/17/2019    Procedure: L3 - L4  oblique lateral lumbar interbody fusion  L3 Posterior minimally invasive pedicle screw placement and posterolateral instrumentation L3 to L4  and  Posterior fusion  cut off old maine below L4 and remove old screws at L4 bilateral and replacement;  Surgeon: Christian Love MD;  Location:  OR     EXPLORE SPINE, REMOVE HARDWARE,  COMBINED N/A 9/12/2019    Procedure: Explore spine, remove hardware, combined;  Surgeon: Christian Love MD;  Location: [facility] OR     FUSION SPINE POSTERIOR THORACIC THREE + LEVELS N/A 7/30/2020    Procedure: T7 through T12 posterolateral fusion.  2.  Revision instrumentation T7 through sacrum with segmental fixation with new pedicle screws at T8, T9, T10 connecting to the old fusion by removing top 7 screws of the prior fusion mass.;  Surgeon: Christian Love MD;  Location: [facility] OR     GI SURGERY  2014    bowel resection for crohns     ORTHOPEDIC SURGERY Bilateral     knee replacement         Prior to Admission Medications   Prior to Admission Medications   Prescriptions Last Dose Informant Patient Reported? Taking?   SUMAtriptan (IMITREX) 100 MG tablet More than a month at Unknown time  Yes No   Sig: Take 100 mg by mouth at onset of headache for migraine   albuterol (PROAIR HFA/PROVENTIL HFA/VENTOLIN HFA) 108 (90 Base) MCG/ACT inhaler 3/11/2021 at 0515  Yes Yes   Sig: Inhale 2 puffs into the lungs every 4 hours as needed for shortness of breath / dyspnea or wheezing   albuterol (PROVENTIL) (2.5 MG/3ML) 0.083% neb solution Past Month at Unknown time  Yes Yes   Sig: Take 2.5 mg by nebulization every 6 hours as needed for shortness of breath / dyspnea or wheezing   carisoprodol (SOMA) 350 MG tablet 3/10/2021 at Unknown time  Yes Yes   Sig: Take 350 mg by mouth 2 times daily as needed for muscle spasms   citalopram (CELEXA) 20 MG tablet 3/11/2021 at Unknown time  Yes Yes   Sig: Take 20 mg by mouth daily    gabapentin (NEURONTIN) 300 MG capsule 3/10/2021 at Unknown time  Yes Yes   Sig: Take 600 mg by mouth every evening    gabapentin (NEURONTIN) 300 MG capsule 3/11/2021 at Unknown time  Yes Yes   Sig: Take 300 mg by mouth every morning   levETIRAcetam (KEPPRA) 1000 MG tablet 3/10/2021 at Unknown time  Yes Yes   Sig: Take 1,000 mg by mouth every evening    levETIRAcetam (KEPPRA) 500 MG tablet 3/11/2021 at Unknown time  Yes  Yes   Sig: Take 500 mg by mouth every morning   mometasone (ASMANEX TWISTHALER) 220 MCG/INH inhaler 3/11/2021 at Unknown time  Yes Yes   Sig: Inhale 1-2 puffs into the lungs every morning   oxyCODONE (OXY-IR) 5 MG capsule 3/10/2021 at Unknown time  Yes Yes   Sig: Take 5 mg by mouth every 6 hours as needed for severe pain   oxyCODONE (OXYCONTIN) 10 MG 12 hr tablet 3/10/2021 at Unknown time  Yes Yes   Sig: Take 10 mg by mouth every 12 hours      Facility-Administered Medications: None     Allergies   Allergies   Allergen Reactions     Tramadol      Instructed not to take per neurologist exacerbates seizures     Cefaclor Hives       Social History   I have reviewed this patient's social history and updated it with pertinent information if needed. Josselin Cunha  reports that she quit smoking about 2 years ago. She has never used smokeless tobacco. She reports that she does not drink alcohol or use drugs.    Family History   I have reviewed this patient's family history and updated it with pertinent information if needed.   Family History   Problem Relation Age of Onset     Diabetes Mother      Atrial fibrillation Mother      Diabetes Father      Review of Systems   The 10 point Review of Systems is negative other than noted in the HPI or here.    Denies Bowel or bladder dysfunction    Physical Exam   Temp:  [97.2  F (36.2  C)-99.9  F (37.7  C)] 97.9  F (36.6  C)  Pulse:  [69-98] 80  Resp:  [7-19] 16  BP: ()/(57-92) 105/58  SpO2:  [90 %-100 %] 100 %  183 lbs 0 oz  GEN:  Alert, oriented x 3, appears comfortable, No apparent distress.  HEENT:  Normocephalic/atraumatic, no scleral icterus, no nasal discharge, mouth moist.  CV:  RRR, S1, S2; no murmurs or other irregularities noted.  +3 DP/PT pulses bilaterally; no edema bilateral lower extremeties.  RESP:  Clear to auscultation bilaterally without rales/rhonchi/wheezing/retractions.  Symmetric chest rise on inhalation noted.  Normal respiratory effort.  ABD:   Rounded, soft, non-tender/non-distended.  +BS  EXT:  Edema & pulses as noted above.  Color, moisture and sensation intact x 4.     SKIN:  Dry to touch, no exanthems noted in the visualized areas.    NEURO: Symmetric strength +5/5.  Sensation to touch intact all extremities.   There is no area of allodynia or hyperesthesia.  PAIN BEHAVIOR: Cooperative  Psych:  Normal affect.  Calm, cooperative, conversant appropriately.       Data   Results for orders placed or performed during the hospital encounter of 03/11/21 (from the past 24 hour(s))   XR Surgery NORRIS L/T 5 Min Fluoro w Stills    Narrative    This exam was marked as non-reportable because it will not be read by a   radiologist or a [place] non-radiologist provider.         Basic metabolic panel   Result Value Ref Range    Sodium 139 133 - 144 mmol/L    Potassium 4.3 3.4 - 5.3 mmol/L    Chloride 105 94 - 109 mmol/L    Carbon Dioxide 31 20 - 32 mmol/L    Anion Gap 3 3 - 14 mmol/L    Glucose 132 (H) 70 - 99 mg/dL    Urea Nitrogen 10 7 - 30 mg/dL    Creatinine 0.56 0.52 - 1.04 mg/dL    GFR Estimate >90 >60 mL/min/[1.73_m2]    GFR Estimate If Black >90 >60 mL/min/[1.73_m2]    Calcium 8.0 (L) 8.5 - 10.1 mg/dL   Hemoglobin   Result Value Ref Range    Hemoglobin 9.5 (L) 11.7 - 15.7 g/dL

## 2021-03-12 NOTE — PLAN OF CARE
Afeb, vss, left leg weakness noted by surgeon on rounds this morning and decided to keep her overnight. Drainage coming from 1 staples toward the top of her incision. Dressing changed twice this shift. Surgeon called and informed of the drainage, just said it was expected since no drain placed. He said to continue to change dressings as needed. Dimas out this morning, did void 3 times now. Up with assist of 1 and brace and either cane or walker. Did get up by herself after OT just in there and pt made it back from the BR but found on her knees at bedside. Said her left knee just gave out. Denied any pain, Dr kip informed.

## 2021-03-12 NOTE — CONSULTS
"CLINICAL NUTRITION SERVICES  -  ASSESSMENT NOTE      MALNUTRITION:  % Weight Loss:  Up to 1-2% in 1 week (non-severe malnutrition)  % Intake:  <75% for > 7 days (non-severe malnutrition)  Subcutaneous Fat Loss:  None observed   Muscle Loss:  Temporal region mild depletion, Clavicle bone region mild depletion and Acromion bone region mild depletion  Fluid Retention: None documented    Malnutrition Diagnosis: Non-Severe malnutrition  In Context of:  Acute illness or injury with chronic illness or disease        REASON FOR ASSESSMENT  Josselin Cunha is a 60 year old female seen by Registered Dietitian for Admission Nutrition Risk Screen for positive.    PMH of: Obesity.    Admit 2/2: Spinal fusions.     NUTRITION HISTORY  - Information obtained from patient and chart.    - Diet at home: Regular, aims for low CHO as able given  is diabetic \"with a bad A1C\".    - Usual intakes: Meals BID-TID.  Feels she has been skipping meals or having smaller portions over the past few weeks d/t pain.    - Barriers to PO intakes: Pain, decreased appetite.   - Use of oral supplements: None.  - Allergies: NKFA.      CURRENT NUTRITION ORDERS  Diet Order:     Regular    Current Intake/Tolerance:  Limited timeframe since admission.        NUTRITION FOCUSED PHYSICAL ASSESSMENT FOR DIAGNOSING MALNUTRITION)  Yes     Obtained from Chart/Interdisciplinary Team:  - No documentation of PI  - Stooling patterns reviewed    ANTHROPOMETRICS  Height: 5' 2.992\"  Weight: 183 lbs 0 oz  Body mass index is 32.43 kg/m .  Weight Status:  Obesity Grade I BMI 30-34.9  Weight History:  Wt Readings from Last 10 Encounters:   03/11/21 83 kg (183 lb)   07/30/20 94.3 kg (208 lb)   09/12/19 88.9 kg (196 lb)   01/17/19 97.5 kg (215 lb)     - 186# from 1/26/2021  - 190# from 2/26/2021 --> 4% wt loss in 2 weeks.  Confirmed by patient.      LABS  Labs reviewed:  Electrolytes  Potassium (mmol/L)   Date Value   07/31/2020 3.9   09/13/2019 4.4    Blood " Glucose  Glucose (mg/dL)   Date Value   08/01/2020 100 (H)   07/31/2020 120 (H)   09/14/2019 137 (H)   09/13/2019 122 (H)    Inflammatory Markers  No results found for: CRP, WBC, ALBUMIN   Sodium (mmol/L)   Date Value   07/31/2020 137   09/13/2019 140    Renal  Urea Nitrogen (mg/dL)   Date Value   07/31/2020 9   09/13/2019 18     Creatinine (mg/dL)   Date Value   07/31/2020 0.54   09/13/2019 0.54     Additional  No results found for: TRIG, URINEKETONE     B/P: 105/58, T: 97.9, P: 80, R: 16      MEDICATIONS  Medications reviewed:    acetaminophen  975 mg Oral Q8H     citalopram  20 mg Oral Daily     cyclobenzaprine  10 mg Oral TID     famotidine  20 mg Oral BID    Or     famotidine  20 mg Intravenous BID     gabapentin  300 mg Oral TID     hydrOXYzine  25 mg Oral Q6H     ketorolac  30 mg Intravenous Q6H     levETIRAcetam  1,000 mg Oral QPM     levETIRAcetam  500 mg Oral QAM     mometasone  1-2 puff Inhalation QAM     oxyCODONE  20 mg Oral Q12H     senna-docusate  1 tablet Oral BID    Or     senna-docusate  2 tablet Oral BID     sodium chloride (PF)  3 mL Intracatheter Q8H        0.9% sodium chloride + KCl 20 mEq/L Stopped (03/12/21 0649)          ASSESSED NUTRITION NEEDS PER APPROVED PRACTICE GUIDELINES:    Dosing Weight 83 kg   Estimated Energy Needs: 25-30 Kcal/Kg  Justification: maintenance  Estimated Protein Needs: 1-1.2+ g pro/Kg  Justification: preservation of LBM  Estimated Fluid Needs: per MD      NUTRITION DIAGNOSIS:  Inadequate oral intake related to decreased appetite with pain as evidenced by meeting <75% needs over a period of weeks, 4% wt loss during this time, muscle loss, malnutrition coding.    NUTRITION INTERVENTIONS  Recommendations / Nutrition Prescription  Diet per MD.  Self-selection of protein focus as able.    Boost between meals BID.      Implementation  Nutrition education: Provided education on importance of protein.  Food sources and supplements.  Discussed scheduling of Boost per  request.    Medical Food Supplement: As above.     Collaboration and Referral of Nutrition care: Discussed POC with RN.    Nutrition Goals  Patient to consume at least 50-75% of meals or supplements TID.        MONITORING AND EVALUATION:  Progress towards goals will be monitored and evaluated per protocol and Practice Guidelines          Ivory Sierra RDN, LD  Clinical Dietitian  3rd floor/ICU: [phone]  All other floors: [phone]  Weekend/holiday: [phone]

## 2021-03-12 NOTE — PROGRESS NOTES
03/12/21 1409   Quick Adds   Type of Visit Initial Occupational Therapy Evaluation   Living Environment   People in home spouse   Current Living Arrangements house   Living Environment Comments will use tub/shower combination, has standard height toilet. Patient reports her dtr, who are nurses, will be taking turns weekly to be with her to A during recovery.   Self-Care   Regular Exercise Yes   Activity/Exercise Type swimming   Exercise Amount/Frequency 3-5 times/wk   Equipment Currently Used at Home cane, straight;walker, rolling   Disability/Function   Fall history within last six months yes   Number of times patient has fallen within last six months 1   Change in Functional Status Since Onset of Current Illness/Injury yes   General Information   Onset of Illness/Injury or Date of Surgery 03/11/21   Referring Physician Josselin Ryan PA-C   Patient/Family Therapy Goal Statement (OT) to return home tomorrow   Additional Occupational Profile Info/Pertinent History of Current Problem Patient is POD #1 for L2 L3 oblique lateral lumbar interbody fusion with discectomy, T8 to S1  posterior fusion and revision instrumentation from T8 to S1 with removal of bilateral loose iliac screws and replacement of loose screws from L2 to S1 with new LNK screws   Existing Precautions/Restrictions spinal;brace worn when out of bed  (TLSO)   General Observations and Info patient was in bed and agreeable to OT session   Cognitive Status Examination   Orientation Status orientation to person, place and time   Visual Perception   Visual Impairment/Limitations corrective lenses for reading   Sensory   Sensory Comments numbness in L UE/hand into knee   Pain Assessment   Patient Currently in Pain Yes, see Vital Sign flowsheet  (rating pain 7/10)   Posture   Posture not impaired   Range of Motion Comprehensive   General Range of Motion no range of motion deficits identified   Strength Comprehensive (MMT)   Comment, General Manual Muscle  Testing (MMT) Assessment increased weakness in L LE, surgery team aware   Muscle Tone Assessment   Muscle Tone Quick Adds No deficits were identified   Coordination   Upper Extremity Coordination No deficits were identified   Transfers   Transfer Comments not completed, patient was too fatigued to participate in activity   Instrumental Activities of Daily Living (IADL)   IADL Comments patient reports her spouse and dtr's will complete IADLs as needed   Clinical Impression   Criteria for Skilled Therapeutic Interventions Met (OT) yes;meets criteria;skilled treatment is necessary   OT Diagnosis decrease ADLS/IADLs   OT Problem List-Impairments impacting ADL activity tolerance impaired;strength;post-surgical precautions;pain   Assessment of Occupational Performance 5 or more Performance Deficits   Identified Performance Deficits decreased ADL/IADL-dsg, toileing, bathing, functional/community mobility, household chores, driving, errands   Planned Therapy Interventions (OT) ADL retraining;transfer training;progressive activity/exercise   Clinical Decision Making Complexity (OT) low complexity   Therapy Frequency (OT) Daily   Predicted Duration of Therapy 2-3 days   Anticipated Equipment Needs Upon Discharge (OT) reacher;bathing equipment   Risk & Benefits of therapy have been explained evaluation/treatment results reviewed;care plan/treatment goals reviewed;risks/benefits reviewed;patient;participants voiced agreement with care plan   OT Discharge Planning    OT Discharge Recommendation (DC Rec) Home with assist   OT Rationale for DC Rec anticipate patient will meet needed goals for safe return home with A for IADLs- cooking, cleaning, laundry, driving, shopping and A with dressing and TLSO management from family and friends   Total Evaluation Time (Minutes)   Total Evaluation Time (Minutes) 5

## 2021-03-12 NOTE — PROGRESS NOTES
Vitals stable.  Some left quad weakness.  No leg pain.   Low back pain better control.  Ambulate with walker.  Most likely home tomorrow.  Hgb 9.5.

## 2021-03-12 NOTE — CONSULTS
Hospitalist Consultation      Josselin Cunha MRN# [id number]   YOB: 1960 Age: 60 year old   Date of Admission: 3/11/2021     Requesting Physician:  Dr. Love  Reason for consult: Post-op medical management            Assessment and Plan:   This patient is a 60 year old female who is POD #1 s/p L2-3 lateral fusion and discectomy due to L2-3 fusion fx and T8-S1 posterior fusion revision.   Overall doing well, no complaints. Pain controlled. Having significant amount of drainage from superior end of incision.     1. s/p L2-3 lateral fusion and discectomy due to L2-3 fusion fx and T8-S1 posterior fusion revision - pain, prophylaxis, diet and PT/OT per spine surgery. Pt notes right leg weakness that is slightly worse than baseline. Significant bloody drainage from superior end of incision, able to express additional bloody drainage. Per nursing, dressing changed at least once every shift. Nursing to notify surgery team for further recommendations. Recheck Hgb in AM  2. COPD - resume albuterol prn inhaler, as well as mometasone inhaler. May use own home supply.   3. Seizure disorder - managed with Keppra, resume  4. Crohn's disease - not on any immune modulating medications               History of Present Illness:   This patient is a 60 year old female who is POD #1 s/p L2-3 lateral fusion and discectomy due to L2-3 fusion fx and T8-S1 posterior fusion revision. She underwent a previous T9-S1 fusion and had a fall 5-6 months ago sustaining a fx to the L2-3 fusion. She has failed conservative outpatient management so presents here for elective L2-3 lateral fusion and discectomy due to L2-3 fusion fx and T8-S1 posterior fusion revision. She tolerated the surgery well, pain is well controlled, has had adequate I&Os, and she is tolerating PO intake. Dimas is out and she has voided a small amount. She is not passing gas or had a BM yet. She is having significant drainage from her incision and she notes mildly  increased right sided weakness. She denies fever, chills, chest pain, SOB, cough, abdominal pain, nausea, vomiting, or diarrhea. She also has a PMHx of COPD, Crohn's disease, GERD and seizure disorder.              Past Medical History:     Past Medical History:   Diagnosis Date     Acute Crohn's disease (H)      Complication of anesthesia     needs nebs before surgery     COPD (chronic obstructive pulmonary disease) (H)      Drug overdose      Hypertension     resolved, no longer on medication     Noninfectious ileitis     Chrons     Other chronic pain     back     PTSD (post-traumatic stress disorder)      Seizures (H)     last seizure 2017     Sleep apnea     not tested but thinks she has it     Uncomplicated asthma                Past Surgical History:     Past Surgical History:   Procedure Laterality Date     ABDOMEN SURGERY      4 c-sections     BACK SURGERY      4 back surgeries     COLONOSCOPY       DECOMPRESSION, FUSION LUMBAR POSTERIOR THREE + LEVELS, COMBINED N/A 9/12/2019    Procedure: 1.  Removal of prior instrumentation, L3, L4, L5.  2.  Exploration of spinal fusion.  3.  Repeat posterolateral fusion at L3-L4.  4.  Posterior fusion, T11-T12, L1, L2, L3.  5.  Segmental instrumentation T11-12, L2, L2, L3 L4, L5 and sacrum using iliac screws.;  Surgeon: Christian Love MD;  Location: [facility] OR     DECOMPRESSION, FUSION LUMBAR POSTERIOR TWO LEVELS, COMBINED N/A 1/17/2019    Procedure: L3 - L4  oblique lateral lumbar interbody fusion  L3 Posterior minimally invasive pedicle screw placement and posterolateral instrumentation L3 to L4  and  Posterior fusion  cut off old maine below L4 and remove old screws at L4 bilateral and replacement;  Surgeon: Christian Love MD;  Location: [facility] OR     EXPLORE SPINE, REMOVE HARDWARE, COMBINED N/A 9/12/2019    Procedure: Explore spine, remove hardware, combined;  Surgeon: Christian Love MD;  Location: [facility] OR     FUSION SPINE POSTERIOR THORACIC THREE + LEVELS N/A 7/30/2020     Procedure: T7 through T12 posterolateral fusion.  2.  Revision instrumentation T7 through sacrum with segmental fixation with new pedicle screws at T8, T9, T10 connecting to the old fusion by removing top 7 screws of the prior fusion mass.;  Surgeon: Christian Love MD;  Location: [facility] OR     GI SURGERY      bowel resection for crohns     ORTHOPEDIC SURGERY Bilateral     knee replacement                 Social History:     Social History     Tobacco Use     Smoking status: Former Smoker     Quit date: 2018     Years since quittin.3     Smokeless tobacco: Never Used   Substance Use Topics     Alcohol use: No     Frequency: Never     Drug use: No                 Family History:   I have reviewed this patient's family history  family history includes Atrial fibrillation in her mother; Diabetes in her father and mother.            Allergies:     Allergies   Allergen Reactions     Tramadol      Instructed not to take per neurologist exacerbates seizures     Cefaclor Hives             Medications:     Prior to Admission medications    Medication Sig Last Dose Taking? Auth Provider   albuterol (PROAIR HFA/PROVENTIL HFA/VENTOLIN HFA) 108 (90 Base) MCG/ACT inhaler Inhale 2 puffs into the lungs every 4 hours as needed for shortness of breath / dyspnea or wheezing 3/11/2021 at 0515 Yes Reported, Patient   albuterol (PROVENTIL) (2.5 MG/3ML) 0.083% neb solution Take 2.5 mg by nebulization every 6 hours as needed for shortness of breath / dyspnea or wheezing Past Month at Unknown time Yes Reported, Patient   carisoprodol (SOMA) 350 MG tablet Take 350 mg by mouth 2 times daily as needed for muscle spasms 3/10/2021 at Unknown time Yes Unknown, Entered By History   citalopram (CELEXA) 20 MG tablet Take 20 mg by mouth daily  3/11/2021 at Unknown time Yes Unknown, Entered By History   gabapentin (NEURONTIN) 300 MG capsule Take 300 mg by mouth every morning 3/11/2021 at Unknown time Yes Reported, Patient   gabapentin  "(NEURONTIN) 300 MG capsule Take 600 mg by mouth every evening  3/10/2021 at Unknown time Yes Reported, Patient   levETIRAcetam (KEPPRA) 1000 MG tablet Take 1,000 mg by mouth every evening  3/10/2021 at Unknown time Yes Reported, Patient   levETIRAcetam (KEPPRA) 500 MG tablet Take 500 mg by mouth every morning 3/11/2021 at Unknown time Yes Unknown, Entered By History   mometasone (ASMANEX TWISTHALER) 220 MCG/INH inhaler Inhale 1-2 puffs into the lungs every morning 3/11/2021 at Unknown time Yes Unknown, Entered By History   oxyCODONE (OXY-IR) 5 MG capsule Take 5 mg by mouth every 6 hours as needed for severe pain 3/10/2021 at Unknown time Yes Reported, Patient   oxyCODONE (OXYCONTIN) 10 MG 12 hr tablet Take 10 mg by mouth every 12 hours 3/10/2021 at Unknown time Yes Reported, Patient   SUMAtriptan (IMITREX) 100 MG tablet Take 100 mg by mouth at onset of headache for migraine More than a month at Unknown time  Reported, Patient               Review of Systems:   A comprehensive greater than 10 system review of systems was carried out.  Pertinent positives and negatives are noted above.  Otherwise negative for contributory info.            Physical Exam:   Vitals were reviewed  Blood pressure 105/58, pulse 80, temperature 97.9  F (36.6  C), temperature source Temporal, resp. rate 16, height 1.6 m (5' 2.99\"), weight 83 kg (183 lb), SpO2 100 %.  Exam:    GENERAL:  Comfortable.  PSYCH: pleasant, oriented, No acute distress.  HEENT:  Normal conjunctiva, normal hearing, nasal mucosa and Oropharynx are normal.  NECK:  Supple, no neck vein distention  HEART:  Normal S1, S2 with no murmur, no pericardial rub, S3 or S4.  LUNGS:  Clear to auscultation, normal Respiratory effort.  GI:  Soft, normal bowel sounds.  EXTREMITIES:  No pedal edema, +2 pulses bilateral and equal.  Posterior thoracic dressing is saturated in the center with blood, active drainage noted from superior end of incision, able to easily express additional " blood.  SKIN:  Dry to touch, No rash, wound or ulcerations.  NEUROLOGIC:  Nonfocal with normal cranial nerve and motor power and sensation.            Data:   Past 24 hours labs, studies, and imaging were reviewed.  Recent Labs   Lab 03/12/21  0739   HGB 9.5*     Recent Labs   Lab 03/12/21  0739      POTASSIUM 4.3   CHLORIDE 105   CO2 31   ANIONGAP 3   *   BUN 10   CR 0.56   GFRESTIMATED >90   GFRESTBLACK >90   CHARLES 8.0*       Yue Hunt PA-C    Pt discussed with Dr. Anne who agrees with the care as discussed above.

## 2021-03-12 NOTE — PROGRESS NOTES
03/12/21 0936   Quick Adds   Type of Visit Initial PT Evaluation   Living Environment   People in home spouse   Current Living Arrangements house   Home Accessibility stairs to enter home   Number of Stairs, Main Entrance 3   Stair Railings, Main Entrance railings safe and in good condition   Self-Care   Activity/Exercise/Self-Care Comment Pt uses a Quad cane at baseline, has a 4WW as well   General Information   Onset of Illness/Injury or Date of Surgery 03/11/21   Referring Physician Josselin Ryan PA-C   Pertinent History of Current Problem (include personal factors and/or comorbidities that impact the POC) Pt is status post lumbar two to lumbar three, transforaminal lumbar interbody fusion/interbody and posteriolateral fusion, minimally invasive versus open.  thoracic 8 to sacral one revision of fusion, with instrumentation, with use of infuse.   Existing Precautions/Restrictions brace worn when out of bed;spinal   Pain Assessment   Patient Currently in Pain Yes, see Vital Sign flowsheet   Posture    Posture Forward head position;Protracted shoulders   Range of Motion (ROM)   ROM Comment WFL   Strength   Strength Comments limited L hip flexion and knee extension 3-/5, compared to 4/5 on the R. Pt reports new following surgery but endorses some mild weakness on the L prior to surgery.   Transfers   Transfer Safety Comments CGA   Gait/Stairs (Locomotion)   Comment (Gait/Stairs) CGA, able to demo safe pregait skills with wt shifting, needing B UE support, L LE becoming more fatigued with increased ambulation, tolerating 100 feet of ambulation   Balance   Balance Comments mildly unsteady with increased ambulation due to L quad weakness   Sensory Examination   Sensory Perception Comments hypersensitivity   Clinical Impression   Criteria for Skilled Therapeutic Intervention yes, treatment indicated   PT Diagnosis (PT) decreased functional mobility   Influenced by the following impairments decreased strength, pain,  decreased sensation   Functional limitations due to impairments decreased transfers, ambulation, stairs, bed mob   Clinical Presentation Stable/Uncomplicated   Clinical Presentation Rationale improving   Clinical Decision Making (Complexity) low complexity   Therapy Frequency (PT) 2x/day   Predicted Duration of Therapy Intervention (days/wks) 2 days   Planned Therapy Interventions (PT) stair training;bed mobility training;gait training;home exercise program;transfer training;patient/family education   Risk & Benefits of therapy have been explained evaluation/treatment results reviewed;care plan/treatment goals reviewed;risks/benefits reviewed;current/potential barriers reviewed;participants voiced agreement with care plan;participants included;patient   PT Discharge Planning    PT Discharge Recommendation (DC Rec) home with assist   PT Rationale for DC Rec Pt has good support from family at home. Pt likely to meet goals with family support for brace donning, min A for bed mob, SBA for transfers and ambulation, likely min A for stairs.    PT Brief overview of current status  Pt able to ambulate 100 feet with FWW, mild L quad strength   Total Evaluation Time   Total Evaluation Time (Minutes) 10

## 2021-03-12 NOTE — SIGNIFICANT EVENT
"Went in to check on patient and found her kneeling on the ground in front of the bed. She stated she had gone into the bathroom and wanted to try doing it herself. She stated \"OT was here and just left and I thought I could do it myself\". \"I know I should have called for help\". Assisted patient to standing position and back to bed. Denied any pain. Said her baseline LLE weakness cause her leg to buckle when walking back from the bathroom. Physician (Dr. Love) was notified and no new orders. Stated to remind patient to call for help and use brace when ambulating.   "

## 2021-03-12 NOTE — PLAN OF CARE
Pt A&O, vitals monitored on 2L O2.  Dimas in place and patent.  Pain managed with oxycontin, oxycodone, flexeril, atarax, toradol, IV dilaudid.  CMS intact.  Dressing to back CDI.  Pt not OOB due to pain control issues.  IV infusing.  Will continue to monitor.
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
## 2021-03-12 NOTE — PLAN OF CARE
A&Ox4 but drowsy, VSS on 2L O2. Dimas removed @ 0650, pt DTV. Dressing with large amount of sanguinous drainage, replaced and reinforced. N/T to L side and RUE. Pain controlled w/ oxycontin, oxycodone, flexeril, atarax, toradol, and IV dilaudid. Pt tearful overnight d/t complexity of situation, empathetic listening and support provided by staff. Up in chair for breakfast, GB/BB/cane.

## 2021-03-13 ENCOUNTER — APPOINTMENT (OUTPATIENT)
Dept: PHYSICAL THERAPY | Facility: CLINIC | Age: 61
End: 2021-03-13
Attending: ORTHOPAEDIC SURGERY
Payer: COMMERCIAL

## 2021-03-13 ENCOUNTER — APPOINTMENT (OUTPATIENT)
Dept: OCCUPATIONAL THERAPY | Facility: CLINIC | Age: 61
End: 2021-03-13
Attending: ORTHOPAEDIC SURGERY
Payer: COMMERCIAL

## 2021-03-13 VITALS
HEIGHT: 63 IN | SYSTOLIC BLOOD PRESSURE: 113 MMHG | RESPIRATION RATE: 16 BRPM | BODY MASS INDEX: 32.43 KG/M2 | DIASTOLIC BLOOD PRESSURE: 61 MMHG | WEIGHT: 183 LBS | HEART RATE: 78 BPM | TEMPERATURE: 97.6 F | OXYGEN SATURATION: 95 %

## 2021-03-13 LAB — HGB BLD-MCNC: 9.5 G/DL (ref 11.7–15.7)

## 2021-03-13 PROCEDURE — 250N000013 HC RX MED GY IP 250 OP 250 PS 637: Performed by: ORTHOPAEDIC SURGERY

## 2021-03-13 PROCEDURE — 97535 SELF CARE MNGMENT TRAINING: CPT | Mod: GO | Performed by: REHABILITATION PRACTITIONER

## 2021-03-13 PROCEDURE — 99231 SBSQ HOSP IP/OBS SF/LOW 25: CPT | Performed by: INTERNAL MEDICINE

## 2021-03-13 PROCEDURE — 97116 GAIT TRAINING THERAPY: CPT | Mod: GP | Performed by: PHYSICAL THERAPIST

## 2021-03-13 PROCEDURE — 250N000013 HC RX MED GY IP 250 OP 250 PS 637: Performed by: PHYSICIAN ASSISTANT

## 2021-03-13 PROCEDURE — 97530 THERAPEUTIC ACTIVITIES: CPT | Mod: GP | Performed by: PHYSICAL THERAPIST

## 2021-03-13 PROCEDURE — 36415 COLL VENOUS BLD VENIPUNCTURE: CPT | Performed by: PHYSICIAN ASSISTANT

## 2021-03-13 PROCEDURE — 250N000013 HC RX MED GY IP 250 OP 250 PS 637: Performed by: NURSE PRACTITIONER

## 2021-03-13 PROCEDURE — 85018 HEMOGLOBIN: CPT | Performed by: PHYSICIAN ASSISTANT

## 2021-03-13 PROCEDURE — 250N000011 HC RX IP 250 OP 636: Performed by: PHYSICIAN ASSISTANT

## 2021-03-13 RX ORDER — OXYCODONE AND ACETAMINOPHEN 10; 325 MG/1; MG/1
1 TABLET ORAL EVERY 6 HOURS PRN
Qty: 40 TABLET | Refills: 0 | Status: SHIPPED | OUTPATIENT
Start: 2021-03-13

## 2021-03-13 RX ORDER — OXYCODONE HCL 20 MG/1
20 TABLET, FILM COATED, EXTENDED RELEASE ORAL EVERY 12 HOURS
Qty: 20 TABLET | Refills: 0 | Status: SHIPPED | OUTPATIENT
Start: 2021-03-13 | End: 2021-04-12

## 2021-03-13 RX ADMIN — CITALOPRAM HYDROBROMIDE 20 MG: 20 TABLET ORAL at 08:24

## 2021-03-13 RX ADMIN — ACETAMINOPHEN 975 MG: 325 TABLET, FILM COATED ORAL at 06:48

## 2021-03-13 RX ADMIN — OXYCODONE HYDROCHLORIDE 20 MG: 10 TABLET, FILM COATED, EXTENDED RELEASE ORAL at 08:59

## 2021-03-13 RX ADMIN — OXYCODONE HYDROCHLORIDE 20 MG: 5 TABLET ORAL at 04:46

## 2021-03-13 RX ADMIN — HYDROXYZINE HYDROCHLORIDE 25 MG: 25 TABLET, FILM COATED ORAL at 12:41

## 2021-03-13 RX ADMIN — LEVETIRACETAM 500 MG: 500 TABLET, FILM COATED ORAL at 08:24

## 2021-03-13 RX ADMIN — HYDROXYZINE HYDROCHLORIDE 25 MG: 25 TABLET, FILM COATED ORAL at 00:33

## 2021-03-13 RX ADMIN — HYDROXYZINE HYDROCHLORIDE 25 MG: 25 TABLET, FILM COATED ORAL at 06:49

## 2021-03-13 RX ADMIN — GABAPENTIN 300 MG: 300 CAPSULE ORAL at 08:24

## 2021-03-13 RX ADMIN — OXYCODONE HYDROCHLORIDE 20 MG: 5 TABLET ORAL at 01:36

## 2021-03-13 RX ADMIN — FAMOTIDINE 20 MG: 20 TABLET ORAL at 08:24

## 2021-03-13 RX ADMIN — KETOROLAC TROMETHAMINE 30 MG: 30 INJECTION, SOLUTION INTRAMUSCULAR at 01:36

## 2021-03-13 RX ADMIN — OXYCODONE HYDROCHLORIDE 20 MG: 5 TABLET ORAL at 11:30

## 2021-03-13 RX ADMIN — METHOCARBAMOL 500 MG: 500 TABLET ORAL at 08:24

## 2021-03-13 RX ADMIN — OXYCODONE HYDROCHLORIDE 20 MG: 5 TABLET ORAL at 08:24

## 2021-03-13 RX ADMIN — KETOROLAC TROMETHAMINE 30 MG: 30 INJECTION, SOLUTION INTRAMUSCULAR at 08:25

## 2021-03-13 RX ADMIN — DOCUSATE SODIUM 50 MG AND SENNOSIDES 8.6 MG 2 TABLET: 8.6; 5 TABLET, FILM COATED ORAL at 08:23

## 2021-03-13 RX ADMIN — METHOCARBAMOL 500 MG: 500 TABLET ORAL at 12:41

## 2021-03-13 ASSESSMENT — ACTIVITIES OF DAILY LIVING (ADL)
ADLS_ACUITY_SCORE: 14
ADLS_ACUITY_SCORE: 13

## 2021-03-13 NOTE — DISCHARGE SUMMARY
This patient underwent following surgery for follwing reasons:    REPORT OF OPERATION  Operative Date: 3/11/2021  PRE-PROCEDURE DIAGNOSIS:  1) L2 L3 fusion fracture and prior fusion from T9 to S1  2) Obesity BMI 32;  Higher BMI caused more OR time and made the procedure more difficult  POST-PROCEDURE DIAGNOSIS:  1) Same as above  PROCEDURE PERFORMED:  1) L2 L3 oblique lateral lumbar interbody fusion with discectomy, preparation of the endplate and placement of a bullet cage packed with osteoAMP anterior to the transverse process in modified prone position, with intraoperative biplanar fluoroscopic imaging and electrophysiological monitoring.  2) T8 to S1  Posterior fusion with INFUSE and OsteoAmp  3) Revision instrumentation from T8 to S1 with removal of bilateral loose iliac screws and replacement of loose screws from L2 to S1 with new LNK screws     Surgeon: Christian Love MD     Assistant:Josselin Ryan PAC     Attestation for Assistant: This surgery is a complex spine surgery and an assistant is needed for safety and efficiency of surgery, assistant helps with positioning, setup, draping and technical steps during the surgery with approach. Assistant put complex instrumentation together and assure proper function of each instrument, during surgery Assistant helps as well with retraction and proper operative setup, in the end phase Assistant helps with closure directly.        HISTORY: Please refer to my clinic note for full details, but in short, patient is a 60 -year-old female with severe back pain and radiculopathy not responding to usual conservative therapy. Patient was set up for the surgery as mentioned above and was taken to surgery as mentioned above after all risks and benefits were explained.  PROCEDURE:  The patient was taken to surgery. After general anesthesia was applied, SCDs and Dimas placed and preoperative antibiotic given, then patient has been positioned on the Gilberto table and Timothy frame in a  modified prone position for ease of access from the left side.  AP and lateral fluoroscopic images are positioned. Patient has been prepped and draped in sterile fashion. The landmarks, including Spinal process, transverse process, disk space, endplates and pedicels are identified and marked.  A Jamshidi needle is placed in upper right pedicle inside of the vertebral body and bone marrow has been aspirated to be mixed with biologics to introduce Stem cells to the biologics.  Following steps are then taken for levels:     L2/3  Cage size 12 mm high and 30 mm long Titanium     The patient was turned using the rotation of the surgical table so that a near direct anterior-lateral approach to the lumbar spine could be achieved. A small incision was then made superior to the mid iliac crest and then using biplanar fluoroscopic visualization, under electrophysiological monitoring and stimulation, we introduced an electrophysiological probe through the retro peritoneal space into the desired discs anterior to the transverse processes and then passed it into the disc space after finding a silent window. The sleeve is retained and the probe is removed, then a K wire was passed sequentially into the disk space. A dilating tube was then passed along this same route. Following this, a working channel was then passed sequentially into the disc spaces. The working channel was manually held in position while a series of disc cleaning tools was passed through the channel to remove the affected discs under clear and direct biplanar fluoroscopic visualization, decompress the nerve roots, and decorticate the vertebral endplates at those segments.  Arthrodesis of the intervertebral spaces via an anterior retroperitoneal exposure and application of an intervertebral biomechanical device was then accomplished by using the working channel that had been placed in the retroperitoneal space anterior to the transverse processes. After adequate  decompression and preparation of the endplates, we then put osteoAMP in saline  anterior into disc space and then a interbody was packed tightly with allograft bone for stabilization and arthrodesis of the intervertebral spaces and inserted into the mid portion of the intervertebral discs. This was done under biplanar fluoroscopic visualization. All bone was confined to the borders of the disc space. The working channel was then removed.  Physiological Decompression of foramen, lateral recess and spinal canal is achieved by restoring the anatomical distance of inter discal space and increasing inter pedicular distance with interbody graft.     After the inseertion of the OLLIF titanium cage then the small lateral incision closed.     Then incision was made from T8 to S1.  Bilateral loose iliac screws removed.  Left one was fractured at the head.  Left L4 L5 S1 screws were replaced.  Right L5 screw was replaced.  Right L3 screw was replaced.  The remainder of the prior screws were tightened down by driving them in a few turns.     All the scar tissue was removed from the fusion mass from T8 to S1 and decortication with drill was done from T8 to S1 especially at L2 L3 and lower thoracic prior fusion area and also at L4 L5 area due to some gas formation in the disc space at L4 L5.     Over the decorticated areas INFUSE Large does kit  plus osteoAmp were placed from T8 to S1.     Old 4 rods removed and two new rods were engaged from T8 to S1 on the left and T8 to L5 on the right. Good secure fixation was achieved. Final C arm images showed good position of the hardware.     Wound was closed with interrupted sutures in multiple layers.  Skin staples were used.  2 gram of vanco powder was sprayed into the wound prior to the closure.       Christian Love MD    [facility] Course:    Postop she did well except some left quad weakness related to L2 L3 cage insertion.  This was improving POD #2.  She was able to ambulate well using a  walker wearing TLSO and the pain was under good control with oxycotnin 20mg bid and percocet 10mg prn.  She was discharged home on the same meds with further followup with staple removal in about 10 days in the clinic in [place].

## 2021-03-13 NOTE — PLAN OF CARE
A&Ox4 but inconsistently following commands. A1 with gb, walker and brace, encouraging to use walker. Toelrating diet well. CMS intact ex baseline numbness/weakness to LLE and baseline numbness to fingers. Dressing has small amt of drainage. Reminded pt to not remove surgical dressing. Voiding adequately. Passing gas. Discharge home today. AVS done and meds for discharge oxycontin and oxycodone. Reviewed medications and the long acting vs short acting, pt verbalized understanding.

## 2021-03-13 NOTE — PLAN OF CARE
Physical Therapy Discharge Summary    Reason for therapy discharge:    Discharged to home.    Progress towards therapy goal(s). See goals on Care Plan in [facility] electronic health record for goal details.  Goals partially met.  Barriers to achieving goals:   discharge from facility.    Therapy recommendation(s):    No further therapy is recommended.

## 2021-03-13 NOTE — PROGRESS NOTES
"[facility]    Medicine Progress Note - Hospitalist Service       Date of Admission:  3/11/2021  Length of stay: 2 days    Assessment & Plan   60 year old female who is POD #2 s/p L2-3 lateral fusion and discectomy due to L2-3 fusion fx and T8-S1 posterior fusion revision.     Patient OK for discharge from hospitalist standpoint. Will not make any changes to her home medications.     1. s/p L2-3 lateral fusion and discectomy due to L2-3 fusion fx and T8-S1 posterior fusion revision - pain, prophylaxis, diet and PT/OT per spine surgery.   2. COPD - resume albuterol prn inhaler, as well as mometasone inhaler.   3. Seizure disorder - managed with Keppra resume  4. Crohn's disease - not on any immune modulating medications    Hadley Anne MD  Hospitalist Service  [facility]  ______________________________________________________________________    Interval History   No issues overnight. Ready to go home today. Pain not out of control.    Data reviewed today: I reviewed all medications, new labs and imaging results over the last 24 hours.     Physical Exam   /61 (BP Location: Right arm)   Pulse 78   Temp 97.6  F (36.4  C) (Temporal)   Resp 16   Ht 1.6 m (5' 2.99\")   Wt 83 kg (183 lb)   SpO2 95%   BMI 32.43 kg/m    183 lbs 0 oz       General: sitting up in the chair wearing her street clothes in a back brace    HEENT: No scleral icterus. Oropharynx moist.     Neck: Supple. Normal range of motion.     Pulmonary: Normal work of breathing. Clear to auscultation bilaterally.    Cardiovascular: Regular rate and rhythm without murmur or extra heart sounds.    Abdomen: Soft and non-tender.    Extremities: No peripheral edema. No clubbing or cyanosis.     Neurologic: Awake, alert, appropriate.    Skin: Warm and dry.    Psychiatric: Normal affect and mood.     Data    Recent Labs   Lab 03/13/21  0626 03/12/21  0739   HGB 9.5* 9.5*   NA  --  139   POTASSIUM  --  4.3   CHLORIDE  --  " 105   CO2  --  31   BUN  --  10   CR  --  0.56   ANIONGAP  --  3   CHARLES  --  8.0*   GLC  --  132*     No results found for this or any previous visit (from the past 24 hour(s)).    Medications      Current Facility-Administered Medications   Medication Dose Route Frequency     acetaminophen  975 mg Oral Q8H     citalopram  20 mg Oral Daily     famotidine  20 mg Oral BID    Or     famotidine  20 mg Intravenous BID     gabapentin  300 mg Oral TID     hydrOXYzine  25 mg Oral Q6H     levETIRAcetam  1,000 mg Oral QPM     levETIRAcetam  500 mg Oral QAM     methocarbamol  500 mg Oral 4x Daily     mometasone  1-2 puff Inhalation QAM     oxyCODONE  20 mg Oral Q12H     senna-docusate  1 tablet Oral BID    Or     senna-docusate  2 tablet Oral BID     sodium chloride (PF)  3 mL Intracatheter Q8H

## 2021-03-13 NOTE — PLAN OF CARE
Patient vital signs are at baseline: Yes  Patient able to ambulate as they were prior to admission or with assist devices provided by therapies during their stay:  Yes, Up SBAx1,Brace/gbelt and walker.   Patient MUST void prior to discharge:  Yes  Patient able to tolerate oral intake:  Yes  Pain has adequate pain control using Oral analgesics:  Yes   CMS: LLE weakness baseline. Dressing saturated,  changed x2. Voiding good amounts. Possible discharge to home today.

## 2021-03-13 NOTE — PLAN OF CARE
Occupational Therapy Discharge Summary    Reason for therapy discharge:    All goals and outcomes met, no further needs identified.    Progress towards therapy goal(s). See goals on Care Plan in [facility] electronic health record for goal details.  Goals met    Therapy recommendation(s):    No further therapy is recommended.

## 2021-03-13 NOTE — PROGRESS NOTES
Vitals stable.  Hgb 9.5.   Less drainage from the wound.  Left quad strength better.  Stressed to use walker when ambulating.  Home today on oxycontin 20mg bid and percocet 10mg prn.

## 2021-03-13 NOTE — PROGRESS NOTES
[id number] A&Ox3 but seems confused about what is ok for her to be doing. Found pt to have removed surgical dressing herself stating that it was bleeding so she took it off. Educated on calling for help for infection control purposes and alerting staff when needing help, she seemed to get agitated with that. Also reminding to call for help. PRN oxy given for pain management which is mostly effective. Voiding in bathroom. CMS intact ex has baseline LLE weakness. Discharge home tomorrow.

## (undated) DEVICE — SOL NACL 0.9% 20ML VIAL

## (undated) DEVICE — SUCTION MANIFOLD NEPTUNE 2 SYS 4 PORT 0702-020-000

## (undated) DEVICE — DRAPE IOBAN INCISE 23X17" 6650EZ

## (undated) DEVICE — GLOVE PROTEXIS POWDER FREE 8.0 ORTHOPEDIC 2D73ET80

## (undated) DEVICE — DECANTER BAG 2002S

## (undated) DEVICE — BAG CLEAR TRASH 1.3M 39X33" P4040C

## (undated) DEVICE — LINEN DRAPE 54X72" 5467

## (undated) DEVICE — TUBING SUCTION 12"X1/4" N612

## (undated) DEVICE — GOWN IMPERVIOUS SPECIALTY XLG/XLONG 32474

## (undated) DEVICE — PACK SET-UP STD 9102

## (undated) DEVICE — SPONGE SURGIFOAM 01GM POWDER 1978

## (undated) DEVICE — MIDAS REX DISSECTING TOOL  MC30

## (undated) DEVICE — DRAPE COVER C-ARM SEAMLESS SNAP-KAP 03-KP26 LATEX FREE

## (undated) DEVICE — SUCTION FRAZIER 12FR W/HANDLE K73

## (undated) DEVICE — SU VICRYL 2-0 CT-1 27" UND J259H

## (undated) DEVICE — ESU HOLSTER PLASTIC DISP E2400

## (undated) DEVICE — DRAPE C-ARM 60X42" 1013

## (undated) DEVICE — NDL SPINAL 18GA 3.5" 405184

## (undated) DEVICE — TOOL DISSECT MIDAS MR8 14CM MATCH HEAD 3MM MR8-14MH30

## (undated) DEVICE — LINEN POUCH DBL 5427

## (undated) DEVICE — SU VICRYL 1 MO-4 27" J437H

## (undated) DEVICE — PACK SMALL SPINE RIDGES

## (undated) DEVICE — BLADE KNIFE SURG 11 371111

## (undated) DEVICE — DRSG ABDOMINAL 07 1/2X8" 7197D

## (undated) DEVICE — SPONGE SURGIFOAM 100 1974

## (undated) DEVICE — NDL BLUNT 18GA 1" W/O FILTER 305181

## (undated) DEVICE — GLOVE PROTEXIS BLUE W/NEU-THERA 6.5  2D73EB65

## (undated) DEVICE — GLOVE PROTEXIS W/NEU-THERA 6.5  2D73TE65

## (undated) DEVICE — MIDAS REX DISSECTING TOOL  14MH30

## (undated) DEVICE — STPL SKIN 35W 6.9MM  PXW35

## (undated) DEVICE — DRSG STERI STRIP 1/2X4" R1547

## (undated) DEVICE — GLOVE RADIATION RESISTANT SZ 8  95-396

## (undated) DEVICE — SU VICRYL 2-0 CT-2 CR 8X18" J726D

## (undated) DEVICE — ESU GROUND PAD ADULT W/CORD E7507

## (undated) DEVICE — CATH TRAY FOLEY SURESTEP 16FR W/URNE MTR STLK LATEX A303316A

## (undated) DEVICE — DOCENT DISPOSABLE NEURO MONITORING PROBE

## (undated) DEVICE — SPONGE RAY-TEC 4X8" 7318

## (undated) DEVICE — CATH IV ANGIO INTRO 12GA 382277

## (undated) DEVICE — SPONGE COTTONOID 1/2X1/2" 80-1400

## (undated) DEVICE — SU VICRYL 0 CT-1 CR 8X18" J740D

## (undated) DEVICE — Device

## (undated) DEVICE — LINEN ORTHO ACL PACK 5447

## (undated) DEVICE — SPONGE KITTNER 30-101

## (undated) DEVICE — SPONGE COTTONOID 1/2X3" 80-1407

## (undated) DEVICE — GLOVE PROTEXIS BLUE W/NEU-THERA 8.0  2D73EB80

## (undated) DEVICE — DRSG ADAPTIC 3X8" 6113

## (undated) DEVICE — DRSG AQUACEL AG 3.5X13.75" HYDROFIBER 412012

## (undated) DEVICE — DRSG AQUACEL AG 3.5X6.0" HYDROFIBER 412010

## (undated) DEVICE — DECANTER VIAL 2006S

## (undated) DEVICE — CATH TRAY FOLEY SURESTEP 16FR W/URINE MTR STATLK LF A303416A

## (undated) DEVICE — DRSG GAUZE 4X4" TRAY

## (undated) DEVICE — TUBING ACP CQ FOR JACKSON TABLE SPINE 5996-35

## (undated) DEVICE — DISPOSABLE MONOPOLAR PROBE

## (undated) DEVICE — ESU CLEANER TIP 31142717

## (undated) DEVICE — POSITIONER PT PRONESAFE HEAD REST W/DERMAPROX INSERT 40599

## (undated) DEVICE — DRAPE IOBAN ISOLATION VERTICAL 6619

## (undated) DEVICE — STPL SKIN 35W APPOSE 8886803712

## (undated) DEVICE — SYR 10ML LL W/O NDL 302995

## (undated) DEVICE — DISC CUTTER BLADE

## (undated) DEVICE — RX SURGIFLO HEMOSTATIC MATRIX 8ML 2991

## (undated) DEVICE — ESU PENCIL W/HOLSTER E2350H

## (undated) DEVICE — DRAPE SLEEVE 599

## (undated) DEVICE — SOL WATER IRRIG 1000ML BOTTLE 2F7114

## (undated) DEVICE — K WIRES

## (undated) DEVICE — SU VICRYL 1 MO-4 18" J702D

## (undated) DEVICE — SYR 03ML LL W/O NDL 309657

## (undated) DEVICE — DRSG TEGADERM 4X4 3/4" 1626W

## (undated) DEVICE — BLADE BONE MILL STRK 5.0MM MED 5400-701-000

## (undated) DEVICE — SYR 10ML LL W/O NDL

## (undated) DEVICE — SOL NACL 0.9% 20ML VIAL 4888-20

## (undated) DEVICE — TUBING SMOKE EVAC 3/8"X10' E3645

## (undated) DEVICE — TOOL DISSECT MIDAS MR8 14CM MATCH HD SYM-TRI MR8-14MH30T

## (undated) DEVICE — DRAPE MAYO STAND 23X54 8337

## (undated) DEVICE — MINI SQUAIR

## (undated) DEVICE — SU VICRYL 2-0 CT-1 36" J345H

## (undated) DEVICE — PREP DURAPREP 26ML APL 8630

## (undated) DEVICE — DRAIN HEMOVAC RESERVOIR KIT 10FR 1/8" MED 00-2550-002-10

## (undated) DEVICE — CUSHION INSERT LG PRONE VIEW JACKSON TABLE

## (undated) DEVICE — K-WIRES, AMW SPINE

## (undated) DEVICE — SU VICRYL 0 CT-1 27" J340H

## (undated) DEVICE — IOM 15 MIN UP TO 7 HOURS

## (undated) DEVICE — NEUROPROBE

## (undated) RX ORDER — CEFAZOLIN SODIUM 1 G/3ML
INJECTION, POWDER, FOR SOLUTION INTRAMUSCULAR; INTRAVENOUS
Status: DISPENSED
Start: 2019-01-17

## (undated) RX ORDER — NEOSTIGMINE METHYLSULFATE 1 MG/ML
VIAL (ML) INJECTION
Status: DISPENSED
Start: 2019-01-17

## (undated) RX ORDER — FENTANYL CITRATE 50 UG/ML
INJECTION, SOLUTION INTRAMUSCULAR; INTRAVENOUS
Status: DISPENSED
Start: 2021-03-11

## (undated) RX ORDER — DEXAMETHASONE SODIUM PHOSPHATE 4 MG/ML
INJECTION, SOLUTION INTRA-ARTICULAR; INTRALESIONAL; INTRAMUSCULAR; INTRAVENOUS; SOFT TISSUE
Status: DISPENSED
Start: 2021-03-11

## (undated) RX ORDER — PROPOFOL 10 MG/ML
INJECTION, EMULSION INTRAVENOUS
Status: DISPENSED
Start: 2020-07-30

## (undated) RX ORDER — FENTANYL CITRATE-0.9 % NACL/PF 10 MCG/ML
PLASTIC BAG, INJECTION (ML) INTRAVENOUS
Status: DISPENSED
Start: 2021-03-11

## (undated) RX ORDER — CEFAZOLIN SODIUM 2 G/100ML
INJECTION, SOLUTION INTRAVENOUS
Status: DISPENSED
Start: 2021-03-11

## (undated) RX ORDER — VANCOMYCIN HYDROCHLORIDE 1 G/20ML
INJECTION, POWDER, LYOPHILIZED, FOR SOLUTION INTRAVENOUS
Status: DISPENSED
Start: 2020-07-30

## (undated) RX ORDER — CEFAZOLIN SODIUM 1 G/3ML
INJECTION, POWDER, FOR SOLUTION INTRAMUSCULAR; INTRAVENOUS
Status: DISPENSED
Start: 2019-09-12

## (undated) RX ORDER — FENTANYL CITRATE 50 UG/ML
INJECTION, SOLUTION INTRAMUSCULAR; INTRAVENOUS
Status: DISPENSED
Start: 2020-07-30

## (undated) RX ORDER — CEFAZOLIN SODIUM 1 G/3ML
INJECTION, POWDER, FOR SOLUTION INTRAMUSCULAR; INTRAVENOUS
Status: DISPENSED
Start: 2020-07-30

## (undated) RX ORDER — PROPOFOL 10 MG/ML
INJECTION, EMULSION INTRAVENOUS
Status: DISPENSED
Start: 2021-03-11

## (undated) RX ORDER — FENTANYL CITRATE 50 UG/ML
INJECTION, SOLUTION INTRAMUSCULAR; INTRAVENOUS
Status: DISPENSED
Start: 2019-01-17

## (undated) RX ORDER — DEXAMETHASONE SODIUM PHOSPHATE 4 MG/ML
INJECTION, SOLUTION INTRA-ARTICULAR; INTRALESIONAL; INTRAMUSCULAR; INTRAVENOUS; SOFT TISSUE
Status: DISPENSED
Start: 2019-01-17

## (undated) RX ORDER — ONDANSETRON 2 MG/ML
INJECTION INTRAMUSCULAR; INTRAVENOUS
Status: DISPENSED
Start: 2020-07-30

## (undated) RX ORDER — VANCOMYCIN HYDROCHLORIDE 1 G/20ML
INJECTION, POWDER, LYOPHILIZED, FOR SOLUTION INTRAVENOUS
Status: DISPENSED
Start: 2019-09-12

## (undated) RX ORDER — HYDROMORPHONE HYDROCHLORIDE 1 MG/ML
INJECTION, SOLUTION INTRAMUSCULAR; INTRAVENOUS; SUBCUTANEOUS
Status: DISPENSED
Start: 2021-03-11

## (undated) RX ORDER — PROPOFOL 10 MG/ML
INJECTION, EMULSION INTRAVENOUS
Status: DISPENSED
Start: 2019-01-17

## (undated) RX ORDER — FENTANYL CITRATE-0.9 % NACL/PF 10 MCG/ML
PLASTIC BAG, INJECTION (ML) INTRAVENOUS
Status: DISPENSED
Start: 2020-07-30

## (undated) RX ORDER — DEXAMETHASONE SODIUM PHOSPHATE 4 MG/ML
INJECTION, SOLUTION INTRA-ARTICULAR; INTRALESIONAL; INTRAMUSCULAR; INTRAVENOUS; SOFT TISSUE
Status: DISPENSED
Start: 2019-09-12

## (undated) RX ORDER — BUPIVACAINE HYDROCHLORIDE 2.5 MG/ML
INJECTION, SOLUTION EPIDURAL; INFILTRATION; INTRACAUDAL
Status: DISPENSED
Start: 2019-01-17

## (undated) RX ORDER — LIDOCAINE HYDROCHLORIDE 10 MG/ML
INJECTION, SOLUTION EPIDURAL; INFILTRATION; INTRACAUDAL; PERINEURAL
Status: DISPENSED
Start: 2021-03-11

## (undated) RX ORDER — KETOROLAC TROMETHAMINE 30 MG/ML
INJECTION, SOLUTION INTRAMUSCULAR; INTRAVENOUS
Status: DISPENSED
Start: 2020-07-30

## (undated) RX ORDER — BUPIVACAINE HYDROCHLORIDE 2.5 MG/ML
INJECTION, SOLUTION EPIDURAL; INFILTRATION; INTRACAUDAL
Status: DISPENSED
Start: 2021-03-11

## (undated) RX ORDER — NEOSTIGMINE METHYLSULFATE 1 MG/ML
VIAL (ML) INJECTION
Status: DISPENSED
Start: 2019-09-12

## (undated) RX ORDER — ONDANSETRON 2 MG/ML
INJECTION INTRAMUSCULAR; INTRAVENOUS
Status: DISPENSED
Start: 2019-01-17

## (undated) RX ORDER — ONDANSETRON 2 MG/ML
INJECTION INTRAMUSCULAR; INTRAVENOUS
Status: DISPENSED
Start: 2021-03-11

## (undated) RX ORDER — HYDROMORPHONE HYDROCHLORIDE 1 MG/ML
INJECTION, SOLUTION INTRAMUSCULAR; INTRAVENOUS; SUBCUTANEOUS
Status: DISPENSED
Start: 2019-09-12

## (undated) RX ORDER — KETAMINE HCL IN 0.9 % NACL 50 MG/5 ML
SYRINGE (ML) INTRAVENOUS
Status: DISPENSED
Start: 2019-01-17

## (undated) RX ORDER — LIDOCAINE HYDROCHLORIDE 10 MG/ML
INJECTION, SOLUTION EPIDURAL; INFILTRATION; INTRACAUDAL; PERINEURAL
Status: DISPENSED
Start: 2020-07-30

## (undated) RX ORDER — ONDANSETRON 2 MG/ML
INJECTION INTRAMUSCULAR; INTRAVENOUS
Status: DISPENSED
Start: 2019-09-12

## (undated) RX ORDER — NEOSTIGMINE METHYLSULFATE 1 MG/ML
VIAL (ML) INJECTION
Status: DISPENSED
Start: 2021-03-11

## (undated) RX ORDER — CLINDAMYCIN PHOSPHATE 900 MG/50ML
INJECTION, SOLUTION INTRAVENOUS
Status: DISPENSED
Start: 2021-03-11

## (undated) RX ORDER — VASOPRESSIN 20 U/ML
INJECTION PARENTERAL
Status: DISPENSED
Start: 2020-07-30

## (undated) RX ORDER — VANCOMYCIN HYDROCHLORIDE 1 G/20ML
INJECTION, POWDER, LYOPHILIZED, FOR SOLUTION INTRAVENOUS
Status: DISPENSED
Start: 2019-01-17

## (undated) RX ORDER — CEFAZOLIN SODIUM 2 G/100ML
INJECTION, SOLUTION INTRAVENOUS
Status: DISPENSED
Start: 2019-01-17

## (undated) RX ORDER — PHENYLEPHRINE HYDROCHLORIDE 10 MG/ML
INJECTION INTRAVENOUS
Status: DISPENSED
Start: 2019-09-12

## (undated) RX ORDER — GABAPENTIN 300 MG/1
CAPSULE ORAL
Status: DISPENSED
Start: 2021-03-11

## (undated) RX ORDER — GLYCOPYRROLATE 0.2 MG/ML
INJECTION INTRAMUSCULAR; INTRAVENOUS
Status: DISPENSED
Start: 2020-07-30

## (undated) RX ORDER — DEXAMETHASONE SODIUM PHOSPHATE 4 MG/ML
INJECTION, SOLUTION INTRA-ARTICULAR; INTRALESIONAL; INTRAMUSCULAR; INTRAVENOUS; SOFT TISSUE
Status: DISPENSED
Start: 2020-07-30

## (undated) RX ORDER — TRANEXAMIC ACID 10 MG/ML
INJECTION, SOLUTION INTRAVENOUS
Status: DISPENSED
Start: 2021-03-11

## (undated) RX ORDER — GLYCOPYRROLATE 0.2 MG/ML
INJECTION INTRAMUSCULAR; INTRAVENOUS
Status: DISPENSED
Start: 2021-03-11

## (undated) RX ORDER — PROPOFOL 10 MG/ML
INJECTION, EMULSION INTRAVENOUS
Status: DISPENSED
Start: 2019-09-12

## (undated) RX ORDER — EPHEDRINE SULFATE 50 MG/ML
INJECTION, SOLUTION INTRAMUSCULAR; INTRAVENOUS; SUBCUTANEOUS
Status: DISPENSED
Start: 2019-01-17

## (undated) RX ORDER — FENTANYL CITRATE 50 UG/ML
INJECTION, SOLUTION INTRAMUSCULAR; INTRAVENOUS
Status: DISPENSED
Start: 2019-09-12

## (undated) RX ORDER — HYDROMORPHONE HYDROCHLORIDE 1 MG/ML
INJECTION, SOLUTION INTRAMUSCULAR; INTRAVENOUS; SUBCUTANEOUS
Status: DISPENSED
Start: 2019-01-17

## (undated) RX ORDER — ACETAMINOPHEN 325 MG/1
TABLET ORAL
Status: DISPENSED
Start: 2021-03-11

## (undated) RX ORDER — VANCOMYCIN HYDROCHLORIDE 1 G/20ML
INJECTION, POWDER, LYOPHILIZED, FOR SOLUTION INTRAVENOUS
Status: DISPENSED
Start: 2021-03-11

## (undated) RX ORDER — EPHEDRINE SULFATE 50 MG/ML
INJECTION, SOLUTION INTRAMUSCULAR; INTRAVENOUS; SUBCUTANEOUS
Status: DISPENSED
Start: 2019-09-12

## (undated) RX ORDER — CALCIUM CHLORIDE 100 MG/ML
INJECTION INTRAVENOUS; INTRAVENTRICULAR
Status: DISPENSED
Start: 2020-07-30

## (undated) RX ORDER — GLYCOPYRROLATE 0.2 MG/ML
INJECTION INTRAMUSCULAR; INTRAVENOUS
Status: DISPENSED
Start: 2019-09-12

## (undated) RX ORDER — LIDOCAINE HYDROCHLORIDE 10 MG/ML
INJECTION, SOLUTION EPIDURAL; INFILTRATION; INTRACAUDAL; PERINEURAL
Status: DISPENSED
Start: 2019-01-17

## (undated) RX ORDER — TRANEXAMIC ACID 10 MG/ML
INJECTION, SOLUTION INTRAVENOUS
Status: DISPENSED
Start: 2020-07-30

## (undated) RX ORDER — CEFAZOLIN SODIUM 2 G/100ML
INJECTION, SOLUTION INTRAVENOUS
Status: DISPENSED
Start: 2020-07-30

## (undated) RX ORDER — GLYCOPYRROLATE 0.2 MG/ML
INJECTION INTRAMUSCULAR; INTRAVENOUS
Status: DISPENSED
Start: 2019-01-17

## (undated) RX ORDER — KETAMINE HCL IN 0.9 % NACL 50 MG/5 ML
SYRINGE (ML) INTRAVENOUS
Status: DISPENSED
Start: 2019-09-12

## (undated) RX ORDER — OXYCODONE HYDROCHLORIDE 5 MG/1
TABLET ORAL
Status: DISPENSED
Start: 2021-03-11

## (undated) RX ORDER — BUPIVACAINE HYDROCHLORIDE AND EPINEPHRINE 2.5; 5 MG/ML; UG/ML
INJECTION, SOLUTION EPIDURAL; INFILTRATION; INTRACAUDAL; PERINEURAL
Status: DISPENSED
Start: 2020-07-30

## (undated) RX ORDER — VASOPRESSIN 20 U/ML
INJECTION PARENTERAL
Status: DISPENSED
Start: 2019-09-12

## (undated) RX ORDER — CEFAZOLIN SODIUM 2 G/100ML
INJECTION, SOLUTION INTRAVENOUS
Status: DISPENSED
Start: 2019-09-12

## (undated) RX ORDER — BUPIVACAINE HYDROCHLORIDE AND EPINEPHRINE 2.5; 5 MG/ML; UG/ML
INJECTION, SOLUTION EPIDURAL; INFILTRATION; INTRACAUDAL; PERINEURAL
Status: DISPENSED
Start: 2019-09-12